# Patient Record
Sex: FEMALE | Race: WHITE | Employment: FULL TIME | URBAN - METROPOLITAN AREA
[De-identification: names, ages, dates, MRNs, and addresses within clinical notes are randomized per-mention and may not be internally consistent; named-entity substitution may affect disease eponyms.]

---

## 2017-01-22 ENCOUNTER — APPOINTMENT (OUTPATIENT)
Dept: CT IMAGING | Facility: HOSPITAL | Age: 32
End: 2017-01-22
Attending: EMERGENCY MEDICINE
Payer: COMMERCIAL

## 2017-01-22 ENCOUNTER — HOSPITAL ENCOUNTER (EMERGENCY)
Facility: HOSPITAL | Age: 32
Discharge: HOME | End: 2017-01-22
Attending: EMERGENCY MEDICINE
Payer: COMMERCIAL

## 2017-01-22 VITALS
HEIGHT: 68 IN | WEIGHT: 283 LBS | BODY MASS INDEX: 42.89 KG/M2 | SYSTOLIC BLOOD PRESSURE: 126 MMHG | HEART RATE: 80 BPM | OXYGEN SATURATION: 97 % | RESPIRATION RATE: 22 BRPM | TEMPERATURE: 98 F | DIASTOLIC BLOOD PRESSURE: 83 MMHG

## 2017-01-22 DIAGNOSIS — R10.9 ABDOMINAL PAIN OF UNKNOWN ETIOLOGY: ICD-10-CM

## 2017-01-22 DIAGNOSIS — N12 PYELONEPHRITIS: Primary | ICD-10-CM

## 2017-01-22 LAB
ALBUMIN SERPL-MCNC: 4.5 G/DL (ref 3.5–4.8)
ALP LIVER SERPL-CCNC: 41 U/L (ref 37–98)
ALT SERPL-CCNC: 34 U/L (ref 14–54)
AST SERPL-CCNC: 40 U/L (ref 15–41)
BASOPHILS # BLD AUTO: 0.08 X10(3) UL (ref 0–0.1)
BASOPHILS NFR BLD AUTO: 1 %
BILIRUB SERPL-MCNC: 0.5 MG/DL (ref 0.1–2)
BILIRUB UR QL STRIP.AUTO: NEGATIVE
BUN BLD-MCNC: 17 MG/DL (ref 8–20)
CALCIUM BLD-MCNC: 9 MG/DL (ref 8.3–10.3)
CHLORIDE: 103 MMOL/L (ref 101–111)
CO2: 27 MMOL/L (ref 22–32)
COLOR UR AUTO: YELLOW
CREAT BLD-MCNC: 1.1 MG/DL (ref 0.55–1.02)
EOSINOPHIL # BLD AUTO: 0.2 X10(3) UL (ref 0–0.3)
EOSINOPHIL NFR BLD AUTO: 2.4 %
ERYTHROCYTE [DISTWIDTH] IN BLOOD BY AUTOMATED COUNT: 17.5 % (ref 11.5–16)
GLUCOSE BLD-MCNC: 93 MG/DL (ref 70–99)
GLUCOSE UR STRIP.AUTO-MCNC: NEGATIVE MG/DL
HCT VFR BLD AUTO: 37.4 % (ref 34–50)
HGB BLD-MCNC: 12.2 G/DL (ref 12–16)
IMMATURE GRANULOCYTE COUNT: 0.05 X10(3) UL (ref 0–1)
IMMATURE GRANULOCYTE RATIO %: 0.6 %
KETONES UR STRIP.AUTO-MCNC: NEGATIVE MG/DL
LIPASE: 285 U/L (ref 73–393)
LYMPHOCYTES # BLD AUTO: 2.11 X10(3) UL (ref 0.9–4)
LYMPHOCYTES NFR BLD AUTO: 25.1 %
M PROTEIN MFR SERPL ELPH: 8.5 G/DL (ref 6.1–8.3)
MCH RBC QN AUTO: 26.7 PG (ref 27–33.2)
MCHC RBC AUTO-ENTMCNC: 32.6 G/DL (ref 31–37)
MCV RBC AUTO: 81.8 FL (ref 81–100)
MONOCYTES # BLD AUTO: 0.53 X10(3) UL (ref 0.1–0.6)
MONOCYTES NFR BLD AUTO: 6.3 %
NEUTROPHIL ABS PRELIM: 5.43 X10 (3) UL (ref 1.3–6.7)
NEUTROPHILS # BLD AUTO: 5.43 X10(3) UL (ref 1.3–6.7)
NEUTROPHILS NFR BLD AUTO: 64.6 %
NITRITE UR QL STRIP.AUTO: NEGATIVE
PH UR STRIP.AUTO: 6 [PH] (ref 4.5–8)
PLATELET # BLD AUTO: 349 10(3)UL (ref 150–450)
POCT LOT NUMBER: NORMAL
POCT URINE PREGNANCY: NEGATIVE
POTASSIUM SERPL-SCNC: 4.1 MMOL/L (ref 3.6–5.1)
PROT UR STRIP.AUTO-MCNC: NEGATIVE MG/DL
RBC # BLD AUTO: 4.57 X10(6)UL (ref 3.8–5.1)
RBC #/AREA URNS AUTO: >10 /HPF
RED CELL DISTRIBUTION WIDTH-SD: 51.1 FL (ref 35.1–46.3)
SODIUM SERPL-SCNC: 136 MMOL/L (ref 136–144)
SP GR UR STRIP.AUTO: 1.01 (ref 1–1.03)
TROPONIN: <0.046 NG/ML (ref ?–0.05)
UROBILINOGEN UR STRIP.AUTO-MCNC: <2 MG/DL
WBC # BLD AUTO: 8.4 X10(3) UL (ref 4–13)

## 2017-01-22 PROCEDURE — 84484 ASSAY OF TROPONIN QUANT: CPT | Performed by: EMERGENCY MEDICINE

## 2017-01-22 PROCEDURE — 83690 ASSAY OF LIPASE: CPT | Performed by: EMERGENCY MEDICINE

## 2017-01-22 PROCEDURE — 87086 URINE CULTURE/COLONY COUNT: CPT | Performed by: EMERGENCY MEDICINE

## 2017-01-22 PROCEDURE — 93005 ELECTROCARDIOGRAM TRACING: CPT

## 2017-01-22 PROCEDURE — 71275 CT ANGIOGRAPHY CHEST: CPT

## 2017-01-22 PROCEDURE — 80053 COMPREHEN METABOLIC PANEL: CPT | Performed by: EMERGENCY MEDICINE

## 2017-01-22 PROCEDURE — 99285 EMERGENCY DEPT VISIT HI MDM: CPT

## 2017-01-22 PROCEDURE — 96375 TX/PRO/DX INJ NEW DRUG ADDON: CPT

## 2017-01-22 PROCEDURE — 93010 ELECTROCARDIOGRAM REPORT: CPT

## 2017-01-22 PROCEDURE — 96365 THER/PROPH/DIAG IV INF INIT: CPT

## 2017-01-22 PROCEDURE — 81001 URINALYSIS AUTO W/SCOPE: CPT | Performed by: EMERGENCY MEDICINE

## 2017-01-22 PROCEDURE — 81025 URINE PREGNANCY TEST: CPT

## 2017-01-22 PROCEDURE — 74177 CT ABD & PELVIS W/CONTRAST: CPT

## 2017-01-22 PROCEDURE — 85025 COMPLETE CBC W/AUTO DIFF WBC: CPT | Performed by: EMERGENCY MEDICINE

## 2017-01-22 RX ORDER — ONDANSETRON 2 MG/ML
4 INJECTION INTRAMUSCULAR; INTRAVENOUS ONCE
Status: COMPLETED | OUTPATIENT
Start: 2017-01-22 | End: 2017-01-22

## 2017-01-22 RX ORDER — CEPHALEXIN 500 MG/1
500 CAPSULE ORAL 4 TIMES DAILY
Qty: 40 CAPSULE | Refills: 0 | Status: SHIPPED | OUTPATIENT
Start: 2017-01-22 | End: 2017-02-01

## 2017-01-22 RX ORDER — LISINOPRIL 10 MG/1
10 TABLET ORAL DAILY
COMMUNITY
End: 2020-07-07

## 2017-01-22 RX ORDER — IBUPROFEN 600 MG/1
600 TABLET ORAL ONCE
Status: COMPLETED | OUTPATIENT
Start: 2017-01-22 | End: 2017-01-22

## 2017-01-22 NOTE — ED PROVIDER NOTES
She feels comfortable going home. She would prefer to go home I discussed if she has any recurrent pain, fevers or chills to return here immediately.   Patient Seen in: [facility] Emergency Department    History   Patient presents with:  Abdomen/Fla hypothyroidism    • Mental disorder      hx of depression after ectopic pregnancy   • Depression    • Post partum depression      hx of after ectopic pregnancy, took meds for about 6 weeks   • Pregnancy-induced hypertension            Past Surgical History Pulse 01/22/17 1405 84   Resp 01/22/17 1405 18   Temp 01/22/17 1405 98 °F (36.7 °C)   Temp src 01/22/17 1405 Oral   SpO2 01/22/17 1405 98 %   O2 Device 01/22/17 1405 None (Room air)       Current:/83 mmHg  Pulse 80  Temp(Src) 98 °F (36.7 °C) (Oral) (*)     All other components within normal limits   LIPASE - Normal   TROPONIN I - Normal   CBC WITH DIFFERENTIAL WITH PLATELET    Narrative: The following orders were created for panel order CBC WITH DIFFERENTIAL WITH PLATELET.   Procedure right lateral abdomen, right flank area and also tender slightly in the right lower quadrant she may have a urinary tract infection with radiation to the right flank suggesting from the urinary tract infection.   She may have pyelonephritis, the patient fee

## 2017-01-23 LAB
ATRIAL RATE: 80 BPM
P AXIS: 28 DEGREES
P-R INTERVAL: 200 MS
Q-T INTERVAL: 398 MS
QRS DURATION: 110 MS
QTC CALCULATION (BEZET): 459 MS
R AXIS: -2 DEGREES
T AXIS: 59 DEGREES
VENTRICULAR RATE: 80 BPM

## 2017-03-11 ENCOUNTER — APPOINTMENT (OUTPATIENT)
Dept: GENERAL RADIOLOGY | Age: 32
End: 2017-03-11
Attending: EMERGENCY MEDICINE
Payer: COMMERCIAL

## 2017-03-11 ENCOUNTER — HOSPITAL ENCOUNTER (EMERGENCY)
Age: 32
Discharge: HOME | End: 2017-03-11
Attending: EMERGENCY MEDICINE
Payer: COMMERCIAL

## 2017-03-11 ENCOUNTER — APPOINTMENT (OUTPATIENT)
Dept: ULTRASOUND IMAGING | Age: 32
End: 2017-03-11
Attending: EMERGENCY MEDICINE
Payer: COMMERCIAL

## 2017-03-11 VITALS
BODY MASS INDEX: 40.92 KG/M2 | RESPIRATION RATE: 18 BRPM | WEIGHT: 270 LBS | TEMPERATURE: 98 F | HEART RATE: 66 BPM | HEIGHT: 68 IN | DIASTOLIC BLOOD PRESSURE: 72 MMHG | OXYGEN SATURATION: 97 % | SYSTOLIC BLOOD PRESSURE: 128 MMHG

## 2017-03-11 DIAGNOSIS — R79.89 ELEVATED SERUM CREATININE: ICD-10-CM

## 2017-03-11 DIAGNOSIS — R80.9 PROTEINURIA, UNSPECIFIED TYPE: ICD-10-CM

## 2017-03-11 DIAGNOSIS — D64.9 ANEMIA, UNSPECIFIED TYPE: Primary | ICD-10-CM

## 2017-03-11 DIAGNOSIS — R31.9 HEMATURIA: ICD-10-CM

## 2017-03-11 LAB
ALBUMIN SERPL-MCNC: 3.5 G/DL (ref 3.5–4.8)
ALP LIVER SERPL-CCNC: 34 U/L (ref 37–98)
ALT SERPL-CCNC: 19 U/L (ref 14–54)
AST SERPL-CCNC: 16 U/L (ref 15–41)
BASOPHILS # BLD AUTO: 0.02 X10(3) UL (ref 0–0.1)
BASOPHILS NFR BLD AUTO: 0.4 %
BILIRUB SERPL-MCNC: 0.5 MG/DL (ref 0.1–2)
BILIRUB UR QL STRIP.AUTO: NEGATIVE
BUN BLD-MCNC: 10 MG/DL (ref 8–20)
CALCIUM BLD-MCNC: 7.7 MG/DL (ref 8.3–10.3)
CHLORIDE: 108 MMOL/L (ref 101–111)
CLARITY UR REFRACT.AUTO: CLEAR
CO2: 28 MMOL/L (ref 22–32)
COLOR UR AUTO: YELLOW
CREAT BLD-MCNC: 1.05 MG/DL (ref 0.55–1.02)
DEPRECATED HBV CORE AB SER IA-ACNC: 29.5 NG/ML (ref 10–291)
EOSINOPHIL # BLD AUTO: 0.15 X10(3) UL (ref 0–0.3)
EOSINOPHIL NFR BLD AUTO: 2.8 %
ERYTHROCYTE [DISTWIDTH] IN BLOOD BY AUTOMATED COUNT: 15.5 % (ref 11.5–16)
GLUCOSE BLD-MCNC: 103 MG/DL (ref 70–99)
GLUCOSE UR STRIP.AUTO-MCNC: NEGATIVE MG/DL
HCT VFR BLD AUTO: 32.9 % (ref 34–50)
HGB BLD-MCNC: 10.5 G/DL (ref 12–16)
IMMATURE GRANULOCYTE COUNT: 0.04 X10(3) UL (ref 0–1)
IMMATURE GRANULOCYTE RATIO %: 0.7 %
IRON: 27 UG/DL (ref 28–170)
KETONES UR STRIP.AUTO-MCNC: NEGATIVE MG/DL
LIPASE: 131 U/L (ref 73–393)
LYMPHOCYTES # BLD AUTO: 1.3 X10(3) UL (ref 0.9–4)
LYMPHOCYTES NFR BLD AUTO: 24 %
M PROTEIN MFR SERPL ELPH: 7.4 G/DL (ref 6.1–8.3)
MCH RBC QN AUTO: 26.9 PG (ref 27–33.2)
MCHC RBC AUTO-ENTMCNC: 31.9 G/DL (ref 31–37)
MCV RBC AUTO: 84.1 FL (ref 81–100)
MONOCYTES # BLD AUTO: 0.57 X10(3) UL (ref 0.1–0.6)
MONOCYTES NFR BLD AUTO: 10.5 %
NEUTROPHIL ABS PRELIM: 3.34 X10 (3) UL (ref 1.3–6.7)
NEUTROPHILS # BLD AUTO: 3.34 X10(3) UL (ref 1.3–6.7)
NEUTROPHILS NFR BLD AUTO: 61.6 %
NITRITE UR QL STRIP.AUTO: NEGATIVE
PH UR STRIP.AUTO: 6 [PH] (ref 4.5–8)
PLATELET # BLD AUTO: 268 10(3)UL (ref 150–450)
POCT LOT NUMBER: NORMAL
POCT URINE PREGNANCY: NEGATIVE
POTASSIUM SERPL-SCNC: 3.5 MMOL/L (ref 3.6–5.1)
RBC # BLD AUTO: 3.91 X10(6)UL (ref 3.8–5.1)
RED CELL DISTRIBUTION WIDTH-SD: 47.2 FL (ref 35.1–46.3)
SODIUM SERPL-SCNC: 142 MMOL/L (ref 136–144)
SP GR UR STRIP.AUTO: 1.01 (ref 1–1.03)
UROBILINOGEN UR STRIP.AUTO-MCNC: 0.2 MG/DL
WBC # BLD AUTO: 5.4 X10(3) UL (ref 4–13)

## 2017-03-11 PROCEDURE — 96365 THER/PROPH/DIAG IV INF INIT: CPT

## 2017-03-11 PROCEDURE — 80053 COMPREHEN METABOLIC PANEL: CPT | Performed by: EMERGENCY MEDICINE

## 2017-03-11 PROCEDURE — 74020 XR ABDOMEN, OBSTRUCTIVE SERIES (CPT=74020): CPT

## 2017-03-11 PROCEDURE — 83690 ASSAY OF LIPASE: CPT | Performed by: EMERGENCY MEDICINE

## 2017-03-11 PROCEDURE — 87086 URINE CULTURE/COLONY COUNT: CPT | Performed by: EMERGENCY MEDICINE

## 2017-03-11 PROCEDURE — 81025 URINE PREGNANCY TEST: CPT

## 2017-03-11 PROCEDURE — 83540 ASSAY OF IRON: CPT | Performed by: EMERGENCY MEDICINE

## 2017-03-11 PROCEDURE — 99284 EMERGENCY DEPT VISIT MOD MDM: CPT

## 2017-03-11 PROCEDURE — 76700 US EXAM ABDOM COMPLETE: CPT

## 2017-03-11 PROCEDURE — 81001 URINALYSIS AUTO W/SCOPE: CPT | Performed by: EMERGENCY MEDICINE

## 2017-03-11 PROCEDURE — 96361 HYDRATE IV INFUSION ADD-ON: CPT

## 2017-03-11 PROCEDURE — 82728 ASSAY OF FERRITIN: CPT | Performed by: EMERGENCY MEDICINE

## 2017-03-11 PROCEDURE — 85025 COMPLETE CBC W/AUTO DIFF WBC: CPT | Performed by: EMERGENCY MEDICINE

## 2017-03-11 RX ORDER — ALBUTEROL SULFATE 2.5 MG/3ML
2.5 SOLUTION RESPIRATORY (INHALATION) EVERY 4 HOURS PRN
Qty: 30 AMPULE | Refills: 0 | Status: SHIPPED | OUTPATIENT
Start: 2017-03-11 | End: 2017-03-11

## 2017-03-11 NOTE — ED PROVIDER NOTES
Patient Seen in: THE [facility] Emergency Department In [place]    History   Patient presents with:  Abdomen/Flank Pain (GI/)    Stated Complaint: upper abdominal pain     HPI    Patient was seen in January with pyelonephritis.   Now she has had a few days o Alcohol Use: No                 Comment: once a month      Review of Systems    Positive for stated complaint: upper abdominal pain   Other systems are as noted in HPI. Constitutional and vital signs reviewed.       All other systems reviewed and negative following:     Glucose 103 (*)     Creatinine 1.05 (*)     Calcium, Total 7.7 (*)     Alkaline Phosphatase 34 (*)     Potassium 3.5 (*)     All other components within normal limits   CBC W/ DIFFERENTIAL - Abnormal; Notable for the following:     HGB 10.5 Impression:  Anemia, unspecified type  (primary encounter diagnosis)  Elevated serum creatinine  Hematuria  Proteinuria, unspecified type    Disposition:  Discharge    Follow-up:   Markos Villarreal [address] [place], [place] [id number]

## 2017-03-11 NOTE — ED INITIAL ASSESSMENT (HPI)
Was seen by another ED X2  for nausea/vomiting and flank pain. Currently on antibiotic-Macrobid. States pain is still there-smitha flank pain and both upper abd area. Still nauseous. taking zofran.

## 2017-03-13 ENCOUNTER — TELEPHONE (OUTPATIENT)
Dept: NEPHROLOGY | Facility: CLINIC | Age: 32
End: 2017-03-13

## 2017-03-22 ENCOUNTER — APPOINTMENT (OUTPATIENT)
Dept: LAB | Age: 32
End: 2017-03-22
Attending: INTERNAL MEDICINE
Payer: COMMERCIAL

## 2017-03-22 ENCOUNTER — OFFICE VISIT (OUTPATIENT)
Dept: NEPHROLOGY | Facility: CLINIC | Age: 32
End: 2017-03-22

## 2017-03-22 VITALS — SYSTOLIC BLOOD PRESSURE: 118 MMHG | WEIGHT: 278 LBS | BODY MASS INDEX: 42 KG/M2 | DIASTOLIC BLOOD PRESSURE: 80 MMHG

## 2017-03-22 DIAGNOSIS — N10 ACUTE PYELONEPHRITIS: Primary | ICD-10-CM

## 2017-03-22 DIAGNOSIS — N10 ACUTE PYELONEPHRITIS: ICD-10-CM

## 2017-03-22 LAB
BILIRUB UR QL STRIP.AUTO: NEGATIVE
CLARITY UR REFRACT.AUTO: CLEAR
COLOR UR AUTO: YELLOW
CREAT UR-SCNC: 157 MG/DL
GLUCOSE UR STRIP.AUTO-MCNC: NEGATIVE MG/DL
KETONES UR STRIP.AUTO-MCNC: NEGATIVE MG/DL
NITRITE UR QL STRIP.AUTO: NEGATIVE
PH UR STRIP.AUTO: 5 [PH] (ref 4.5–8)
PROT UR STRIP.AUTO-MCNC: NEGATIVE MG/DL
PROT UR-MCNC: 23.8 MG/DL
RBC #/AREA URNS AUTO: >10 /HPF
SP GR UR STRIP.AUTO: 1.02 (ref 1–1.03)
UROBILINOGEN UR STRIP.AUTO-MCNC: <2 MG/DL

## 2017-03-22 PROCEDURE — 84156 ASSAY OF PROTEIN URINE: CPT

## 2017-03-22 PROCEDURE — 82570 ASSAY OF URINE CREATININE: CPT

## 2017-03-22 PROCEDURE — 99243 OFF/OP CNSLTJ NEW/EST LOW 30: CPT | Performed by: INTERNAL MEDICINE

## 2017-03-22 PROCEDURE — 81001 URINALYSIS AUTO W/SCOPE: CPT

## 2017-03-22 NOTE — PROGRESS NOTES
Consult Note      REASON FOR CONSULT:  Recurrent UTI         HPI:   Marianela Krishnan is a 27year old female with Patient presents with:  Kidney Problem    PHYSICIAN DEE Espinoza was seen in the nephrology clinic today in consultation for man fever/chills  Denies wt loss/gain  Denies HA or visual changes  Denies CP or palpitations  Denies SOB/cough/hemoptysis  Denies abd or flank pain  Denies N/V/D  Denies change in urinary habits or gross hematuria  Denies LE edema  Denies skin rashes/myalgias MGGM            PHYSICAL EXAM:   /80 mmHg  Wt 278 lb   Wt Readings from Last 6 Encounters:  03/22/17 : 278 lb  03/11/17 : 270 lb  01/22/17 : 283 lb  07/17/16 : 280 lb  09/11/15 : 275 lb  02/16/15 : 270 lb 9.6 oz    General: Alert and oriented in no a Trace-lysed* 03/11/2017   PHURINE 6.0 03/11/2017   PROUR Trace* 03/11/2017   UROBILINOGEN 0.2 03/11/2017   NITRITE Negative 03/11/2017   LEUUR Trace* 03/11/2017   WBCUR 1-4 03/11/2017   RBCUR None Seen 03/11/2017   EPIUR Small 03/11/2017   BACUR None Seen

## 2018-10-02 ENCOUNTER — APPOINTMENT (OUTPATIENT)
Dept: CT IMAGING | Facility: HOSPITAL | Age: 33
End: 2018-10-02
Attending: EMERGENCY MEDICINE
Payer: COMMERCIAL

## 2018-10-02 ENCOUNTER — HOSPITAL ENCOUNTER (EMERGENCY)
Facility: HOSPITAL | Age: 33
Discharge: HOME | End: 2018-10-02
Attending: EMERGENCY MEDICINE
Payer: COMMERCIAL

## 2018-10-02 VITALS
HEIGHT: 69 IN | WEIGHT: 280 LBS | HEART RATE: 92 BPM | OXYGEN SATURATION: 98 % | BODY MASS INDEX: 41.47 KG/M2 | RESPIRATION RATE: 18 BRPM | DIASTOLIC BLOOD PRESSURE: 92 MMHG | SYSTOLIC BLOOD PRESSURE: 125 MMHG | TEMPERATURE: 98 F

## 2018-10-02 DIAGNOSIS — R11.11 NON-INTRACTABLE VOMITING WITHOUT NAUSEA, UNSPECIFIED VOMITING TYPE: ICD-10-CM

## 2018-10-02 DIAGNOSIS — R10.9 FLANK PAIN: Primary | ICD-10-CM

## 2018-10-02 PROCEDURE — 80053 COMPREHEN METABOLIC PANEL: CPT | Performed by: EMERGENCY MEDICINE

## 2018-10-02 PROCEDURE — 87086 URINE CULTURE/COLONY COUNT: CPT | Performed by: EMERGENCY MEDICINE

## 2018-10-02 PROCEDURE — 81001 URINALYSIS AUTO W/SCOPE: CPT | Performed by: EMERGENCY MEDICINE

## 2018-10-02 PROCEDURE — 99284 EMERGENCY DEPT VISIT MOD MDM: CPT

## 2018-10-02 PROCEDURE — 81025 URINE PREGNANCY TEST: CPT

## 2018-10-02 PROCEDURE — 74176 CT ABD & PELVIS W/O CONTRAST: CPT | Performed by: EMERGENCY MEDICINE

## 2018-10-02 PROCEDURE — 96361 HYDRATE IV INFUSION ADD-ON: CPT

## 2018-10-02 PROCEDURE — 83690 ASSAY OF LIPASE: CPT | Performed by: EMERGENCY MEDICINE

## 2018-10-02 PROCEDURE — 96375 TX/PRO/DX INJ NEW DRUG ADDON: CPT

## 2018-10-02 PROCEDURE — 96374 THER/PROPH/DIAG INJ IV PUSH: CPT

## 2018-10-02 PROCEDURE — 85025 COMPLETE CBC W/AUTO DIFF WBC: CPT | Performed by: EMERGENCY MEDICINE

## 2018-10-02 RX ORDER — ONDANSETRON 2 MG/ML
4 INJECTION INTRAMUSCULAR; INTRAVENOUS ONCE
Status: COMPLETED | OUTPATIENT
Start: 2018-10-02 | End: 2018-10-02

## 2018-10-02 RX ORDER — KETOROLAC TROMETHAMINE 30 MG/ML
30 INJECTION, SOLUTION INTRAMUSCULAR; INTRAVENOUS ONCE
Status: COMPLETED | OUTPATIENT
Start: 2018-10-02 | End: 2018-10-02

## 2018-10-02 RX ORDER — ONDANSETRON 4 MG/1
4 TABLET, ORALLY DISINTEGRATING ORAL EVERY 4 HOURS PRN
Qty: 10 TABLET | Refills: 0 | Status: SHIPPED | OUTPATIENT
Start: 2018-10-02 | End: 2018-10-09

## 2018-10-02 NOTE — ED PROVIDER NOTES
Patient Seen in: [facility] Emergency Department    History   Patient presents with:  Abdomen/Flank Pain (GI/)    Stated Complaint: back pain flank pain with nausea    HPI    Right upper and now left upper back pain and flank pain she states the l kg   SpO2 98%   BMI 41.35 kg/m²         Physical Exam   Constitutional: She is oriented to person, place, and time. She appears well-developed and well-nourished. No distress. HENT:   Head: Atraumatic.    Right Ear: External ear normal.   Left Ear: Extern A/G Ratio 0.8 (*)     All other components within normal limits   LIPASE - Normal   CBC WITH DIFFERENTIAL WITH PLATELET    Narrative: The following orders were created for panel order CBC WITH DIFFERENTIAL WITH PLATELET.   Procedure kidney is noted. ADRENALS:  No mass or enlargement. LIVER:  No enlargement, atrophy, abnormal density, or significant focal     lesion. BILIARY:  The gallbladder is decompressed.     PANCREAS:  No lesion, fluid collection, ductal dilatation, or gastroenteritis      Disposition and Plan     Clinical Impression:  Flank pain  (primary encounter diagnosis)  Non-intractable vomiting without nausea, unspecified vomiting type    Disposition:  Discharge  10/2/2018  6:09 pm    Follow-up:  Dawna Hilariokselene

## 2018-11-24 ENCOUNTER — HOSPITAL ENCOUNTER (EMERGENCY)
Age: 33
Discharge: HOME | End: 2018-11-24
Attending: EMERGENCY MEDICINE
Payer: COMMERCIAL

## 2018-11-24 VITALS
HEIGHT: 68 IN | WEIGHT: 275 LBS | TEMPERATURE: 98 F | BODY MASS INDEX: 41.68 KG/M2 | RESPIRATION RATE: 18 BRPM | HEART RATE: 81 BPM | DIASTOLIC BLOOD PRESSURE: 64 MMHG | OXYGEN SATURATION: 98 % | SYSTOLIC BLOOD PRESSURE: 119 MMHG

## 2018-11-24 DIAGNOSIS — K52.9 ACUTE GASTROENTERITIS: Primary | ICD-10-CM

## 2018-11-24 PROCEDURE — 81025 URINE PREGNANCY TEST: CPT

## 2018-11-24 PROCEDURE — 83690 ASSAY OF LIPASE: CPT | Performed by: EMERGENCY MEDICINE

## 2018-11-24 PROCEDURE — 96375 TX/PRO/DX INJ NEW DRUG ADDON: CPT

## 2018-11-24 PROCEDURE — 99284 EMERGENCY DEPT VISIT MOD MDM: CPT

## 2018-11-24 PROCEDURE — 81003 URINALYSIS AUTO W/O SCOPE: CPT | Performed by: EMERGENCY MEDICINE

## 2018-11-24 PROCEDURE — 96374 THER/PROPH/DIAG INJ IV PUSH: CPT

## 2018-11-24 PROCEDURE — 80053 COMPREHEN METABOLIC PANEL: CPT | Performed by: EMERGENCY MEDICINE

## 2018-11-24 PROCEDURE — 96361 HYDRATE IV INFUSION ADD-ON: CPT

## 2018-11-24 PROCEDURE — 85025 COMPLETE CBC W/AUTO DIFF WBC: CPT | Performed by: EMERGENCY MEDICINE

## 2018-11-24 RX ORDER — KETOROLAC TROMETHAMINE 30 MG/ML
15 INJECTION, SOLUTION INTRAMUSCULAR; INTRAVENOUS ONCE
Status: COMPLETED | OUTPATIENT
Start: 2018-11-24 | End: 2018-11-24

## 2018-11-24 RX ORDER — VENLAFAXINE HYDROCHLORIDE 75 MG/1
150 CAPSULE, EXTENDED RELEASE ORAL DAILY
COMMUNITY

## 2018-11-24 RX ORDER — ONDANSETRON 4 MG/1
4 TABLET, ORALLY DISINTEGRATING ORAL EVERY 4 HOURS PRN
Qty: 10 TABLET | Refills: 0 | Status: SHIPPED | OUTPATIENT
Start: 2018-11-24 | End: 2018-12-01

## 2018-11-24 RX ORDER — ONDANSETRON 2 MG/ML
4 INJECTION INTRAMUSCULAR; INTRAVENOUS ONCE
Status: COMPLETED | OUTPATIENT
Start: 2018-11-24 | End: 2018-11-24

## 2018-11-24 NOTE — ED INITIAL ASSESSMENT (HPI)
Pt reports left flank pain starting at 1200 today. Pt reports nausea/vomiting/diarrhea. Pt also c/o generalized abdominal pain.

## 2018-11-24 NOTE — ED PROVIDER NOTES
Patient Seen in: THE [facility] Emergency Department In [place]    History   Patient presents with:  Abdomen/Flank Pain (GI/)  Nausea/Vomiting/Diarrhea (gastrointestinal)  Back Pain (musculoskeletal)  Fever (infectious)    Stated Complaint: ABD/BACK PAIN NV SpO2 97 %   O2 Device None (Room air)       Current:/64   Pulse 81   Temp 98.2 °F (36.8 °C) (Oral)   Resp 18   Ht 172.7 cm (5' 8\")   Wt 124.7 kg   SpO2 98%   BMI 41.81 kg/m²         Physical Exam    Patient is alert and oriented x3 no acute distre ---------                               -----------         ------                     STOOL CULTURE(P)[[id number]]                                                            SHIGATOXIN[[id number]]

## 2020-01-01 ENCOUNTER — EXTERNAL RECORD (OUTPATIENT)
Dept: OTHER | Age: 35
End: 2020-01-01

## 2020-01-26 ENCOUNTER — HOSPITAL ENCOUNTER (EMERGENCY)
Age: 35
Discharge: HOME | End: 2020-01-26
Attending: EMERGENCY MEDICINE
Payer: COMMERCIAL

## 2020-01-26 VITALS
HEIGHT: 68.9 IN | OXYGEN SATURATION: 97 % | WEIGHT: 293 LBS | DIASTOLIC BLOOD PRESSURE: 78 MMHG | TEMPERATURE: 98 F | HEART RATE: 87 BPM | BODY MASS INDEX: 43.4 KG/M2 | RESPIRATION RATE: 16 BRPM | SYSTOLIC BLOOD PRESSURE: 150 MMHG

## 2020-01-26 DIAGNOSIS — R11.2 NAUSEA AND VOMITING IN ADULT: ICD-10-CM

## 2020-01-26 DIAGNOSIS — R19.7 DIARRHEA, UNSPECIFIED TYPE: ICD-10-CM

## 2020-01-26 DIAGNOSIS — R10.9 ABDOMINAL PAIN, ACUTE: Primary | ICD-10-CM

## 2020-01-26 LAB
ALBUMIN SERPL-MCNC: 3.6 G/DL (ref 3.4–5)
ALBUMIN/GLOB SERPL: 0.8 {RATIO} (ref 1–2)
ALP LIVER SERPL-CCNC: 41 U/L (ref 37–98)
ALT SERPL-CCNC: 68 U/L (ref 13–56)
ANION GAP SERPL CALC-SCNC: 8 MMOL/L (ref 0–18)
AST SERPL-CCNC: 57 U/L (ref 15–37)
BASOPHILS # BLD AUTO: 0.01 X10(3) UL (ref 0–0.2)
BASOPHILS NFR BLD AUTO: 0.1 %
BILIRUB SERPL-MCNC: 0.7 MG/DL (ref 0.1–2)
BILIRUB UR QL STRIP.AUTO: NEGATIVE
BUN BLD-MCNC: 12 MG/DL (ref 7–18)
BUN/CREAT SERPL: 12.4 (ref 10–20)
CALCIUM BLD-MCNC: 8.7 MG/DL (ref 8.5–10.1)
CHLORIDE SERPL-SCNC: 103 MMOL/L (ref 98–112)
CO2 SERPL-SCNC: 25 MMOL/L (ref 21–32)
COLOR UR AUTO: YELLOW
CREAT BLD-MCNC: 0.97 MG/DL (ref 0.55–1.02)
DEPRECATED RDW RBC AUTO: 43.9 FL (ref 35.1–46.3)
EOSINOPHIL # BLD AUTO: 0.19 X10(3) UL (ref 0–0.7)
EOSINOPHIL NFR BLD AUTO: 2.4 %
ERYTHROCYTE [DISTWIDTH] IN BLOOD BY AUTOMATED COUNT: 13.2 % (ref 11–15)
GLOBULIN PLAS-MCNC: 4.4 G/DL (ref 2.8–4.4)
GLUCOSE BLD-MCNC: 144 MG/DL (ref 70–99)
GLUCOSE UR STRIP.AUTO-MCNC: NEGATIVE MG/DL
HCT VFR BLD AUTO: 39 % (ref 35–48)
HGB BLD-MCNC: 12.7 G/DL (ref 12–16)
IMM GRANULOCYTES # BLD AUTO: 0.11 X10(3) UL (ref 0–1)
IMM GRANULOCYTES NFR BLD: 1.4 %
KETONES UR STRIP.AUTO-MCNC: NEGATIVE MG/DL
LIPASE SERPL-CCNC: 135 U/L (ref 73–393)
LYMPHOCYTES # BLD AUTO: 1.24 X10(3) UL (ref 1–4)
LYMPHOCYTES NFR BLD AUTO: 15.5 %
M PROTEIN MFR SERPL ELPH: 8 G/DL (ref 6.4–8.2)
MCH RBC QN AUTO: 29.3 PG (ref 26–34)
MCHC RBC AUTO-ENTMCNC: 32.6 G/DL (ref 31–37)
MCV RBC AUTO: 89.9 FL (ref 80–100)
MONOCYTES # BLD AUTO: 0.48 X10(3) UL (ref 0.1–1)
MONOCYTES NFR BLD AUTO: 6 %
NEUTROPHILS # BLD AUTO: 5.98 X10 (3) UL (ref 1.5–7.7)
NEUTROPHILS # BLD AUTO: 5.98 X10(3) UL (ref 1.5–7.7)
NEUTROPHILS NFR BLD AUTO: 74.6 %
NITRITE UR QL STRIP.AUTO: NEGATIVE
OSMOLALITY SERPL CALC.SUM OF ELEC: 284 MOSM/KG (ref 275–295)
PH UR STRIP.AUTO: 5 [PH] (ref 4.5–8)
PLATELET # BLD AUTO: 266 10(3)UL (ref 150–450)
POCT LOT NUMBER: NORMAL
POCT URINE PREGNANCY: NEGATIVE
POTASSIUM SERPL-SCNC: 4.3 MMOL/L (ref 3.5–5.1)
RBC # BLD AUTO: 4.34 X10(6)UL (ref 3.8–5.3)
RBC UR QL AUTO: NEGATIVE
SODIUM SERPL-SCNC: 136 MMOL/L (ref 136–145)
SP GR UR STRIP.AUTO: 1.02 (ref 1–1.03)
UROBILINOGEN UR STRIP.AUTO-MCNC: 0.2 MG/DL
WBC # BLD AUTO: 8 X10(3) UL (ref 4–11)

## 2020-01-26 PROCEDURE — 80053 COMPREHEN METABOLIC PANEL: CPT | Performed by: EMERGENCY MEDICINE

## 2020-01-26 PROCEDURE — 81001 URINALYSIS AUTO W/SCOPE: CPT | Performed by: EMERGENCY MEDICINE

## 2020-01-26 PROCEDURE — 99284 EMERGENCY DEPT VISIT MOD MDM: CPT

## 2020-01-26 PROCEDURE — 85025 COMPLETE CBC W/AUTO DIFF WBC: CPT | Performed by: EMERGENCY MEDICINE

## 2020-01-26 PROCEDURE — 96361 HYDRATE IV INFUSION ADD-ON: CPT

## 2020-01-26 PROCEDURE — 96372 THER/PROPH/DIAG INJ SC/IM: CPT

## 2020-01-26 PROCEDURE — 96374 THER/PROPH/DIAG INJ IV PUSH: CPT

## 2020-01-26 PROCEDURE — 81025 URINE PREGNANCY TEST: CPT

## 2020-01-26 PROCEDURE — 83690 ASSAY OF LIPASE: CPT | Performed by: EMERGENCY MEDICINE

## 2020-01-26 PROCEDURE — 96375 TX/PRO/DX INJ NEW DRUG ADDON: CPT

## 2020-01-26 RX ORDER — ONDANSETRON 2 MG/ML
4 INJECTION INTRAMUSCULAR; INTRAVENOUS ONCE
Status: COMPLETED | OUTPATIENT
Start: 2020-01-26 | End: 2020-01-26

## 2020-01-26 RX ORDER — ONDANSETRON 4 MG/1
4 TABLET, ORALLY DISINTEGRATING ORAL EVERY 4 HOURS PRN
Qty: 10 TABLET | Refills: 0 | Status: SHIPPED | OUTPATIENT
Start: 2020-01-26 | End: 2020-02-02

## 2020-01-26 RX ORDER — DICYCLOMINE HYDROCHLORIDE 10 MG/ML
20 INJECTION INTRAMUSCULAR ONCE
Status: COMPLETED | OUTPATIENT
Start: 2020-01-26 | End: 2020-01-26

## 2020-01-26 RX ORDER — FAMOTIDINE 20 MG/1
20 TABLET ORAL 2 TIMES DAILY
Qty: 28 TABLET | Refills: 0 | Status: SHIPPED | OUTPATIENT
Start: 2020-01-26 | End: 2020-02-09

## 2020-01-26 RX ORDER — DICYCLOMINE HCL 20 MG
20 TABLET ORAL 4 TIMES DAILY PRN
Qty: 30 TABLET | Refills: 0 | Status: SHIPPED | OUTPATIENT
Start: 2020-01-26 | End: 2020-02-25

## 2020-01-26 RX ORDER — KETOROLAC TROMETHAMINE 30 MG/ML
15 INJECTION, SOLUTION INTRAMUSCULAR; INTRAVENOUS ONCE
Status: COMPLETED | OUTPATIENT
Start: 2020-01-26 | End: 2020-01-26

## 2020-01-26 NOTE — ED INITIAL ASSESSMENT (HPI)
Pt presented to ER c/o upper abdominal pain that radiates toward her back for the past hour.  Pt states she vomited x4

## 2020-01-26 NOTE — ED PROVIDER NOTES
Patient Seen in: THE [facility] Emergency Department In [place]      History   Patient presents with:  Abdomen/Flank Pain    Stated Complaint: abdominal pain x 1 hour    HPI  39-year-old female presents ED with complaint of nausea vomiting diarrhea and abdomi Constitutional:       Appearance: She is well-developed. HENT:      Head: Normocephalic and atraumatic. Eyes:      Pupils: Pupils are equal, round, and reactive to light. Neck:      Musculoskeletal: Normal range of motion and neck supple.    Cardiov CBC W/ DIFFERENTIAL[[id number]]                              Final result                 Please view results for these tests on the individual orders.    CBC W/ DIFFERENTIAL                   MDM     This is a 28-year-old female who presents ED with

## 2020-07-05 ENCOUNTER — APPOINTMENT (OUTPATIENT)
Dept: ULTRASOUND IMAGING | Age: 35
End: 2020-07-05
Attending: EMERGENCY MEDICINE
Payer: COMMERCIAL

## 2020-07-05 ENCOUNTER — HOSPITAL ENCOUNTER (EMERGENCY)
Age: 35
Discharge: HOME | End: 2020-07-05
Attending: EMERGENCY MEDICINE
Payer: COMMERCIAL

## 2020-07-05 VITALS
RESPIRATION RATE: 18 BRPM | WEIGHT: 293 LBS | DIASTOLIC BLOOD PRESSURE: 88 MMHG | HEART RATE: 78 BPM | BODY MASS INDEX: 44.41 KG/M2 | OXYGEN SATURATION: 99 % | TEMPERATURE: 98 F | HEIGHT: 68 IN | SYSTOLIC BLOOD PRESSURE: 147 MMHG

## 2020-07-05 DIAGNOSIS — K80.20 CALCULUS OF GALLBLADDER WITHOUT CHOLECYSTITIS WITHOUT OBSTRUCTION: ICD-10-CM

## 2020-07-05 DIAGNOSIS — K80.50 BILIARY COLIC: Primary | ICD-10-CM

## 2020-07-05 LAB
ALBUMIN SERPL-MCNC: 3.6 G/DL (ref 3.4–5)
ALBUMIN/GLOB SERPL: 0.8 {RATIO} (ref 1–2)
ALP LIVER SERPL-CCNC: 42 U/L (ref 37–98)
ALT SERPL-CCNC: 80 U/L (ref 13–56)
ANION GAP SERPL CALC-SCNC: 5 MMOL/L (ref 0–18)
AST SERPL-CCNC: 57 U/L (ref 15–37)
BASOPHILS # BLD AUTO: 0.09 X10(3) UL (ref 0–0.2)
BASOPHILS NFR BLD AUTO: 0.9 %
BILIRUB SERPL-MCNC: 0.4 MG/DL (ref 0.1–2)
BILIRUB UR QL STRIP.AUTO: NEGATIVE
BUN BLD-MCNC: 10 MG/DL (ref 7–18)
BUN/CREAT SERPL: 11 (ref 10–20)
CALCIUM BLD-MCNC: 8.9 MG/DL (ref 8.5–10.1)
CHLORIDE SERPL-SCNC: 104 MMOL/L (ref 98–112)
CLARITY UR REFRACT.AUTO: CLEAR
CO2 SERPL-SCNC: 28 MMOL/L (ref 21–32)
COLOR UR AUTO: YELLOW
CREAT BLD-MCNC: 0.91 MG/DL (ref 0.55–1.02)
DEPRECATED RDW RBC AUTO: 44.9 FL (ref 35.1–46.3)
EOSINOPHIL # BLD AUTO: 0.26 X10(3) UL (ref 0–0.7)
EOSINOPHIL NFR BLD AUTO: 2.6 %
ERYTHROCYTE [DISTWIDTH] IN BLOOD BY AUTOMATED COUNT: 13.8 % (ref 11–15)
EXPIRATION DATE: YES
GLOBULIN PLAS-MCNC: 4.6 G/DL (ref 2.8–4.4)
GLUCOSE BLD-MCNC: 131 MG/DL (ref 70–99)
GLUCOSE UR STRIP.AUTO-MCNC: NEGATIVE MG/DL
HCT VFR BLD AUTO: 37.7 % (ref 35–48)
HGB BLD-MCNC: 12.4 G/DL (ref 12–16)
IMM GRANULOCYTES # BLD AUTO: 0.19 X10(3) UL (ref 0–1)
IMM GRANULOCYTES NFR BLD: 1.9 %
KETONES UR STRIP.AUTO-MCNC: NEGATIVE MG/DL
LEUKOCYTE ESTERASE UR QL STRIP.AUTO: NEGATIVE
LIPASE SERPL-CCNC: 186 U/L (ref 73–393)
LYMPHOCYTES # BLD AUTO: 2.41 X10(3) UL (ref 1–4)
LYMPHOCYTES NFR BLD AUTO: 24.1 %
M PROTEIN MFR SERPL ELPH: 8.2 G/DL (ref 6.4–8.2)
MCH RBC QN AUTO: 29.6 PG (ref 26–34)
MCHC RBC AUTO-ENTMCNC: 32.9 G/DL (ref 31–37)
MCV RBC AUTO: 90 FL (ref 80–100)
MONOCYTES # BLD AUTO: 0.49 X10(3) UL (ref 0.1–1)
MONOCYTES NFR BLD AUTO: 4.9 %
NEUTROPHILS # BLD AUTO: 6.56 X10 (3) UL (ref 1.5–7.7)
NEUTROPHILS # BLD AUTO: 6.56 X10(3) UL (ref 1.5–7.7)
NEUTROPHILS NFR BLD AUTO: 65.6 %
NITRITE UR QL STRIP.AUTO: NEGATIVE
OSMOLALITY SERPL CALC.SUM OF ELEC: 285 MOSM/KG (ref 275–295)
PH UR STRIP.AUTO: 7.5 [PH] (ref 4.5–8)
PLATELET # BLD AUTO: 283 10(3)UL (ref 150–450)
POCT URINE PREGNANCY: NEGATIVE
POTASSIUM SERPL-SCNC: 3.9 MMOL/L (ref 3.5–5.1)
PROCEDURE CONTROL: YES
PROT UR STRIP.AUTO-MCNC: NEGATIVE MG/DL
RBC # BLD AUTO: 4.19 X10(6)UL (ref 3.8–5.3)
SODIUM SERPL-SCNC: 137 MMOL/L (ref 136–145)
SP GR UR STRIP.AUTO: 1.02 (ref 1–1.03)
UROBILINOGEN UR STRIP.AUTO-MCNC: 0.2 MG/DL
WBC # BLD AUTO: 10 X10(3) UL (ref 4–11)

## 2020-07-05 PROCEDURE — 99284 EMERGENCY DEPT VISIT MOD MDM: CPT

## 2020-07-05 PROCEDURE — 81003 URINALYSIS AUTO W/O SCOPE: CPT | Performed by: EMERGENCY MEDICINE

## 2020-07-05 PROCEDURE — 83690 ASSAY OF LIPASE: CPT | Performed by: EMERGENCY MEDICINE

## 2020-07-05 PROCEDURE — 85025 COMPLETE CBC W/AUTO DIFF WBC: CPT | Performed by: EMERGENCY MEDICINE

## 2020-07-05 PROCEDURE — 96374 THER/PROPH/DIAG INJ IV PUSH: CPT

## 2020-07-05 PROCEDURE — 76700 US EXAM ABDOM COMPLETE: CPT | Performed by: EMERGENCY MEDICINE

## 2020-07-05 PROCEDURE — 81025 URINE PREGNANCY TEST: CPT

## 2020-07-05 PROCEDURE — 80053 COMPREHEN METABOLIC PANEL: CPT | Performed by: EMERGENCY MEDICINE

## 2020-07-05 PROCEDURE — 96375 TX/PRO/DX INJ NEW DRUG ADDON: CPT

## 2020-07-05 PROCEDURE — 96361 HYDRATE IV INFUSION ADD-ON: CPT

## 2020-07-05 RX ORDER — HYDROMORPHONE HYDROCHLORIDE 1 MG/ML
1 INJECTION, SOLUTION INTRAMUSCULAR; INTRAVENOUS; SUBCUTANEOUS EVERY 30 MIN PRN
Status: DISCONTINUED | OUTPATIENT
Start: 2020-07-05 | End: 2020-07-06

## 2020-07-05 RX ORDER — ONDANSETRON 2 MG/ML
4 INJECTION INTRAMUSCULAR; INTRAVENOUS ONCE
Status: COMPLETED | OUTPATIENT
Start: 2020-07-05 | End: 2020-07-05

## 2020-07-06 NOTE — ED PROVIDER NOTES
Patient Seen in: THE [facility] Emergency Department In [place]      History   Patient presents with:  Abdomen/Flank Pain    Stated Complaint: RUQ pain radiating to her back since this morning.  +Vomiting after eating Porti*    HPI    16-year-old female presen but soft. There is a positive Hoff sign. No masses or rebound. Skin is dry without rashes or lesions. Extremities are atraumatic. Neuro exam: Awake, conversive and moving all 4 extremities well.     ED Course     Labs Reviewed   COMP METABOLIC PANEL Cholelithiasis.               Disposition and Plan     Clinical Impression:  Biliary colic  (primary encounter diagnosis)  Calculus of gallbladder without cholecystitis without obstruction    Disposition:  Discharge  7/5/2020  9:53 pm    Follow-up:  Aidan Fitzgerald,

## 2020-07-06 NOTE — ED INITIAL ASSESSMENT (HPI)
Pt presents to ER complaining of epigastric pain radiating to her back and nausea since early today.

## 2020-07-07 RX ORDER — LEVOTHYROXINE SODIUM 0.15 MG/1
150 TABLET ORAL
COMMUNITY

## 2020-07-11 ENCOUNTER — LAB ENCOUNTER (OUTPATIENT)
Dept: LAB | Facility: HOSPITAL | Age: 35
End: 2020-07-11
Attending: SURGERY
Payer: COMMERCIAL

## 2020-07-11 DIAGNOSIS — K80.20 CALCULUS OF GALLBLADDER: ICD-10-CM

## 2020-07-11 LAB — SARS-COV-2 RNA RESP QL NAA+PROBE: NOT DETECTED

## 2020-07-14 ENCOUNTER — ANESTHESIA (OUTPATIENT)
Dept: SURGERY | Facility: HOSPITAL | Age: 35
End: 2020-07-14
Payer: COMMERCIAL

## 2020-07-14 ENCOUNTER — ANESTHESIA EVENT (OUTPATIENT)
Dept: SURGERY | Facility: HOSPITAL | Age: 35
End: 2020-07-14
Payer: COMMERCIAL

## 2020-07-14 ENCOUNTER — HOSPITAL ENCOUNTER (OUTPATIENT)
Facility: HOSPITAL | Age: 35
Setting detail: HOSPITAL OUTPATIENT SURGERY
Discharge: HOME | End: 2020-07-14
Attending: SURGERY | Admitting: SURGERY
Payer: COMMERCIAL

## 2020-07-14 VITALS
BODY MASS INDEX: 44.41 KG/M2 | TEMPERATURE: 98 F | DIASTOLIC BLOOD PRESSURE: 97 MMHG | HEART RATE: 82 BPM | OXYGEN SATURATION: 93 % | RESPIRATION RATE: 20 BRPM | WEIGHT: 293 LBS | HEIGHT: 68 IN | SYSTOLIC BLOOD PRESSURE: 154 MMHG

## 2020-07-14 DIAGNOSIS — K80.20 CALCULUS OF GALLBLADDER: Primary | ICD-10-CM

## 2020-07-14 DIAGNOSIS — K80.10 CALCULUS OF GALLBLADDER WITH CHRONIC CHOLECYSTITIS WITHOUT OBSTRUCTION: ICD-10-CM

## 2020-07-14 LAB
POCT LOT NUMBER: NORMAL
POCT URINE PREGNANCY: NEGATIVE

## 2020-07-14 PROCEDURE — 36410 VNPNXR 3YR/> PHY/QHP DX/THER: CPT | Performed by: SURGERY

## 2020-07-14 PROCEDURE — 81025 URINE PREGNANCY TEST: CPT | Performed by: SURGERY

## 2020-07-14 PROCEDURE — 76937 US GUIDE VASCULAR ACCESS: CPT | Performed by: SURGERY

## 2020-07-14 PROCEDURE — 0FT44ZZ RESECTION OF GALLBLADDER, PERCUTANEOUS ENDOSCOPIC APPROACH: ICD-10-PCS | Performed by: SURGERY

## 2020-07-14 PROCEDURE — 88304 TISSUE EXAM BY PATHOLOGIST: CPT | Performed by: SURGERY

## 2020-07-14 RX ORDER — ONDANSETRON 2 MG/ML
INJECTION INTRAMUSCULAR; INTRAVENOUS AS NEEDED
Status: DISCONTINUED | OUTPATIENT
Start: 2020-07-14 | End: 2020-07-14 | Stop reason: SURG

## 2020-07-14 RX ORDER — KETOROLAC TROMETHAMINE 30 MG/ML
30 INJECTION, SOLUTION INTRAMUSCULAR; INTRAVENOUS ONCE
Status: COMPLETED | OUTPATIENT
Start: 2020-07-14 | End: 2020-07-14

## 2020-07-14 RX ORDER — ONDANSETRON 2 MG/ML
4 INJECTION INTRAMUSCULAR; INTRAVENOUS AS NEEDED
Status: DISCONTINUED | OUTPATIENT
Start: 2020-07-14 | End: 2020-07-14

## 2020-07-14 RX ORDER — DEXAMETHASONE SODIUM PHOSPHATE 4 MG/ML
VIAL (ML) INJECTION AS NEEDED
Status: DISCONTINUED | OUTPATIENT
Start: 2020-07-14 | End: 2020-07-14 | Stop reason: SURG

## 2020-07-14 RX ORDER — SODIUM CHLORIDE, SODIUM LACTATE, POTASSIUM CHLORIDE, CALCIUM CHLORIDE 600; 310; 30; 20 MG/100ML; MG/100ML; MG/100ML; MG/100ML
INJECTION, SOLUTION INTRAVENOUS CONTINUOUS
Status: DISCONTINUED | OUTPATIENT
Start: 2020-07-14 | End: 2020-07-14

## 2020-07-14 RX ORDER — ROCURONIUM BROMIDE 10 MG/ML
INJECTION, SOLUTION INTRAVENOUS AS NEEDED
Status: DISCONTINUED | OUTPATIENT
Start: 2020-07-14 | End: 2020-07-14 | Stop reason: SURG

## 2020-07-14 RX ORDER — HYDROMORPHONE HYDROCHLORIDE 1 MG/ML
0.4 INJECTION, SOLUTION INTRAMUSCULAR; INTRAVENOUS; SUBCUTANEOUS EVERY 5 MIN PRN
Status: DISCONTINUED | OUTPATIENT
Start: 2020-07-14 | End: 2020-07-14

## 2020-07-14 RX ORDER — MIDAZOLAM HYDROCHLORIDE 1 MG/ML
1 INJECTION INTRAMUSCULAR; INTRAVENOUS EVERY 5 MIN PRN
Status: DISCONTINUED | OUTPATIENT
Start: 2020-07-14 | End: 2020-07-14

## 2020-07-14 RX ORDER — KETAMINE HYDROCHLORIDE 50 MG/ML
INJECTION, SOLUTION, CONCENTRATE INTRAMUSCULAR; INTRAVENOUS AS NEEDED
Status: DISCONTINUED | OUTPATIENT
Start: 2020-07-14 | End: 2020-07-14 | Stop reason: SURG

## 2020-07-14 RX ORDER — ACETAMINOPHEN 500 MG
1000 TABLET ORAL ONCE
Status: DISCONTINUED | OUTPATIENT
Start: 2020-07-14 | End: 2020-07-14 | Stop reason: HOSPADM

## 2020-07-14 RX ORDER — NALOXONE HYDROCHLORIDE 0.4 MG/ML
80 INJECTION, SOLUTION INTRAMUSCULAR; INTRAVENOUS; SUBCUTANEOUS AS NEEDED
Status: DISCONTINUED | OUTPATIENT
Start: 2020-07-14 | End: 2020-07-14

## 2020-07-14 RX ORDER — IBUPROFEN 800 MG/1
800 TABLET ORAL EVERY 8 HOURS PRN
Qty: 20 TABLET | Refills: 1 | Status: SHIPPED | OUTPATIENT
Start: 2020-07-14 | End: 2020-09-12

## 2020-07-14 RX ORDER — MEPERIDINE HYDROCHLORIDE 25 MG/ML
12.5 INJECTION INTRAMUSCULAR; INTRAVENOUS; SUBCUTANEOUS AS NEEDED
Status: DISCONTINUED | OUTPATIENT
Start: 2020-07-14 | End: 2020-07-14

## 2020-07-14 RX ORDER — BUPIVACAINE HYDROCHLORIDE 5 MG/ML
INJECTION, SOLUTION EPIDURAL; INTRACAUDAL AS NEEDED
Status: DISCONTINUED | OUTPATIENT
Start: 2020-07-14 | End: 2020-07-14 | Stop reason: HOSPADM

## 2020-07-14 RX ORDER — KETOROLAC TROMETHAMINE 30 MG/ML
INJECTION, SOLUTION INTRAMUSCULAR; INTRAVENOUS
Status: COMPLETED
Start: 2020-07-14 | End: 2020-07-14

## 2020-07-14 RX ORDER — HYDROMORPHONE HYDROCHLORIDE 1 MG/ML
INJECTION, SOLUTION INTRAMUSCULAR; INTRAVENOUS; SUBCUTANEOUS
Status: COMPLETED
Start: 2020-07-14 | End: 2020-07-14

## 2020-07-14 RX ORDER — METOCLOPRAMIDE HYDROCHLORIDE 5 MG/ML
INJECTION INTRAMUSCULAR; INTRAVENOUS AS NEEDED
Status: DISCONTINUED | OUTPATIENT
Start: 2020-07-14 | End: 2020-07-14 | Stop reason: SURG

## 2020-07-14 RX ORDER — HYDROCODONE BITARTRATE AND ACETAMINOPHEN 5; 325 MG/1; MG/1
2 TABLET ORAL AS NEEDED
Status: COMPLETED | OUTPATIENT
Start: 2020-07-14 | End: 2020-07-14

## 2020-07-14 RX ORDER — HYDROCODONE BITARTRATE AND ACETAMINOPHEN 5; 325 MG/1; MG/1
1 TABLET ORAL EVERY 4 HOURS PRN
Qty: 20 TABLET | Refills: 0 | Status: SHIPPED | OUTPATIENT
Start: 2020-07-14 | End: 2020-07-20

## 2020-07-14 RX ORDER — ACETAMINOPHEN 500 MG
1000 TABLET ORAL ONCE
COMMUNITY
End: 2020-09-26

## 2020-07-14 RX ORDER — SCOLOPAMINE TRANSDERMAL SYSTEM 1 MG/1
1 PATCH, EXTENDED RELEASE TRANSDERMAL ONCE
Status: DISCONTINUED | OUTPATIENT
Start: 2020-07-14 | End: 2020-07-14

## 2020-07-14 RX ORDER — HYDROCODONE BITARTRATE AND ACETAMINOPHEN 5; 325 MG/1; MG/1
1 TABLET ORAL AS NEEDED
Status: COMPLETED | OUTPATIENT
Start: 2020-07-14 | End: 2020-07-14

## 2020-07-14 RX ADMIN — ONDANSETRON 4 MG: 2 INJECTION INTRAMUSCULAR; INTRAVENOUS at 17:05:00

## 2020-07-14 RX ADMIN — METOCLOPRAMIDE HYDROCHLORIDE 10 MG: 5 INJECTION INTRAMUSCULAR; INTRAVENOUS at 17:05:00

## 2020-07-14 RX ADMIN — ROCURONIUM BROMIDE 40 MG: 10 INJECTION, SOLUTION INTRAVENOUS at 16:46:00

## 2020-07-14 RX ADMIN — ROCURONIUM BROMIDE 5 MG: 10 INJECTION, SOLUTION INTRAVENOUS at 16:40:00

## 2020-07-14 RX ADMIN — KETAMINE HYDROCHLORIDE 50 MG: 50 INJECTION, SOLUTION, CONCENTRATE INTRAMUSCULAR; INTRAVENOUS at 16:40:00

## 2020-07-14 RX ADMIN — DEXAMETHASONE SODIUM PHOSPHATE 8 MG: 4 MG/ML VIAL (ML) INJECTION at 17:05:00

## 2020-07-14 NOTE — BRIEF OP NOTE
Pre-Operative Diagnosis: Calculus of gallbladder with chronic cholecystitis without obstruction [K80.10]     Post-Operative Diagnosis: Calculus of gallbladder with chronic cholecystitis without obstruction [K80.10]      Procedure Performed:   LAPAROSCOPIC

## 2020-07-14 NOTE — H&P
The above referenced H&P in the office on 7/6/2020 by Dr. Lola Lara was reviewed by Apoorva Nolasco MD on 7/14/2020, the patient was examined and no significant changes have occurred in the patient's condition since the H&P was performed.   Risks and benefits were

## 2020-07-14 NOTE — ANESTHESIA PREPROCEDURE EVALUATION
PRE-OP EVALUATION    Patient Name: Carley Race    Pre-op Diagnosis: Calculus of gallbladder with chronic cholecystitis without obstruction [K80.10]    Procedure(s):  LAPAROSCOPIC CHOLECYSTECTOMY, POSSIBLE CHOLANGIOGRAM, POSSIBLE OPEN    Surgeon(s) and Pulmonary                    (+) sleep apnea and CPAP      Neuro/Psych    Negative neuro/psych ROS.                                 Past Surgical History:   Procedure Laterality Date   •       x2   • LAPAROSCOPIC SALPINGOSTOMY      right-ecto

## 2020-07-14 NOTE — ANESTHESIA POSTPROCEDURE EVALUATION
[address] [facility],[address] Patient Status:  Hospital Outpatient Surgery   Age/Gender 38year old female MRN [id number]   Location [address] [facility] Attending Marcie Krabbe, MD   Hosp Day # 0 PCP PHYSICIAN NONSTAFF       Ane

## 2020-07-14 NOTE — ANESTHESIA PROCEDURE NOTES
Airway  Urgency: elective    Difficult airway    General Information and Staff    Patient location during procedure: OR  Anesthesiologist: Mone Worley MD  Performed: anesthesiologist     Indications and Patient Condition  Indications for airway manageme

## 2020-07-15 NOTE — OPERATIVE REPORT
[facility]    PATIENT'S NAME: Marcia Bolton   ATTENDING PHYSICIAN: Molly Garces M.D. OPERATING PHYSICIAN: Molly Garces M.D.    PATIENT ACCOUNT#:   [de-identified]    LOCATION:  PREOPASCC  PRE ASCC 1 EDWP 10  MEDICAL RECORD #:   [id number]       DATE Brien Cortez abdomen was elevated, Veress needle was inserted. The abdomen was insufflated with CO2, a 10 mm Visiport trocar was inserted under direct guidance of the camera.   Then, after injecting the local anesthetic in the subxiphoid, trocar was placed under direct

## 2020-09-26 ENCOUNTER — HOSPITAL ENCOUNTER (EMERGENCY)
Facility: HOSPITAL | Age: 35
Discharge: HOME | End: 2020-09-27
Attending: EMERGENCY MEDICINE
Payer: COMMERCIAL

## 2020-09-26 ENCOUNTER — APPOINTMENT (OUTPATIENT)
Dept: CT IMAGING | Facility: HOSPITAL | Age: 35
End: 2020-09-26
Attending: EMERGENCY MEDICINE
Payer: COMMERCIAL

## 2020-09-26 VITALS
BODY MASS INDEX: 43.4 KG/M2 | DIASTOLIC BLOOD PRESSURE: 81 MMHG | TEMPERATURE: 98 F | WEIGHT: 293 LBS | HEART RATE: 82 BPM | HEIGHT: 69 IN | RESPIRATION RATE: 26 BRPM | OXYGEN SATURATION: 99 % | SYSTOLIC BLOOD PRESSURE: 131 MMHG

## 2020-09-26 DIAGNOSIS — R79.89 ELEVATED LFTS: ICD-10-CM

## 2020-09-26 DIAGNOSIS — R06.00 DOE (DYSPNEA ON EXERTION): Primary | ICD-10-CM

## 2020-09-26 DIAGNOSIS — N30.00 ACUTE CYSTITIS WITHOUT HEMATURIA: ICD-10-CM

## 2020-09-26 LAB
ALBUMIN SERPL-MCNC: 3.9 G/DL (ref 3.4–5)
ALBUMIN/GLOB SERPL: 1 {RATIO} (ref 1–2)
ALP LIVER SERPL-CCNC: 47 U/L
ALT SERPL-CCNC: 128 U/L
ANION GAP SERPL CALC-SCNC: 6 MMOL/L (ref 0–18)
AST SERPL-CCNC: 156 U/L (ref 15–37)
BASOPHILS # BLD AUTO: 0.1 X10(3) UL (ref 0–0.2)
BASOPHILS NFR BLD AUTO: 0.9 %
BILIRUB SERPL-MCNC: 0.4 MG/DL (ref 0.1–2)
BILIRUB UR QL STRIP.AUTO: NEGATIVE
BUN BLD-MCNC: 12 MG/DL (ref 7–18)
BUN/CREAT SERPL: 10.1 (ref 10–20)
CALCIUM BLD-MCNC: 9.3 MG/DL (ref 8.5–10.1)
CHLORIDE SERPL-SCNC: 102 MMOL/L (ref 98–112)
CO2 SERPL-SCNC: 29 MMOL/L (ref 21–32)
COLOR UR AUTO: YELLOW
CREAT BLD-MCNC: 1.19 MG/DL
DEPRECATED RDW RBC AUTO: 45.9 FL (ref 35.1–46.3)
EOSINOPHIL # BLD AUTO: 0.63 X10(3) UL (ref 0–0.7)
EOSINOPHIL NFR BLD AUTO: 5.8 %
ERYTHROCYTE [DISTWIDTH] IN BLOOD BY AUTOMATED COUNT: 13.6 % (ref 11–15)
GLOBULIN PLAS-MCNC: 3.9 G/DL (ref 2.8–4.4)
GLUCOSE BLD-MCNC: 118 MG/DL (ref 70–99)
GLUCOSE UR STRIP.AUTO-MCNC: NEGATIVE MG/DL
HCT VFR BLD AUTO: 39.2 %
HGB BLD-MCNC: 12.8 G/DL
IMM GRANULOCYTES # BLD AUTO: 0.17 X10(3) UL (ref 0–1)
IMM GRANULOCYTES NFR BLD: 1.6 %
KETONES UR STRIP.AUTO-MCNC: NEGATIVE MG/DL
LYMPHOCYTES # BLD AUTO: 2.6 X10(3) UL (ref 1–4)
LYMPHOCYTES NFR BLD AUTO: 23.9 %
M PROTEIN MFR SERPL ELPH: 7.8 G/DL (ref 6.4–8.2)
MCH RBC QN AUTO: 29.8 PG (ref 26–34)
MCHC RBC AUTO-ENTMCNC: 32.7 G/DL (ref 31–37)
MCV RBC AUTO: 91.4 FL
MONOCYTES # BLD AUTO: 0.48 X10(3) UL (ref 0.1–1)
MONOCYTES NFR BLD AUTO: 4.4 %
NEUTROPHILS # BLD AUTO: 6.88 X10 (3) UL (ref 1.5–7.7)
NEUTROPHILS # BLD AUTO: 6.88 X10(3) UL (ref 1.5–7.7)
NEUTROPHILS NFR BLD AUTO: 63.4 %
NITRITE UR QL STRIP.AUTO: NEGATIVE
NT-PROBNP SERPL-MCNC: 17 PG/ML (ref ?–125)
OSMOLALITY SERPL CALC.SUM OF ELEC: 285 MOSM/KG (ref 275–295)
PH UR STRIP.AUTO: 6 [PH] (ref 4.5–8)
PLATELET # BLD AUTO: 308 10(3)UL (ref 150–450)
POCT URINE PREGNANCY: NEGATIVE
POTASSIUM SERPL-SCNC: 3.8 MMOL/L (ref 3.5–5.1)
PROT UR STRIP.AUTO-MCNC: NEGATIVE MG/DL
RBC # BLD AUTO: 4.29 X10(6)UL
RBC #/AREA URNS AUTO: >10 /HPF
SODIUM SERPL-SCNC: 137 MMOL/L (ref 136–145)
SP GR UR STRIP.AUTO: 1.01 (ref 1–1.03)
TROPONIN I SERPL-MCNC: <0.045 NG/ML (ref ?–0.04)
UROBILINOGEN UR STRIP.AUTO-MCNC: <2 MG/DL
WBC # BLD AUTO: 10.9 X10(3) UL (ref 4–11)

## 2020-09-26 PROCEDURE — 80053 COMPREHEN METABOLIC PANEL: CPT | Performed by: EMERGENCY MEDICINE

## 2020-09-26 PROCEDURE — 81025 URINE PREGNANCY TEST: CPT

## 2020-09-26 PROCEDURE — 96360 HYDRATION IV INFUSION INIT: CPT

## 2020-09-26 PROCEDURE — 71275 CT ANGIOGRAPHY CHEST: CPT | Performed by: EMERGENCY MEDICINE

## 2020-09-26 PROCEDURE — 87077 CULTURE AEROBIC IDENTIFY: CPT | Performed by: EMERGENCY MEDICINE

## 2020-09-26 PROCEDURE — 84484 ASSAY OF TROPONIN QUANT: CPT | Performed by: EMERGENCY MEDICINE

## 2020-09-26 PROCEDURE — 93005 ELECTROCARDIOGRAM TRACING: CPT

## 2020-09-26 PROCEDURE — 96361 HYDRATE IV INFUSION ADD-ON: CPT

## 2020-09-26 PROCEDURE — 93010 ELECTROCARDIOGRAM REPORT: CPT

## 2020-09-26 PROCEDURE — 87086 URINE CULTURE/COLONY COUNT: CPT | Performed by: EMERGENCY MEDICINE

## 2020-09-26 PROCEDURE — 81001 URINALYSIS AUTO W/SCOPE: CPT | Performed by: EMERGENCY MEDICINE

## 2020-09-26 PROCEDURE — 99285 EMERGENCY DEPT VISIT HI MDM: CPT

## 2020-09-26 PROCEDURE — 85025 COMPLETE CBC W/AUTO DIFF WBC: CPT | Performed by: EMERGENCY MEDICINE

## 2020-09-26 PROCEDURE — 83880 ASSAY OF NATRIURETIC PEPTIDE: CPT | Performed by: EMERGENCY MEDICINE

## 2020-09-26 RX ORDER — LISINOPRIL 20 MG/1
20 TABLET ORAL DAILY
COMMUNITY

## 2020-09-27 LAB
ATRIAL RATE: 84 BPM
P AXIS: 34 DEGREES
P-R INTERVAL: 210 MS
Q-T INTERVAL: 406 MS
QRS DURATION: 110 MS
QTC CALCULATION (BEZET): 479 MS
R AXIS: 22 DEGREES
T AXIS: 15 DEGREES
VENTRICULAR RATE: 84 BPM

## 2020-09-27 RX ORDER — SULFAMETHOXAZOLE AND TRIMETHOPRIM 800; 160 MG/1; MG/1
1 TABLET ORAL ONCE
Status: COMPLETED | OUTPATIENT
Start: 2020-09-27 | End: 2020-09-27

## 2020-09-27 RX ORDER — SULFAMETHOXAZOLE AND TRIMETHOPRIM 800; 160 MG/1; MG/1
1 TABLET ORAL 2 TIMES DAILY
Qty: 6 TABLET | Refills: 0 | Status: SHIPPED | OUTPATIENT
Start: 2020-09-27 | End: 2020-09-30

## 2020-09-27 NOTE — ED PROVIDER NOTES
Patient Seen in: [facility] Emergency Department      History   Patient presents with:  Difficulty Breathing    Stated Complaint: ROBIN    HPI    25-year-old female presents to the emergency department complaining of a 4-week history of increased shor • LAPAROSCOPIC SALPINGOSTOMY  2013    right-ectopic   • OTHER SURGICAL HISTORY  2005    thyroid removed   • REMOVAL GALLBLADDER     • THYROIDECTOMY                      Social History    Tobacco Use      Smoking status: Never Smoker      Smokeless tobacc  (*)      (*)     All other components within normal limits   URINALYSIS WITH CULTURE REFLEX - Abnormal; Notable for the following components:    Clarity Urine Hazy (*)     Blood Urine Moderate (*)     Leukocyte Esterase Urine Large (*)     W Specialist consultation. MDM      #1. Dyspnea on exertion of uncertain etiology. Requires cardiac work-up. 2.  Elevated LFTs. Rule out fatty liver. 3.  Morbid obesity.               Disposition and Plan     Clinical Impression:  CR (dyspn

## 2020-09-27 NOTE — ED INITIAL ASSESSMENT (HPI)
Here for exertional dyspnea, with notable generalized body tingling. Felt swelling of extremities. Recently dx with HTN, increased meds recently.

## 2020-09-28 ENCOUNTER — HOSPITAL ENCOUNTER (OUTPATIENT)
Dept: CV DIAGNOSTICS | Facility: HOSPITAL | Age: 35
Discharge: HOME | End: 2020-09-28
Attending: EMERGENCY MEDICINE
Payer: COMMERCIAL

## 2020-09-28 ENCOUNTER — TELEPHONE (OUTPATIENT)
Dept: CARDIOLOGY | Age: 35
End: 2020-09-28

## 2020-09-28 ENCOUNTER — V-VISIT (OUTPATIENT)
Dept: CARDIOLOGY | Age: 35
End: 2020-09-28

## 2020-09-28 VITALS
DIASTOLIC BLOOD PRESSURE: 90 MMHG | WEIGHT: 293 LBS | BODY MASS INDEX: 43.4 KG/M2 | HEIGHT: 69 IN | SYSTOLIC BLOOD PRESSURE: 140 MMHG

## 2020-09-28 DIAGNOSIS — E78.00 HYPERCHOLESTEREMIA: ICD-10-CM

## 2020-09-28 DIAGNOSIS — R20.2 PARESTHESIA: ICD-10-CM

## 2020-09-28 DIAGNOSIS — R06.00 DOE (DYSPNEA ON EXERTION): ICD-10-CM

## 2020-09-28 DIAGNOSIS — R94.31 ABNORMAL ELECTROCARDIOGRAM (ECG) (EKG): ICD-10-CM

## 2020-09-28 DIAGNOSIS — R06.02 SHORTNESS OF BREATH: ICD-10-CM

## 2020-09-28 DIAGNOSIS — I10 ESSENTIAL HYPERTENSION: Primary | ICD-10-CM

## 2020-09-28 PROCEDURE — 93306 TTE W/DOPPLER COMPLETE: CPT | Performed by: EMERGENCY MEDICINE

## 2020-09-28 PROCEDURE — 99442 TELEPHONE E&M BY PHYSICIAN EST PT NOT ORIG PREV 7 DAYS 11-20 MIN: CPT | Performed by: INTERNAL MEDICINE

## 2020-09-28 RX ORDER — LISINOPRIL 20 MG/1
20 TABLET ORAL DAILY
COMMUNITY
Start: 2020-09-24 | End: 2020-09-28 | Stop reason: DRUGHIGH

## 2020-09-28 RX ORDER — SULFAMETHOXAZOLE AND TRIMETHOPRIM 800; 160 MG/1; MG/1
1 TABLET ORAL 2 TIMES DAILY
COMMUNITY
Start: 2020-09-27

## 2020-09-28 RX ORDER — VENLAFAXINE 75 MG/1
75 TABLET ORAL DAILY
COMMUNITY

## 2020-09-28 RX ORDER — BUMETANIDE 1 MG/1
1 TABLET ORAL DAILY
COMMUNITY

## 2020-09-28 RX ORDER — IBUPROFEN 800 MG/1
TABLET ORAL
COMMUNITY
Start: 2020-07-23 | End: 2020-09-28

## 2020-09-28 RX ORDER — HYDROCODONE BITARTRATE AND ACETAMINOPHEN 5; 325 MG/1; MG/1
TABLET ORAL
COMMUNITY
Start: 2020-07-14 | End: 2020-09-28

## 2020-09-28 RX ORDER — HYDROCHLOROTHIAZIDE 25 MG/1
25 TABLET ORAL DAILY
COMMUNITY
Start: 2020-09-08 | End: 2020-09-28

## 2020-09-28 RX ORDER — LISINOPRIL 30 MG/1
30 TABLET ORAL DAILY
Qty: 90 TABLET | Refills: 0 | Status: SHIPPED | OUTPATIENT
Start: 2020-09-28 | End: 2020-12-30

## 2020-09-28 RX ORDER — BUMETANIDE 1 MG/1
TABLET ORAL
COMMUNITY
Start: 2020-09-24 | End: 2020-09-28

## 2020-09-28 RX ORDER — ROPINIROLE 0.5 MG/1
TABLET, FILM COATED ORAL
COMMUNITY
Start: 2020-09-14

## 2020-09-28 SDOH — HEALTH STABILITY: MENTAL HEALTH: HOW OFTEN DO YOU HAVE A DRINK CONTAINING ALCOHOL?: 2-4 TIMES A MONTH

## 2020-09-28 SDOH — HEALTH STABILITY: MENTAL HEALTH: HOW MANY STANDARD DRINKS CONTAINING ALCOHOL DO YOU HAVE ON A TYPICAL DAY?: 1 OR 2

## 2020-09-28 SDOH — HEALTH STABILITY: PHYSICAL HEALTH: ON AVERAGE, HOW MANY DAYS PER WEEK DO YOU ENGAGE IN MODERATE TO STRENUOUS EXERCISE (LIKE A BRISK WALK)?: 0 DAYS

## 2020-09-28 SDOH — HEALTH STABILITY: PHYSICAL HEALTH: ON AVERAGE, HOW MANY MINUTES DO YOU ENGAGE IN EXERCISE AT THIS LEVEL?: 0 MIN

## 2020-09-28 ASSESSMENT — ENCOUNTER SYMPTOMS
WEIGHT LOSS: 0
SUSPICIOUS LESIONS: 0
FEVER: 0
WEIGHT GAIN: 0
COUGH: 0
CHILLS: 0
HEMOPTYSIS: 0
HEMATOCHEZIA: 0
BRUISES/BLEEDS EASILY: 0
ALLERGIC/IMMUNOLOGIC COMMENTS: NO NEW FOOD ALLERGIES

## 2020-09-30 ENCOUNTER — E-ADVICE (OUTPATIENT)
Dept: CARDIOLOGY | Age: 35
End: 2020-09-30

## 2020-09-30 ENCOUNTER — TELEPHONE (OUTPATIENT)
Dept: CARDIOLOGY | Age: 35
End: 2020-09-30

## 2020-10-01 ENCOUNTER — ORDER TRANSCRIPTION (OUTPATIENT)
Dept: ADMINISTRATIVE | Facility: HOSPITAL | Age: 35
End: 2020-10-01

## 2020-10-01 DIAGNOSIS — Z01.818 PRE-PROCEDURAL EXAMINATION: Primary | ICD-10-CM

## 2020-10-01 DIAGNOSIS — Z11.59 ENCOUNTER FOR SCREENING FOR OTHER VIRAL DISEASES: ICD-10-CM

## 2020-10-04 ENCOUNTER — LAB ENCOUNTER (OUTPATIENT)
Dept: LAB | Facility: HOSPITAL | Age: 35
End: 2020-10-04
Attending: INTERNAL MEDICINE
Payer: COMMERCIAL

## 2020-10-04 DIAGNOSIS — Z01.812 PRE-PROCEDURE LAB EXAM: Primary | ICD-10-CM

## 2020-10-05 LAB
SARS-COV-2 RNA SPEC QL NAA+PROBE: NOT DETECTED
SPECIMEN SOURCE: NORMAL

## 2020-10-07 ENCOUNTER — HOSPITAL ENCOUNTER (OUTPATIENT)
Dept: CV DIAGNOSTICS | Facility: HOSPITAL | Age: 35
Discharge: HOME | End: 2020-10-07
Attending: INTERNAL MEDICINE
Payer: COMMERCIAL

## 2020-10-07 DIAGNOSIS — R94.31 ABNORMAL ELECTROCARDIOGRAM (ECG) (EKG): ICD-10-CM

## 2020-10-07 DIAGNOSIS — E78.00 HYPERCHOLESTEREMIA: ICD-10-CM

## 2020-10-07 DIAGNOSIS — R06.02 SHORTNESS OF BREATH: ICD-10-CM

## 2020-10-07 DIAGNOSIS — R20.2 PARESTHESIA: ICD-10-CM

## 2020-10-07 DIAGNOSIS — I10 HYPERTENSION, ESSENTIAL: ICD-10-CM

## 2020-10-07 PROCEDURE — 93017 CV STRESS TEST TRACING ONLY: CPT | Performed by: INTERNAL MEDICINE

## 2020-10-07 PROCEDURE — 93018 CV STRESS TEST I&R ONLY: CPT | Performed by: INTERNAL MEDICINE

## 2020-10-07 PROCEDURE — 78452 HT MUSCLE IMAGE SPECT MULT: CPT | Performed by: INTERNAL MEDICINE

## 2020-10-07 PROCEDURE — 78451 HT MUSCLE IMAGE SPECT SING: CPT | Performed by: INTERNAL MEDICINE

## 2020-10-09 ENCOUNTER — E-ADVICE (OUTPATIENT)
Dept: CARDIOLOGY | Age: 35
End: 2020-10-09

## 2020-10-12 ENCOUNTER — TELEPHONE (OUTPATIENT)
Dept: CARDIOLOGY | Age: 35
End: 2020-10-12

## 2020-10-12 DIAGNOSIS — I10 ESSENTIAL HYPERTENSION: Primary | ICD-10-CM

## 2020-10-12 DIAGNOSIS — R06.02 SHORTNESS OF BREATH: ICD-10-CM

## 2020-10-14 ENCOUNTER — TELEPHONE (OUTPATIENT)
Dept: CARDIOLOGY | Age: 35
End: 2020-10-14

## 2020-10-17 ENCOUNTER — HOSPITAL ENCOUNTER (OUTPATIENT)
Dept: CV DIAGNOSTICS | Facility: HOSPITAL | Age: 35
Discharge: HOME | End: 2020-10-17
Attending: INTERNAL MEDICINE
Payer: COMMERCIAL

## 2020-10-17 DIAGNOSIS — R06.02 SHORTNESS OF BREATH: ICD-10-CM

## 2020-10-17 DIAGNOSIS — I10 ESSENTIAL HYPERTENSION: ICD-10-CM

## 2020-10-17 PROCEDURE — 78452 HT MUSCLE IMAGE SPECT MULT: CPT | Performed by: INTERNAL MEDICINE

## 2020-10-17 PROCEDURE — 93017 CV STRESS TEST TRACING ONLY: CPT | Performed by: INTERNAL MEDICINE

## 2020-10-17 PROCEDURE — 93018 CV STRESS TEST I&R ONLY: CPT | Performed by: INTERNAL MEDICINE

## 2020-10-21 ENCOUNTER — TELEPHONE (OUTPATIENT)
Dept: CARDIOLOGY | Age: 35
End: 2020-10-21

## 2020-10-21 DIAGNOSIS — R06.02 SHORTNESS OF BREATH: ICD-10-CM

## 2020-10-21 DIAGNOSIS — R94.39 ABNORMAL NUCLEAR STRESS TEST: Primary | ICD-10-CM

## 2020-10-26 ENCOUNTER — E-ADVICE (OUTPATIENT)
Dept: CARDIOLOGY | Age: 35
End: 2020-10-26

## 2020-10-26 NOTE — HISTORICAL OFFICE NOTE
Progress Notes  - documented in this encounter  Elijah Perez MD - 09/28/2020 3:15 PM CDT  Due to COVID-19 ACTION PLAN, the patient's office visit was converted to a video visit. This visit was performed via live interactive two-way video.    Clinician L on a diuretic. Her BNP was normal in the hospital. We will be updating her blood work including a fasting lipid profile. We will check on the echocardiogram just done today and also be obtaining an exercise nuclear stress test for further evaluation.  Her b

## 2020-10-27 ENCOUNTER — APPOINTMENT (OUTPATIENT)
Dept: LAB | Facility: HOSPITAL | Age: 35
End: 2020-10-27
Attending: INTERNAL MEDICINE
Payer: COMMERCIAL

## 2020-10-27 DIAGNOSIS — R94.39 ABNORMAL STRESS TEST: ICD-10-CM

## 2020-10-27 LAB
SARS-COV-2 RNA SPEC QL NAA+PROBE: NOT DETECTED
SPECIMEN SOURCE: NORMAL

## 2020-10-29 ENCOUNTER — HOSPITAL ENCOUNTER (OUTPATIENT)
Dept: INTERVENTIONAL RADIOLOGY/VASCULAR | Facility: HOSPITAL | Age: 35
Discharge: HOME | End: 2020-10-29
Attending: INTERNAL MEDICINE | Admitting: INTERNAL MEDICINE
Payer: COMMERCIAL

## 2020-10-29 VITALS
RESPIRATION RATE: 12 BRPM | WEIGHT: 293 LBS | HEIGHT: 69 IN | SYSTOLIC BLOOD PRESSURE: 107 MMHG | DIASTOLIC BLOOD PRESSURE: 74 MMHG | TEMPERATURE: 97 F | HEART RATE: 92 BPM | OXYGEN SATURATION: 96 % | BODY MASS INDEX: 43.4 KG/M2

## 2020-10-29 DIAGNOSIS — R94.39 ABNORMAL STRESS TEST: Primary | ICD-10-CM

## 2020-10-29 PROCEDURE — 76937 US GUIDE VASCULAR ACCESS: CPT

## 2020-10-29 PROCEDURE — 4A023N7 MEASUREMENT OF CARDIAC SAMPLING AND PRESSURE, LEFT HEART, PERCUTANEOUS APPROACH: ICD-10-PCS | Performed by: INTERNAL MEDICINE

## 2020-10-29 PROCEDURE — B2111ZZ FLUOROSCOPY OF MULTIPLE CORONARY ARTERIES USING LOW OSMOLAR CONTRAST: ICD-10-PCS | Performed by: INTERNAL MEDICINE

## 2020-10-29 PROCEDURE — 93458 L HRT ARTERY/VENTRICLE ANGIO: CPT | Performed by: INTERNAL MEDICINE

## 2020-10-29 PROCEDURE — 93458 L HRT ARTERY/VENTRICLE ANGIO: CPT

## 2020-10-29 PROCEDURE — 99152 MOD SED SAME PHYS/QHP 5/>YRS: CPT

## 2020-10-29 PROCEDURE — B2151ZZ FLUOROSCOPY OF LEFT HEART USING LOW OSMOLAR CONTRAST: ICD-10-PCS | Performed by: INTERNAL MEDICINE

## 2020-10-29 PROCEDURE — 99153 MOD SED SAME PHYS/QHP EA: CPT

## 2020-10-29 RX ORDER — ACETAMINOPHEN 325 MG/1
TABLET ORAL
Status: COMPLETED
Start: 2020-10-29 | End: 2020-10-29

## 2020-10-29 RX ORDER — MIDAZOLAM HYDROCHLORIDE 1 MG/ML
INJECTION INTRAMUSCULAR; INTRAVENOUS
Status: COMPLETED
Start: 2020-10-29 | End: 2020-10-29

## 2020-10-29 RX ORDER — LIDOCAINE HYDROCHLORIDE 10 MG/ML
INJECTION, SOLUTION EPIDURAL; INFILTRATION; INTRACAUDAL; PERINEURAL
Status: COMPLETED
Start: 2020-10-29 | End: 2020-10-29

## 2020-10-29 RX ORDER — SODIUM CHLORIDE 9 MG/ML
INJECTION, SOLUTION INTRAVENOUS
Status: CANCELLED | OUTPATIENT
Start: 2020-10-30 | End: 2020-10-30

## 2020-10-29 RX ORDER — HEPARIN SODIUM 5000 [USP'U]/ML
INJECTION, SOLUTION INTRAVENOUS; SUBCUTANEOUS
Status: COMPLETED
Start: 2020-10-29 | End: 2020-10-29

## 2020-10-29 RX ORDER — ASPIRIN 81 MG/1
TABLET, CHEWABLE ORAL
Status: COMPLETED
Start: 2020-10-29 | End: 2020-10-29

## 2020-10-29 RX ORDER — NITROGLYCERIN 20 MG/100ML
INJECTION INTRAVENOUS
Status: COMPLETED
Start: 2020-10-29 | End: 2020-10-29

## 2020-10-29 RX ORDER — VERAPAMIL HYDROCHLORIDE 2.5 MG/ML
INJECTION, SOLUTION INTRAVENOUS
Status: COMPLETED
Start: 2020-10-29 | End: 2020-10-29

## 2020-10-29 RX ADMIN — ACETAMINOPHEN 650 MG: 325 TABLET ORAL at 13:57:00

## 2020-10-29 NOTE — PROGRESS NOTES
Pt arrived from lab via bed. Rt wrist  puncture site C/d/I soft. Activity restriction reviewed with support person. Call light in reach, HOB flat. Will continue to monitor. 1245 TR band release initiated. Site wnl.   1330: tr band removed.  Site dressed

## 2020-10-29 NOTE — PROCEDURES
[address]    PATIENT'S NAME: Neomia Flow   ATTENDING PHYSICIAN: Jessica Maddox. Heath Sanchez M.D. OPERATING PHYSICIAN: Cali Zhang M.D.    PATIENT ACCOUNT#:   [de-identified]    LOCATION:  [address]  MEDICAL RECORD #:   [id number]       DATE significant disease. The left main bifurcates into the left anterior descending and circumflex coronary arteries. Left anterior descending artery appears to have no significant disease.   There are 2 diagonal branches noted which are free of significant d

## 2020-10-29 NOTE — H&P
Ms. Marisol Childs is a pleasant 34-year-old lady with recent complaints of shortness of breath and was found to be hypertensive. She was placed on Bumex but has not felt a whole lot better.  She had a CTA of her chest that did not reveal any pulmonary embolism an she follow-up with a pulmonary physician as well as GI and follow-up along with her primary care physician. Will base further treatment decisions after the above cardiac diagnostic studies are performed and will follow this very nice lady in office.     Med

## 2020-12-11 ENCOUNTER — CLINICAL ABSTRACT (OUTPATIENT)
Dept: HEALTH INFORMATION MANAGEMENT | Age: 35
End: 2020-12-11

## 2020-12-30 RX ORDER — LISINOPRIL 30 MG/1
TABLET ORAL
Qty: 90 TABLET | Refills: 3 | Status: SHIPPED | OUTPATIENT
Start: 2020-12-30

## 2021-04-23 ENCOUNTER — HOSPITAL ENCOUNTER (OUTPATIENT)
Dept: ULTRASOUND IMAGING | Age: 36
Discharge: HOME | End: 2021-04-23
Attending: INTERNAL MEDICINE
Payer: COMMERCIAL

## 2021-04-23 DIAGNOSIS — K75.81 NASH (NONALCOHOLIC STEATOHEPATITIS): ICD-10-CM

## 2021-04-23 PROCEDURE — 76700 US EXAM ABDOM COMPLETE: CPT | Performed by: INTERNAL MEDICINE

## 2021-04-23 PROCEDURE — 76981 USE PARENCHYMA: CPT | Performed by: INTERNAL MEDICINE

## 2023-02-26 ENCOUNTER — HOSPITAL ENCOUNTER (EMERGENCY)
Age: 38
Discharge: HOME | End: 2023-02-26
Attending: EMERGENCY MEDICINE
Payer: COMMERCIAL

## 2023-02-26 ENCOUNTER — APPOINTMENT (OUTPATIENT)
Dept: CT IMAGING | Age: 38
End: 2023-02-26
Attending: EMERGENCY MEDICINE
Payer: COMMERCIAL

## 2023-02-26 VITALS
RESPIRATION RATE: 16 BRPM | TEMPERATURE: 98 F | SYSTOLIC BLOOD PRESSURE: 135 MMHG | HEIGHT: 69 IN | DIASTOLIC BLOOD PRESSURE: 72 MMHG | BODY MASS INDEX: 36.43 KG/M2 | HEART RATE: 75 BPM | OXYGEN SATURATION: 99 % | WEIGHT: 246 LBS

## 2023-02-26 DIAGNOSIS — N39.0 URINARY TRACT INFECTION WITH HEMATURIA, SITE UNSPECIFIED: Primary | ICD-10-CM

## 2023-02-26 DIAGNOSIS — R31.9 URINARY TRACT INFECTION WITH HEMATURIA, SITE UNSPECIFIED: Primary | ICD-10-CM

## 2023-02-26 LAB
ALBUMIN SERPL-MCNC: 3.7 G/DL (ref 3.4–5)
ALBUMIN/GLOB SERPL: 0.8 {RATIO} (ref 1–2)
ALP LIVER SERPL-CCNC: 51 U/L
ALT SERPL-CCNC: 28 U/L
ANION GAP SERPL CALC-SCNC: 5 MMOL/L (ref 0–18)
AST SERPL-CCNC: 23 U/L (ref 15–37)
BASOPHILS # BLD AUTO: 0.03 X10(3) UL (ref 0–0.2)
BASOPHILS NFR BLD AUTO: 0.4 %
BILIRUB SERPL-MCNC: 0.7 MG/DL (ref 0.1–2)
BILIRUB UR QL STRIP.AUTO: NEGATIVE
BUN BLD-MCNC: 9 MG/DL (ref 7–18)
CALCIUM BLD-MCNC: 9 MG/DL (ref 8.5–10.1)
CHLORIDE SERPL-SCNC: 105 MMOL/L (ref 98–112)
CO2 SERPL-SCNC: 27 MMOL/L (ref 21–32)
CREAT BLD-MCNC: 0.8 MG/DL
EOSINOPHIL # BLD AUTO: 0.15 X10(3) UL (ref 0–0.7)
EOSINOPHIL NFR BLD AUTO: 2.1 %
ERYTHROCYTE [DISTWIDTH] IN BLOOD BY AUTOMATED COUNT: 14.5 %
GFR SERPLBLD BASED ON 1.73 SQ M-ARVRAT: 97 ML/MIN/1.73M2 (ref 60–?)
GLOBULIN PLAS-MCNC: 4.4 G/DL (ref 2.8–4.4)
GLUCOSE BLD-MCNC: 97 MG/DL (ref 70–99)
GLUCOSE UR STRIP.AUTO-MCNC: NEGATIVE MG/DL
HCT VFR BLD AUTO: 38.5 %
HGB BLD-MCNC: 12.2 G/DL
IMM GRANULOCYTES # BLD AUTO: 0.03 X10(3) UL (ref 0–1)
IMM GRANULOCYTES NFR BLD: 0.4 %
KETONES UR STRIP.AUTO-MCNC: NEGATIVE MG/DL
LIPASE SERPL-CCNC: 161 U/L (ref 73–393)
LIPASE SERPL-CCNC: 45 U/L (ref 13–75)
LYMPHOCYTES # BLD AUTO: 1.71 X10(3) UL (ref 1–4)
LYMPHOCYTES NFR BLD AUTO: 24 %
MCH RBC QN AUTO: 27.8 PG (ref 26–34)
MCHC RBC AUTO-ENTMCNC: 31.7 G/DL (ref 31–37)
MCV RBC AUTO: 87.7 FL
MONOCYTES # BLD AUTO: 0.43 X10(3) UL (ref 0.1–1)
MONOCYTES NFR BLD AUTO: 6 %
NEUTROPHILS # BLD AUTO: 4.77 X10 (3) UL (ref 1.5–7.7)
NEUTROPHILS # BLD AUTO: 4.77 X10(3) UL (ref 1.5–7.7)
NEUTROPHILS NFR BLD AUTO: 67.1 %
NITRITE UR QL STRIP.AUTO: NEGATIVE
OSMOLALITY SERPL CALC.SUM OF ELEC: 283 MOSM/KG (ref 275–295)
PH UR STRIP.AUTO: 6 [PH] (ref 5–8)
PLATELET # BLD AUTO: 272 10(3)UL (ref 150–450)
POTASSIUM SERPL-SCNC: 4 MMOL/L (ref 3.5–5.1)
PROT SERPL-MCNC: 8.1 G/DL (ref 6.4–8.2)
RBC # BLD AUTO: 4.39 X10(6)UL
RBC #/AREA URNS AUTO: >10 /HPF
SODIUM SERPL-SCNC: 137 MMOL/L (ref 136–145)
SP GR UR STRIP.AUTO: 1.01 (ref 1–1.03)
UROBILINOGEN UR STRIP.AUTO-MCNC: 0.2 MG/DL
WBC # BLD AUTO: 7.1 X10(3) UL (ref 4–11)

## 2023-02-26 PROCEDURE — 85025 COMPLETE CBC W/AUTO DIFF WBC: CPT | Performed by: EMERGENCY MEDICINE

## 2023-02-26 PROCEDURE — 74176 CT ABD & PELVIS W/O CONTRAST: CPT | Performed by: EMERGENCY MEDICINE

## 2023-02-26 PROCEDURE — 87086 URINE CULTURE/COLONY COUNT: CPT | Performed by: EMERGENCY MEDICINE

## 2023-02-26 PROCEDURE — 80053 COMPREHEN METABOLIC PANEL: CPT | Performed by: EMERGENCY MEDICINE

## 2023-02-26 PROCEDURE — 99284 EMERGENCY DEPT VISIT MOD MDM: CPT

## 2023-02-26 PROCEDURE — 83690 ASSAY OF LIPASE: CPT | Performed by: EMERGENCY MEDICINE

## 2023-02-26 PROCEDURE — 99285 EMERGENCY DEPT VISIT HI MDM: CPT

## 2023-02-26 PROCEDURE — 96374 THER/PROPH/DIAG INJ IV PUSH: CPT

## 2023-02-26 PROCEDURE — 96361 HYDRATE IV INFUSION ADD-ON: CPT

## 2023-02-26 PROCEDURE — 81001 URINALYSIS AUTO W/SCOPE: CPT | Performed by: EMERGENCY MEDICINE

## 2023-02-26 PROCEDURE — 81015 MICROSCOPIC EXAM OF URINE: CPT | Performed by: EMERGENCY MEDICINE

## 2023-02-26 RX ORDER — HYDROCODONE BITARTRATE AND ACETAMINOPHEN 5; 325 MG/1; MG/1
1-2 TABLET ORAL EVERY 6 HOURS PRN
Qty: 12 TABLET | Refills: 0 | Status: SHIPPED | OUTPATIENT
Start: 2023-02-26 | End: 2023-02-26

## 2023-02-26 RX ORDER — HYDROCODONE BITARTRATE AND ACETAMINOPHEN 5; 325 MG/1; MG/1
1-2 TABLET ORAL EVERY 6 HOURS PRN
Qty: 10 TABLET | Refills: 0 | Status: SHIPPED | OUTPATIENT
Start: 2023-02-26 | End: 2023-02-26

## 2023-02-26 RX ORDER — CEFDINIR 300 MG/1
300 CAPSULE ORAL 2 TIMES DAILY
Qty: 14 CAPSULE | Refills: 0 | Status: SHIPPED | OUTPATIENT
Start: 2023-02-26 | End: 2023-02-26

## 2023-02-26 RX ORDER — KETOROLAC TROMETHAMINE 15 MG/ML
15 INJECTION, SOLUTION INTRAMUSCULAR; INTRAVENOUS ONCE
Status: COMPLETED | OUTPATIENT
Start: 2023-02-26 | End: 2023-02-26

## 2023-02-26 RX ORDER — HYDROCODONE BITARTRATE AND ACETAMINOPHEN 5; 325 MG/1; MG/1
1-2 TABLET ORAL EVERY 6 HOURS PRN
Qty: 10 TABLET | Refills: 0 | Status: SHIPPED | OUTPATIENT
Start: 2023-02-26 | End: 2023-03-05

## 2023-02-26 RX ORDER — CEFDINIR 300 MG/1
300 CAPSULE ORAL 2 TIMES DAILY
Qty: 14 CAPSULE | Refills: 0 | Status: SHIPPED | OUTPATIENT
Start: 2023-02-26 | End: 2023-03-05

## 2023-07-05 ENCOUNTER — APPOINTMENT (OUTPATIENT)
Dept: ULTRASOUND IMAGING | Age: 38
End: 2023-07-05
Attending: EMERGENCY MEDICINE
Payer: COMMERCIAL

## 2023-07-05 ENCOUNTER — HOSPITAL ENCOUNTER (EMERGENCY)
Age: 38
Discharge: HOME | End: 2023-07-05
Attending: EMERGENCY MEDICINE
Payer: COMMERCIAL

## 2023-07-05 VITALS
TEMPERATURE: 99 F | SYSTOLIC BLOOD PRESSURE: 129 MMHG | DIASTOLIC BLOOD PRESSURE: 88 MMHG | HEART RATE: 64 BPM | WEIGHT: 220 LBS | BODY MASS INDEX: 32 KG/M2 | OXYGEN SATURATION: 98 % | RESPIRATION RATE: 16 BRPM

## 2023-07-05 DIAGNOSIS — O20.0 THREATENED MISCARRIAGE IN EARLY PREGNANCY: Primary | ICD-10-CM

## 2023-07-05 LAB
B-HCG SERPL-ACNC: 138 MIU/ML
BASOPHILS # BLD AUTO: 0.07 X10(3) UL (ref 0–0.2)
BASOPHILS NFR BLD AUTO: 0.9 %
EOSINOPHIL # BLD AUTO: 0.2 X10(3) UL (ref 0–0.7)
EOSINOPHIL NFR BLD AUTO: 2.4 %
ERYTHROCYTE [DISTWIDTH] IN BLOOD BY AUTOMATED COUNT: 13.6 %
HCT VFR BLD AUTO: 37.4 %
HGB BLD-MCNC: 12.6 G/DL
IMM GRANULOCYTES # BLD AUTO: 0.04 X10(3) UL (ref 0–1)
IMM GRANULOCYTES NFR BLD: 0.5 %
LYMPHOCYTES # BLD AUTO: 2.87 X10(3) UL (ref 1–4)
LYMPHOCYTES NFR BLD AUTO: 35.1 %
MCH RBC QN AUTO: 29.6 PG (ref 26–34)
MCHC RBC AUTO-ENTMCNC: 33.7 G/DL (ref 31–37)
MCV RBC AUTO: 88 FL
MONOCYTES # BLD AUTO: 0.43 X10(3) UL (ref 0.1–1)
MONOCYTES NFR BLD AUTO: 5.3 %
NEUTROPHILS # BLD AUTO: 4.57 X10 (3) UL (ref 1.5–7.7)
NEUTROPHILS # BLD AUTO: 4.57 X10(3) UL (ref 1.5–7.7)
NEUTROPHILS NFR BLD AUTO: 55.8 %
PLATELET # BLD AUTO: 285 10(3)UL (ref 150–450)
RBC # BLD AUTO: 4.25 X10(6)UL
RH BLOOD TYPE: POSITIVE
WBC # BLD AUTO: 8.2 X10(3) UL (ref 4–11)

## 2023-07-05 PROCEDURE — 96361 HYDRATE IV INFUSION ADD-ON: CPT

## 2023-07-05 PROCEDURE — 76817 TRANSVAGINAL US OBSTETRIC: CPT | Performed by: EMERGENCY MEDICINE

## 2023-07-05 PROCEDURE — 96360 HYDRATION IV INFUSION INIT: CPT

## 2023-07-05 PROCEDURE — 86901 BLOOD TYPING SEROLOGIC RH(D): CPT | Performed by: EMERGENCY MEDICINE

## 2023-07-05 PROCEDURE — 99284 EMERGENCY DEPT VISIT MOD MDM: CPT

## 2023-07-05 PROCEDURE — 76801 OB US < 14 WKS SINGLE FETUS: CPT | Performed by: EMERGENCY MEDICINE

## 2023-07-05 PROCEDURE — 85025 COMPLETE CBC W/AUTO DIFF WBC: CPT | Performed by: EMERGENCY MEDICINE

## 2023-07-05 PROCEDURE — 86900 BLOOD TYPING SEROLOGIC ABO: CPT | Performed by: EMERGENCY MEDICINE

## 2023-07-05 PROCEDURE — 84702 CHORIONIC GONADOTROPIN TEST: CPT | Performed by: EMERGENCY MEDICINE

## 2023-07-05 RX ORDER — ALPRAZOLAM 0.5 MG
0.5 TABLET ORAL 4 TIMES DAILY PRN
COMMUNITY
Start: 2023-06-27

## 2023-07-05 RX ORDER — SUMATRIPTAN 100 MG/1
1 TABLET, FILM COATED ORAL
COMMUNITY
Start: 2022-02-14

## 2023-07-05 RX ORDER — SODIUM CHLORIDE 9 MG/ML
125 INJECTION, SOLUTION INTRAVENOUS CONTINUOUS
Status: DISCONTINUED | OUTPATIENT
Start: 2023-07-05 | End: 2023-07-06

## 2023-09-29 ENCOUNTER — HOSPITAL ENCOUNTER (OUTPATIENT)
Facility: HOSPITAL | Age: 38
Setting detail: OBSERVATION
Discharge: HOME | End: 2023-09-30
Attending: EMERGENCY MEDICINE | Admitting: INTERNAL MEDICINE
Payer: COMMERCIAL

## 2023-09-29 ENCOUNTER — APPOINTMENT (OUTPATIENT)
Dept: GENERAL RADIOLOGY | Facility: HOSPITAL | Age: 38
End: 2023-09-29
Payer: COMMERCIAL

## 2023-09-29 DIAGNOSIS — E03.9 HYPOTHYROIDISM, UNSPECIFIED TYPE: ICD-10-CM

## 2023-09-29 DIAGNOSIS — R00.2 PALPITATIONS: ICD-10-CM

## 2023-09-29 DIAGNOSIS — R07.89 CHEST PRESSURE: ICD-10-CM

## 2023-09-29 DIAGNOSIS — R55 SYNCOPE, NEAR: ICD-10-CM

## 2023-09-29 DIAGNOSIS — I49.8 BIGEMINY: Primary | ICD-10-CM

## 2023-09-29 LAB
ALBUMIN SERPL-MCNC: 3.7 G/DL (ref 3.4–5)
ALBUMIN/GLOB SERPL: 0.9 {RATIO} (ref 1–2)
ALP LIVER SERPL-CCNC: 37 U/L
ALT SERPL-CCNC: 39 U/L
ANION GAP SERPL CALC-SCNC: 4 MMOL/L (ref 0–18)
AST SERPL-CCNC: 33 U/L (ref 15–37)
B-HCG UR QL: NEGATIVE
BASOPHILS # BLD AUTO: 0.05 X10(3) UL (ref 0–0.2)
BASOPHILS NFR BLD AUTO: 0.7 %
BILIRUB SERPL-MCNC: 0.6 MG/DL (ref 0.1–2)
BUN BLD-MCNC: 11 MG/DL (ref 7–18)
CALCIUM BLD-MCNC: 9.1 MG/DL (ref 8.5–10.1)
CHLORIDE SERPL-SCNC: 104 MMOL/L (ref 98–112)
CO2 SERPL-SCNC: 27 MMOL/L (ref 21–32)
CREAT BLD-MCNC: 1.25 MG/DL
EGFRCR SERPLBLD CKD-EPI 2021: 56 ML/MIN/1.73M2 (ref 60–?)
EOSINOPHIL # BLD AUTO: 0.28 X10(3) UL (ref 0–0.7)
EOSINOPHIL NFR BLD AUTO: 3.8 %
ERYTHROCYTE [DISTWIDTH] IN BLOOD BY AUTOMATED COUNT: 13.6 %
GLOBULIN PLAS-MCNC: 4.3 G/DL (ref 2.8–4.4)
GLUCOSE BLD-MCNC: 102 MG/DL (ref 70–99)
HCT VFR BLD AUTO: 38.4 %
HGB BLD-MCNC: 12.8 G/DL
IMM GRANULOCYTES # BLD AUTO: 0.02 X10(3) UL (ref 0–1)
IMM GRANULOCYTES NFR BLD: 0.3 %
LYMPHOCYTES # BLD AUTO: 2.59 X10(3) UL (ref 1–4)
LYMPHOCYTES NFR BLD AUTO: 35.2 %
MAGNESIUM SERPL-MCNC: 2.4 MG/DL (ref 1.6–2.6)
MCH RBC QN AUTO: 29.8 PG (ref 26–34)
MCHC RBC AUTO-ENTMCNC: 33.3 G/DL (ref 31–37)
MCV RBC AUTO: 89.5 FL
MONOCYTES # BLD AUTO: 0.38 X10(3) UL (ref 0.1–1)
MONOCYTES NFR BLD AUTO: 5.2 %
NEUTROPHILS # BLD AUTO: 4.04 X10 (3) UL (ref 1.5–7.7)
NEUTROPHILS # BLD AUTO: 4.04 X10(3) UL (ref 1.5–7.7)
NEUTROPHILS NFR BLD AUTO: 54.8 %
OSMOLALITY SERPL CALC.SUM OF ELEC: 280 MOSM/KG (ref 275–295)
PLATELET # BLD AUTO: 294 10(3)UL (ref 150–450)
POTASSIUM SERPL-SCNC: 4.1 MMOL/L (ref 3.5–5.1)
PROT SERPL-MCNC: 8 G/DL (ref 6.4–8.2)
RBC # BLD AUTO: 4.29 X10(6)UL
SODIUM SERPL-SCNC: 135 MMOL/L (ref 136–145)
T4 FREE SERPL-MCNC: 0.6 NG/DL (ref 0.8–1.7)
TROPONIN I HIGH SENSITIVITY: 6 NG/L
TSI SER-ACNC: >100 MIU/ML (ref 0.36–3.74)
WBC # BLD AUTO: 7.4 X10(3) UL (ref 4–11)

## 2023-09-29 PROCEDURE — 99223 1ST HOSP IP/OBS HIGH 75: CPT | Performed by: INTERNAL MEDICINE

## 2023-09-29 PROCEDURE — 71045 X-RAY EXAM CHEST 1 VIEW: CPT

## 2023-09-29 RX ORDER — ASPIRIN 81 MG/1
324 TABLET, CHEWABLE ORAL ONCE
Status: COMPLETED | OUTPATIENT
Start: 2023-09-29 | End: 2023-09-29

## 2023-09-29 NOTE — ED INITIAL ASSESSMENT (HPI)
Pt states she was in ER 2 days ago for racing heart. Pt states she was discharged with a heart monitor for 24hr. Pt states nausea, chest pain and rapid heart rate.

## 2023-09-30 ENCOUNTER — APPOINTMENT (OUTPATIENT)
Dept: CV DIAGNOSTICS | Facility: HOSPITAL | Age: 38
End: 2023-09-30
Attending: INTERNAL MEDICINE
Payer: COMMERCIAL

## 2023-09-30 VITALS
RESPIRATION RATE: 16 BRPM | SYSTOLIC BLOOD PRESSURE: 112 MMHG | HEART RATE: 67 BPM | HEIGHT: 68 IN | DIASTOLIC BLOOD PRESSURE: 71 MMHG | OXYGEN SATURATION: 95 % | BODY MASS INDEX: 32.13 KG/M2 | TEMPERATURE: 98 F | WEIGHT: 212 LBS

## 2023-09-30 PROBLEM — I49.3 PVC (PREMATURE VENTRICULAR CONTRACTION): Status: ACTIVE | Noted: 2023-09-30

## 2023-09-30 PROBLEM — R00.2 PALPITATION: Status: ACTIVE | Noted: 2023-09-30

## 2023-09-30 PROBLEM — E03.9 HYPOTHYROIDISM, UNSPECIFIED TYPE: Status: ACTIVE | Noted: 2023-09-30

## 2023-09-30 PROBLEM — F41.9 ANXIETY AND DEPRESSION: Status: ACTIVE | Noted: 2023-09-30

## 2023-09-30 PROBLEM — R00.2 PALPITATIONS: Status: ACTIVE | Noted: 2023-09-30

## 2023-09-30 PROBLEM — R55 SYNCOPE, NEAR: Status: ACTIVE | Noted: 2023-09-30

## 2023-09-30 PROBLEM — I10 PRIMARY HYPERTENSION: Status: ACTIVE | Noted: 2023-09-30

## 2023-09-30 PROBLEM — R07.89 CHEST PRESSURE: Status: ACTIVE | Noted: 2023-09-30

## 2023-09-30 PROBLEM — R07.89 OTHER CHEST PAIN: Status: ACTIVE | Noted: 2023-09-30

## 2023-09-30 PROBLEM — F32.A ANXIETY AND DEPRESSION: Status: ACTIVE | Noted: 2023-09-30

## 2023-09-30 LAB
ALBUMIN SERPL-MCNC: 3 G/DL (ref 3.4–5)
ALBUMIN/GLOB SERPL: 0.8 {RATIO} (ref 1–2)
ALP LIVER SERPL-CCNC: 30 U/L
ALT SERPL-CCNC: 32 U/L
ANION GAP SERPL CALC-SCNC: 3 MMOL/L (ref 0–18)
AST SERPL-CCNC: 17 U/L (ref 15–37)
BASOPHILS # BLD AUTO: 0.03 X10(3) UL (ref 0–0.2)
BASOPHILS NFR BLD AUTO: 0.5 %
BILIRUB SERPL-MCNC: 0.5 MG/DL (ref 0.1–2)
BUN BLD-MCNC: 11 MG/DL (ref 7–18)
CALCIUM BLD-MCNC: 8.2 MG/DL (ref 8.5–10.1)
CHLORIDE SERPL-SCNC: 107 MMOL/L (ref 98–112)
CHOLEST SERPL-MCNC: 233 MG/DL (ref ?–200)
CO2 SERPL-SCNC: 29 MMOL/L (ref 21–32)
CREAT BLD-MCNC: 1.07 MG/DL
EGFRCR SERPLBLD CKD-EPI 2021: 68 ML/MIN/1.73M2 (ref 60–?)
EOSINOPHIL # BLD AUTO: 0.3 X10(3) UL (ref 0–0.7)
EOSINOPHIL NFR BLD AUTO: 4.7 %
ERYTHROCYTE [DISTWIDTH] IN BLOOD BY AUTOMATED COUNT: 13.7 %
GLOBULIN PLAS-MCNC: 3.7 G/DL (ref 2.8–4.4)
GLUCOSE BLD-MCNC: 81 MG/DL (ref 70–99)
HCT VFR BLD AUTO: 33 %
HDLC SERPL-MCNC: 57 MG/DL (ref 40–59)
HGB BLD-MCNC: 11.1 G/DL
IMM GRANULOCYTES # BLD AUTO: 0.03 X10(3) UL (ref 0–1)
IMM GRANULOCYTES NFR BLD: 0.5 %
LDLC SERPL CALC-MCNC: 150 MG/DL (ref ?–100)
LYMPHOCYTES # BLD AUTO: 3.12 X10(3) UL (ref 1–4)
LYMPHOCYTES NFR BLD AUTO: 48.4 %
MCH RBC QN AUTO: 29.8 PG (ref 26–34)
MCHC RBC AUTO-ENTMCNC: 33.6 G/DL (ref 31–37)
MCV RBC AUTO: 88.7 FL
MONOCYTES # BLD AUTO: 0.45 X10(3) UL (ref 0.1–1)
MONOCYTES NFR BLD AUTO: 7 %
NEUTROPHILS # BLD AUTO: 2.52 X10 (3) UL (ref 1.5–7.7)
NEUTROPHILS # BLD AUTO: 2.52 X10(3) UL (ref 1.5–7.7)
NEUTROPHILS NFR BLD AUTO: 38.9 %
NONHDLC SERPL-MCNC: 176 MG/DL (ref ?–130)
NT-PROBNP SERPL-MCNC: 98 PG/ML (ref ?–125)
OSMOLALITY SERPL CALC.SUM OF ELEC: 286 MOSM/KG (ref 275–295)
PLATELET # BLD AUTO: 219 10(3)UL (ref 150–450)
POTASSIUM SERPL-SCNC: 3.7 MMOL/L (ref 3.5–5.1)
PROT SERPL-MCNC: 6.7 G/DL (ref 6.4–8.2)
RBC # BLD AUTO: 3.72 X10(6)UL
SODIUM SERPL-SCNC: 139 MMOL/L (ref 136–145)
TRIGL SERPL-MCNC: 145 MG/DL (ref 30–149)
TROPONIN I HIGH SENSITIVITY: 5 NG/L
TROPONIN I HIGH SENSITIVITY: 5 NG/L
TROPONIN I HIGH SENSITIVITY: 6 NG/L
VLDLC SERPL CALC-MCNC: 28 MG/DL (ref 0–30)
WBC # BLD AUTO: 6.5 X10(3) UL (ref 4–11)

## 2023-09-30 PROCEDURE — 99239 HOSP IP/OBS DSCHRG MGMT >30: CPT | Performed by: HOSPITALIST

## 2023-09-30 PROCEDURE — 93306 TTE W/DOPPLER COMPLETE: CPT | Performed by: INTERNAL MEDICINE

## 2023-09-30 RX ORDER — ROPINIROLE 0.5 MG/1
0.5 TABLET, FILM COATED ORAL 2 TIMES DAILY
Status: DISCONTINUED | OUTPATIENT
Start: 2023-09-30 | End: 2023-09-30

## 2023-09-30 RX ORDER — LEVOTHYROXINE SODIUM 0.15 MG/1
150 TABLET ORAL
Status: DISCONTINUED | OUTPATIENT
Start: 2023-09-30 | End: 2023-09-30

## 2023-09-30 RX ORDER — ASPIRIN 325 MG
325 TABLET ORAL DAILY
Status: DISCONTINUED | OUTPATIENT
Start: 2023-09-30 | End: 2023-09-30

## 2023-09-30 RX ORDER — LEVOTHYROXINE SODIUM 0.15 MG/1
300 TABLET ORAL
Status: DISCONTINUED | OUTPATIENT
Start: 2023-09-30 | End: 2023-09-30

## 2023-09-30 RX ORDER — MELATONIN
3 NIGHTLY PRN
Status: DISCONTINUED | OUTPATIENT
Start: 2023-09-30 | End: 2023-09-30

## 2023-09-30 RX ORDER — ACETAMINOPHEN 500 MG
500 TABLET ORAL EVERY 4 HOURS PRN
Status: DISCONTINUED | OUTPATIENT
Start: 2023-09-30 | End: 2023-09-30

## 2023-09-30 RX ORDER — LEVOTHYROXINE SODIUM 0.12 MG/1
125 TABLET ORAL
Status: DISCONTINUED | OUTPATIENT
Start: 2023-09-30 | End: 2023-09-30

## 2023-09-30 RX ORDER — POTASSIUM CHLORIDE 20 MEQ/1
40 TABLET, EXTENDED RELEASE ORAL ONCE
Status: COMPLETED | OUTPATIENT
Start: 2023-09-30 | End: 2023-09-30

## 2023-09-30 RX ORDER — ALPRAZOLAM 1 MG/1
0.5 TABLET ORAL 4 TIMES DAILY PRN
Status: DISCONTINUED | OUTPATIENT
Start: 2023-09-30 | End: 2023-09-30

## 2023-09-30 RX ORDER — ONDANSETRON 2 MG/ML
4 INJECTION INTRAMUSCULAR; INTRAVENOUS EVERY 6 HOURS PRN
Status: DISCONTINUED | OUTPATIENT
Start: 2023-09-30 | End: 2023-09-30

## 2023-09-30 RX ORDER — PANTOPRAZOLE SODIUM 20 MG/1
20 TABLET, DELAYED RELEASE ORAL
Status: DISCONTINUED | OUTPATIENT
Start: 2023-09-30 | End: 2023-09-30

## 2023-09-30 RX ORDER — ENOXAPARIN SODIUM 100 MG/ML
40 INJECTION SUBCUTANEOUS DAILY
Status: DISCONTINUED | OUTPATIENT
Start: 2023-09-30 | End: 2023-09-30

## 2023-09-30 RX ORDER — NITROGLYCERIN 0.4 MG/1
0.4 TABLET SUBLINGUAL EVERY 5 MIN PRN
Status: DISCONTINUED | OUTPATIENT
Start: 2023-09-30 | End: 2023-09-30

## 2023-09-30 RX ORDER — LEVOTHYROXINE SODIUM 300 UG/1
300 TABLET ORAL DAILY
COMMUNITY

## 2023-09-30 NOTE — PLAN OF CARE
Problem: CARDIOVASCULAR - ADULT  Goal: Maintains optimal cardiac output and hemodynamic stability  Description: INTERVENTIONS:  - Monitor vital signs, rhythm, and trends  - Monitor for bleeding, hypotension and signs of decreased cardiac output  - Evaluate effectiveness of vasoactive medications to optimize hemodynamic stability  - Monitor arterial and/or venous puncture sites for bleeding and/or hematoma  - Assess quality of pulses, skin color and temperature  - Assess for signs of decreased coronary artery perfusion - ex.  Angina  - Evaluate fluid balance, assess for edema, trend weights  9/30/2023 1327 by Carlos Allison RN  Outcome: Adequate for Discharge  9/30/2023 0851 by Carlos Allison RN  Outcome: Progressing  Goal: Absence of cardiac arrhythmias or at baseline  Description: INTERVENTIONS:  - Continuous cardiac monitoring, monitor vital signs, obtain 12 lead EKG if indicated  - Evaluate effectiveness of antiarrhythmic and heart rate control medications as ordered  - Initiate emergency measures for life threatening arrhythmias  - Monitor electrolytes and administer replacement therapy as ordered  9/30/2023 1327 by Carlos Allison RN  Outcome: Adequate for Discharge  9/30/2023 0851 by Carlos Allison RN  Outcome: Progressing     Problem: RESPIRATORY - ADULT  Goal: Achieves optimal ventilation and oxygenation  Description: INTERVENTIONS:  - Assess for changes in respiratory status  - Assess for changes in mentation and behavior  - Position to facilitate oxygenation and minimize respiratory effort  - Oxygen supplementation based on oxygen saturation or ABGs  - Provide Smoking Cessation handout, if applicable  - Encourage broncho-pulmonary hygiene including cough, deep breathe, Incentive Spirometry  - Assess the need for suctioning and perform as needed  - Assess and instruct to report SOB or any respiratory difficulty  - Respiratory Therapy support as indicated  - Manage/alleviate anxiety  - Monitor for signs/symptoms of CO2 retention  9/30/2023 1327 by Catrachita Henry RN  Outcome: Adequate for Discharge  9/30/2023 0851 by Catrachita Henry RN  Outcome: Progressing     Problem: METABOLIC/FLUID AND ELECTROLYTES - ADULT  Goal: Electrolytes maintained within normal limits  Description: INTERVENTIONS:  - Monitor labs and rhythm and assess patient for signs and symptoms of electrolyte imbalances  - Administer electrolyte replacement as ordered  - Monitor response to electrolyte replacements, including rhythm and repeat lab results as appropriate  - Fluid restriction as ordered  - Instruct patient on fluid and nutrition restrictions as appropriate  9/30/2023 1327 by Catrachita Henry RN  Outcome: Adequate for Discharge  9/30/2023 0851 by Catrachita Henry RN  Outcome: Progressing     Problem: SKIN/TISSUE INTEGRITY - ADULT  Goal: Skin integrity remains intact  Description: INTERVENTIONS  - Assess and document risk factors for pressure ulcer development  - Assess and document skin integrity  - Monitor for areas of redness and/or skin breakdown  - Initiate interventions, skin care algorithm/standards of care as needed  9/30/2023 1327 by Catrachita Henry RN  Outcome: Adequate for Discharge  9/30/2023 0851 by Catrachita Henry RN  Outcome: Progressing     Problem: PAIN - ADULT  Goal: Verbalizes/displays adequate comfort level or patient's stated pain goal  Description: INTERVENTIONS:  - Encourage pt to monitor pain and request assistance  - Assess pain using appropriate pain scale  - Administer analgesics based on type and severity of pain and evaluate response  - Implement non-pharmacological measures as appropriate and evaluate response  - Consider cultural and social influences on pain and pain management  - Manage/alleviate anxiety  - Utilize distraction and/or relaxation techniques  - Monitor for opioid side effects  - Notify MD/LIP if interventions unsuccessful or patient reports new pain  - Anticipate increased pain with activity and pre-medicate as appropriate  9/30/2023 1327 by Lele Kolb RN  Outcome: Adequate for Discharge  9/30/2023 0851 by Lele Kolb RN  Outcome: Progressing     Problem: RISK FOR INFECTION - ADULT  Goal: Absence of fever/infection during anticipated neutropenic period  Description: INTERVENTIONS  - Monitor WBC  - Administer growth factors as ordered  - Implement neutropenic guidelines  9/30/2023 1327 by Lele Kolb RN  Outcome: Adequate for Discharge  9/30/2023 0851 by Lele Kolb RN  Outcome: Progressing     Problem: SAFETY ADULT - FALL  Goal: Free from fall injury  Description: INTERVENTIONS:  - Assess pt frequently for physical needs  - Identify cognitive and physical deficits and behaviors that affect risk of falls.   - [place] fall precautions as indicated by assessment.  - Educate pt/family on patient safety including physical limitations  - Instruct pt to call for assistance with activity based on assessment  - Modify environment to reduce risk of injury  - Provide assistive devices as appropriate  - Consider OT/PT consult to assist with strengthening/mobility  - Encourage toileting schedule  9/30/2023 1327 by Lele Kolb RN  Outcome: Adequate for Discharge  9/30/2023 0851 by Lele Kolb RN  Outcome: Progressing     Problem: DISCHARGE PLANNING  Goal: Discharge to home or other facility with appropriate resources  Description: INTERVENTIONS:  - Identify barriers to discharge w/pt and caregiver  - Include patient/family/discharge partner in discharge planning  - Arrange for needed discharge resources and transportation as appropriate  - Identify discharge learning needs (meds, wound care, etc)  - Arrange for interpreters to assist at discharge as needed  - Consider post-discharge preferences of patient/family/discharge partner  - Complete POLST form as appropriate  - Assess patient's ability to be responsible for managing their own health  - Refer to Case Management Department for coordinating discharge planning if the patient needs post-hospital services based on physician/LIP order or complex needs related to functional status, cognitive ability or social support system  9/30/2023 1327 by Giselle Gil RN  Outcome: Adequate for Discharge  9/30/2023 0851 by Giselle Gil RN  Outcome: Progressing

## 2023-09-30 NOTE — PLAN OF CARE
Pt A&Ox 4. On RA. NSR w PVC's bigeminal on tele; no c/o cardiac symptoms. Pt c.o chest pain 2/10; seems to be resting comfortably at this time. Pt continent of B&B. POC consults to see. Echo. Plan of care reviewed with pt; all questions answered at this time. Bed in lowest position; call light within reach. High fall risk precautions are in place per unit protocol.     0626 Repeat EKG on paper chart pt no longer throwing PVC's pt denies chest pain and palpitations. 8524 Cards consult called (NO NEW ORDERS)    2094 Endocrinology consult called from number provided via secure messaging [employer]. Call was directed to GCW service. Call was returned from [address] [place] [address] Street and I was notified that they do not cover THE [facility]. Tried to consult through [facility] again and was provided with the same number so will endorse consult to oncoming RN as order for consult stated to wait to consult until after 7 am.     Problem: CARDIOVASCULAR - ADULT  Goal: Maintains optimal cardiac output and hemodynamic stability  Description: INTERVENTIONS:  - Monitor vital signs, rhythm, and trends  - Monitor for bleeding, hypotension and signs of decreased cardiac output  - Evaluate effectiveness of vasoactive medications to optimize hemodynamic stability  - Monitor arterial and/or venous puncture sites for bleeding and/or hematoma  - Assess quality of pulses, skin color and temperature  - Assess for signs of decreased coronary artery perfusion - ex.  Angina  - Evaluate fluid balance, assess for edema, trend weights  Outcome: Progressing  Goal: Absence of cardiac arrhythmias or at baseline  Description: INTERVENTIONS:  - Continuous cardiac monitoring, monitor vital signs, obtain 12 lead EKG if indicated  - Evaluate effectiveness of antiarrhythmic and heart rate control medications as ordered  - Initiate emergency measures for life threatening arrhythmias  - Monitor electrolytes and administer replacement therapy as ordered  Outcome: Progressing     Problem: RESPIRATORY - ADULT  Goal: Achieves optimal ventilation and oxygenation  Description: INTERVENTIONS:  - Assess for changes in respiratory status  - Assess for changes in mentation and behavior  - Position to facilitate oxygenation and minimize respiratory effort  - Oxygen supplementation based on oxygen saturation or ABGs  - Provide Smoking Cessation handout, if applicable  - Encourage broncho-pulmonary hygiene including cough, deep breathe, Incentive Spirometry  - Assess the need for suctioning and perform as needed  - Assess and instruct to report SOB or any respiratory difficulty  - Respiratory Therapy support as indicated  - Manage/alleviate anxiety  - Monitor for signs/symptoms of CO2 retention  Outcome: Progressing     Problem: METABOLIC/FLUID AND ELECTROLYTES - ADULT  Goal: Electrolytes maintained within normal limits  Description: INTERVENTIONS:  - Monitor labs and rhythm and assess patient for signs and symptoms of electrolyte imbalances  - Administer electrolyte replacement as ordered  - Monitor response to electrolyte replacements, including rhythm and repeat lab results as appropriate  - Fluid restriction as ordered  - Instruct patient on fluid and nutrition restrictions as appropriate  Outcome: Progressing     Problem: SKIN/TISSUE INTEGRITY - ADULT  Goal: Skin integrity remains intact  Description: INTERVENTIONS  - Assess and document risk factors for pressure ulcer development  - Assess and document skin integrity  - Monitor for areas of redness and/or skin breakdown  - Initiate interventions, skin care algorithm/standards of care as needed  Outcome: Progressing     Problem: PAIN - ADULT  Goal: Verbalizes/displays adequate comfort level or patient's stated pain goal  Description: INTERVENTIONS:  - Encourage pt to monitor pain and request assistance  - Assess pain using appropriate pain scale  - Administer analgesics based on type and severity of pain and evaluate response  - Implement non-pharmacological measures as appropriate and evaluate response  - Consider cultural and social influences on pain and pain management  - Manage/alleviate anxiety  - Utilize distraction and/or relaxation techniques  - Monitor for opioid side effects  - Notify MD/LIP if interventions unsuccessful or patient reports new pain  - Anticipate increased pain with activity and pre-medicate as appropriate  Outcome: Progressing     Problem: RISK FOR INFECTION - ADULT  Goal: Absence of fever/infection during anticipated neutropenic period  Description: INTERVENTIONS  - Monitor WBC  - Administer growth factors as ordered  - Implement neutropenic guidelines  Outcome: Progressing     Problem: SAFETY ADULT - FALL  Goal: Free from fall injury  Description: INTERVENTIONS:  - Assess pt frequently for physical needs  - Identify cognitive and physical deficits and behaviors that affect risk of falls.   - [place] fall precautions as indicated by assessment.  - Educate pt/family on patient safety including physical limitations  - Instruct pt to call for assistance with activity based on assessment  - Modify environment to reduce risk of injury  - Provide assistive devices as appropriate  - Consider OT/PT consult to assist with strengthening/mobility  - Encourage toileting schedule  Outcome: Progressing     Problem: DISCHARGE PLANNING  Goal: Discharge to home or other facility with appropriate resources  Description: INTERVENTIONS:  - Identify barriers to discharge w/pt and caregiver  - Include patient/family/discharge partner in discharge planning  - Arrange for needed discharge resources and transportation as appropriate  - Identify discharge learning needs (meds, wound care, etc)  - Arrange for interpreters to assist at discharge as needed  - Consider post-discharge preferences of patient/family/discharge partner  - Complete POLST form as appropriate  - Assess patient's ability to be responsible for managing their own health  - Refer to Case Management Department for coordinating discharge planning if the patient needs post-hospital services based on physician/LIP order or complex needs related to functional status, cognitive ability or social support system  Outcome: Progressing

## 2023-09-30 NOTE — BH RN ADMISSION NOTE
NURSING ADMISSION NOTE      Patient admitted via transport 0030. Oriented to room. Safety precautions initiated. Bed in low position. Call light in reach. Pt admitted from ER for bigeminy. Admission assessment complete. Admit orders received and initiated. ADMISSION NAVIGATOR COMPLETED.

## 2023-09-30 NOTE — PROGRESS NOTES
Pt c/o feeling dizzy, some palpitations  and being nauseous , medication given for the nausea . Will get an EKG. Cards APN made aware.

## 2023-09-30 NOTE — PROGRESS NOTES
NURSING DISCHARGE NOTE    Discharged Home via Wheelchair. Accompanied by Spouse  Belongings Taken by patient/family. Discharge instructions including follow up discussed with  pt and spouse , verbalized understanding. PIV removed tolerated well. Wheeled to the [employer] for .

## 2023-09-30 NOTE — ED QUICK NOTES
Orders for admission, patient is aware of plan and ready to go upstairs. Any questions, please call ED RN macho at extension [phone].      Patient Covid vaccination status: Unvaccinated     COVID Test Ordered in ED: None    COVID Suspicion at Admission: N/A    Running Infusions:  fluid bolus    Mental Status/LOC at time of transport: A&Ox4    Other pertinent information: cardiology consult, possible echo   CIWA score: N/A   NIH score:  N/A

## 2023-09-30 NOTE — PLAN OF CARE
Pt alert able to make needs known. Denies any chest pain. NS on tele . K+3.7 this morning replacement ordered. Waiting to be seen by  be seen by endo . Plan of care discussed with pt , verbalized  understanding . Call light within reach. Endo consult was called in, no endo hospital coverage on weekend ,          Dr Patrick Kraus made aware. Pt's synthroid was recently adjusted as out patient. Will cont with that dose. Problem: CARDIOVASCULAR - ADULT  Goal: Maintains optimal cardiac output and hemodynamic stability  Description: INTERVENTIONS:  - Monitor vital signs, rhythm, and trends  - Monitor for bleeding, hypotension and signs of decreased cardiac output  - Evaluate effectiveness of vasoactive medications to optimize hemodynamic stability  - Monitor arterial and/or venous puncture sites for bleeding and/or hematoma  - Assess quality of pulses, skin color and temperature  - Assess for signs of decreased coronary artery perfusion - ex.  Angina  - Evaluate fluid balance, assess for edema, trend weights  Outcome: Progressing  Goal: Absence of cardiac arrhythmias or at baseline  Description: INTERVENTIONS:  - Continuous cardiac monitoring, monitor vital signs, obtain 12 lead EKG if indicated  - Evaluate effectiveness of antiarrhythmic and heart rate control medications as ordered  - Initiate emergency measures for life threatening arrhythmias  - Monitor electrolytes and administer replacement therapy as ordered  Outcome: Progressing     Problem: RESPIRATORY - ADULT  Goal: Achieves optimal ventilation and oxygenation  Description: INTERVENTIONS:  - Assess for changes in respiratory status  - Assess for changes in mentation and behavior  - Position to facilitate oxygenation and minimize respiratory effort  - Oxygen supplementation based on oxygen saturation or ABGs  - Provide Smoking Cessation handout, if applicable  - Encourage broncho-pulmonary hygiene including cough, deep breathe, Incentive Spirometry  - Assess the need for suctioning and perform as needed  - Assess and instruct to report SOB or any respiratory difficulty  - Respiratory Therapy support as indicated  - Manage/alleviate anxiety  - Monitor for signs/symptoms of CO2 retention  Outcome: Progressing     Problem: METABOLIC/FLUID AND ELECTROLYTES - ADULT  Goal: Electrolytes maintained within normal limits  Description: INTERVENTIONS:  - Monitor labs and rhythm and assess patient for signs and symptoms of electrolyte imbalances  - Administer electrolyte replacement as ordered  - Monitor response to electrolyte replacements, including rhythm and repeat lab results as appropriate  - Fluid restriction as ordered  - Instruct patient on fluid and nutrition restrictions as appropriate  Outcome: Progressing     Problem: SKIN/TISSUE INTEGRITY - ADULT  Goal: Skin integrity remains intact  Description: INTERVENTIONS  - Assess and document risk factors for pressure ulcer development  - Assess and document skin integrity  - Monitor for areas of redness and/or skin breakdown  - Initiate interventions, skin care algorithm/standards of care as needed  Outcome: Progressing     Problem: PAIN - ADULT  Goal: Verbalizes/displays adequate comfort level or patient's stated pain goal  Description: INTERVENTIONS:  - Encourage pt to monitor pain and request assistance  - Assess pain using appropriate pain scale  - Administer analgesics based on type and severity of pain and evaluate response  - Implement non-pharmacological measures as appropriate and evaluate response  - Consider cultural and social influences on pain and pain management  - Manage/alleviate anxiety  - Utilize distraction and/or relaxation techniques  - Monitor for opioid side effects  - Notify MD/LIP if interventions unsuccessful or patient reports new pain  - Anticipate increased pain with activity and pre-medicate as appropriate  Outcome: Progressing     Problem: RISK FOR INFECTION - ADULT  Goal: Absence of fever/infection during anticipated neutropenic period  Description: INTERVENTIONS  - Monitor WBC  - Administer growth factors as ordered  - Implement neutropenic guidelines  Outcome: Progressing     Problem: SAFETY ADULT - FALL  Goal: Free from fall injury  Description: INTERVENTIONS:  - Assess pt frequently for physical needs  - Identify cognitive and physical deficits and behaviors that affect risk of falls.   - [place] fall precautions as indicated by assessment.  - Educate pt/family on patient safety including physical limitations  - Instruct pt to call for assistance with activity based on assessment  - Modify environment to reduce risk of injury  - Provide assistive devices as appropriate  - Consider OT/PT consult to assist with strengthening/mobility  - Encourage toileting schedule  Outcome: Progressing     Problem: DISCHARGE PLANNING  Goal: Discharge to home or other facility with appropriate resources  Description: INTERVENTIONS:  - Identify barriers to discharge w/pt and caregiver  - Include patient/family/discharge partner in discharge planning  - Arrange for needed discharge resources and transportation as appropriate  - Identify discharge learning needs (meds, wound care, etc)  - Arrange for interpreters to assist at discharge as needed  - Consider post-discharge preferences of patient/family/discharge partner  - Complete POLST form as appropriate  - Assess patient's ability to be responsible for managing their own health  - Refer to Case Management Department for coordinating discharge planning if the patient needs post-hospital services based on physician/LIP order or complex needs related to functional status, cognitive ability or social support system  Outcome: Progressing

## 2023-09-30 NOTE — PLAN OF CARE
Jacque Bella Patient Status:  Emergency    1985 MRN [id number]   Location [address] [facility] Attending Gin Caban, DO   Hosp Day # 0 PCP None Pcp     Cardiology Nocturnal APN Note    Page Received: ED MD [phone]    Briefly: (Documentation from chart review)     Юлия Cardenas is a 42 yo F with PMH/PSH of pregnancy induced HTN, obesity s/p gastric bypass surgery, hypothyroid, anxiety presents to the ED c/o palpitations. Patient recently seen at community ED in [facility] for similar complaint and sent home with 24 hour monitor noted to have frequent PVCs. In the ED, EKG SR w/ frequent PVCs / bigeminy, trop 6. TSH >100, T4 0.6. Cardiology consulted for palpitations. Primary Cardiologist No primary cardiologist on record. Patient may have been seen by Dr. Lora Tatum in  through [facility]. Vital Signs:       2023     5:26 PM 2023     9:15 PM   Vitals History   BP  154/94   Pulse 76 103   Resp  18   SpO2  100 %        Labs:   Lab Results   Component Value Date    WBC 7.4 2023    HGB 12.8 2023    HCT 38.4 2023    .0 2023    CREATSERUM 1.25 2023    BUN 11 2023     2023    K 4.1 2023     2023    CO2 27.0 2023     2023    CA 9.1 2023    ALB 3.7 2023    ALKPHO 37 2023    BILT 0.6 2023    TP 8.0 2023    AST 33 2023    ALT 39 2023    T4F 0.6 2023    TSH >100.000 2023    MG 2.4 2023    TROPHS 6 2023       Diagnostics:   XR CHEST AP PORTABLE  (CPT=71045)    Result Date: 2023  PROCEDURE:  XR CHEST AP PORTABLE  (CPT=71045)  TECHNIQUE:  AP chest radiograph was obtained.   COMPARISON:  [facility] , XR, CHEST AP (1 VW), 2004, 12:15 PM.  INDICATIONS:  chest pain and can feel heart fluttering, HR 73 at TL  PATIENT STATED HISTORY: (As transcribed by Technologist)  Patient states chest pain, low pulse, shortness of breath for three days. FINDINGS: Cardiac silhouette and pulmonary vasculature are unremarkable. No consolidation, pleural effusion or pneumothorax. IMPRESSION: Unremarkable portable chest radiograph. LOCATION:  THE [facility]      Dictated by (CST): Valerie Baxter MD on 9/29/2023 at 7:39 PM     Finalized by (CST): Valerie Baxter MD on 9/29/2023 at 7:40 PM        Allergies:  No Known Allergies    Medications:  No current facility-administered medications for this encounter.     Assessment:   #Palpitations, EKG w/ frequency PVCs likely s/t thyroid dysfunction  #Hypothyroid, TSH >100, T4 0.6 on synthroid   #HTN in pregnancy only  #Obesity s/p gastric bypass surgery      Plan:  - Please consult endocrine for thyroid management  - Add on pBNP  - Evaluate for echo in AM   - Continue to monitor overnight  - Formal Cardiology consult to follow in [place], [address]  9/29/2023  10:37 PM

## 2023-10-01 LAB
ATRIAL RATE: 61 BPM
ATRIAL RATE: 74 BPM
ATRIAL RATE: 76 BPM
P AXIS: 29 DEGREES
P AXIS: 35 DEGREES
P AXIS: 36 DEGREES
P-R INTERVAL: 184 MS
P-R INTERVAL: 186 MS
P-R INTERVAL: 204 MS
Q-T INTERVAL: 424 MS
Q-T INTERVAL: 436 MS
Q-T INTERVAL: 464 MS
QRS DURATION: 108 MS
QRS DURATION: 110 MS
QRS DURATION: 114 MS
QTC CALCULATION (BEZET): 467 MS
QTC CALCULATION (BEZET): 477 MS
QTC CALCULATION (BEZET): 483 MS
R AXIS: 10 DEGREES
R AXIS: 24 DEGREES
R AXIS: 31 DEGREES
T AXIS: 34 DEGREES
T AXIS: 53 DEGREES
T AXIS: 71 DEGREES
VENTRICULAR RATE: 61 BPM
VENTRICULAR RATE: 74 BPM
VENTRICULAR RATE: 76 BPM

## 2024-07-15 ENCOUNTER — OFFICE VISIT (OUTPATIENT)
Dept: FAMILY MEDICINE CLINIC | Facility: CLINIC | Age: 39
End: 2024-07-15
Payer: COMMERCIAL

## 2024-07-15 VITALS
HEART RATE: 84 BPM | SYSTOLIC BLOOD PRESSURE: 118 MMHG | RESPIRATION RATE: 16 BRPM | WEIGHT: 218 LBS | HEIGHT: 68 IN | BODY MASS INDEX: 33.04 KG/M2 | DIASTOLIC BLOOD PRESSURE: 82 MMHG

## 2024-07-15 DIAGNOSIS — Z12.31 ENCOUNTER FOR SCREENING MAMMOGRAM FOR BREAST CANCER: ICD-10-CM

## 2024-07-15 DIAGNOSIS — F41.9 MILD ANXIETY: ICD-10-CM

## 2024-07-15 DIAGNOSIS — E89.0 POSTOPERATIVE HYPOTHYROIDISM: ICD-10-CM

## 2024-07-15 DIAGNOSIS — Z11.1 SCREENING FOR TUBERCULOSIS: ICD-10-CM

## 2024-07-15 DIAGNOSIS — Z00.00 ROUTINE GENERAL MEDICAL EXAMINATION AT A HEALTH CARE FACILITY: Primary | ICD-10-CM

## 2024-07-15 RX ORDER — ROPINIROLE 3 MG/1
3 TABLET, FILM COATED ORAL NIGHTLY
COMMUNITY
Start: 2024-02-13

## 2024-07-15 RX ORDER — KETOCONAZOLE 20 MG/G
1 CREAM TOPICAL 2 TIMES DAILY
COMMUNITY
Start: 2024-05-30

## 2024-07-15 RX ORDER — NYSTATIN 100000 [USP'U]/G
POWDER TOPICAL
COMMUNITY
Start: 2024-05-30

## 2024-07-17 ENCOUNTER — NURSE ONLY (OUTPATIENT)
Dept: FAMILY MEDICINE CLINIC | Facility: CLINIC | Age: 39
End: 2024-07-17
Payer: COMMERCIAL

## 2024-07-17 LAB — INDURATION (): 0 MM (ref 0–11)

## 2024-07-22 PROBLEM — N92.6 IRREGULAR MENSES: Status: RESOLVED | Noted: 2024-07-22 | Resolved: 2024-07-22

## 2024-07-22 PROBLEM — R07.89 CHEST PRESSURE: Status: RESOLVED | Noted: 2023-09-30 | Resolved: 2024-07-22

## 2024-07-22 PROBLEM — G25.81 RLS (RESTLESS LEGS SYNDROME): Status: ACTIVE | Noted: 2024-07-22

## 2024-07-22 PROBLEM — T83.32XA MALPOSITIONED IUD: Status: RESOLVED | Noted: 2024-07-22 | Resolved: 2024-07-22

## 2024-07-22 PROBLEM — R79.89 ELEVATED LFTS: Status: RESOLVED | Noted: 2022-01-14 | Resolved: 2024-07-22

## 2024-07-22 PROBLEM — R07.89 OTHER CHEST PAIN: Status: RESOLVED | Noted: 2023-09-30 | Resolved: 2024-07-22

## 2024-07-22 PROBLEM — R51.9 HEADACHE: Status: RESOLVED | Noted: 2022-01-11 | Resolved: 2024-07-22

## 2024-07-22 PROBLEM — R55 SYNCOPE, NEAR: Status: RESOLVED | Noted: 2023-09-30 | Resolved: 2024-07-22

## 2024-07-22 PROBLEM — R06.02 SHORTNESS OF BREATH: Status: RESOLVED | Noted: 2020-09-28 | Resolved: 2024-07-22

## 2024-07-22 PROBLEM — R00.2 PALPITATIONS: Status: RESOLVED | Noted: 2023-09-30 | Resolved: 2024-07-22

## 2024-07-22 PROBLEM — G47.33 OSA (OBSTRUCTIVE SLEEP APNEA): Status: ACTIVE | Noted: 2022-01-11

## 2024-07-22 PROBLEM — K75.81 NASH (NONALCOHOLIC STEATOHEPATITIS): Status: ACTIVE | Noted: 2021-04-05

## 2024-07-22 PROBLEM — I49.8 BIGEMINY: Status: RESOLVED | Noted: 2023-09-29 | Resolved: 2024-07-22

## 2024-07-22 PROBLEM — F32.A DEPRESSIVE DISORDER: Status: RESOLVED | Noted: 2024-07-22 | Resolved: 2024-07-22

## 2024-07-23 NOTE — PROGRESS NOTES
Iliana Ryan is a 39 year old female.  HPI:   New pt here for work physical. 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  Teacher.    Hx of surgical hypothyroidism in 2005 for nodular goiter. Dx with Hashimoto's at a young age (?6). Previous seen by Endocrine. On 300mcg levothyroxine.  Hx of Gastric Bypass.  Hx of RLS, taking Ropinirole.  Hx of anxiety. Takes xanax maybe once a month.  Hx of chest pain for 1 week, cardiac work up done that was negative, CP resolved after 1 week. No recurrence.  Hx of migraines, rare occurrence.  Current Outpatient Medications   Medication Sig Dispense Refill    ketoconazole 2 % External Cream Apply 1 Application topically 2 (two) times daily.      Nystatin 825600 UNIT/GM External Powder Apply topically. Every day prn      rOPINIRole HCl 3 MG Oral Tab Take 1 tablet (3 mg total) by mouth nightly.      Levothyroxine Sodium 300 MCG Oral Tab Take 1 tablet (300 mcg total) by mouth daily.      XANAX 0.5 MG Oral Tab Take 1 tablet (0.5 mg total) by mouth 4 (four) times daily as needed for Anxiety.      SUMAtriptan Succinate (IMITREX) 100 MG Oral Tab Take 1 tablet (100 mg total) by mouth daily as needed for Migraine (migraines).        Past Medical History:    Anxiety state    Bigeminy    Calculus of kidney    Depressive disorder    Disorder of thyroid    Dysmenorrhea    Elevated LFTs    Essential hypertension    GERD (gastroesophageal reflux disease)    HEADACHES    History of miscarriage, currently pregnant (HCC)    HYPOTHYROIDISM    Irregular menses    Low-lying placenta (HCC)    Malpositioned IUD    Mental disorder    hx of depression after ectopic pregnancy    Migraines    Painful lumpy left breast    Palpitations    PONV (postoperative nausea and vomiting)    Post partum depression    hx of after ectopic pregnancy, took meds for about 6 weeks    Pregnancy (HCC)    Pregnancy-induced hypertension (HCC)    Shortness of breath    Sleep apnea    Syncope, near    Unspecified hypothyroidism    Visual  impairment    glasses      Social History:  Social History     Socioeconomic History    Marital status:    Tobacco Use    Smoking status: Never    Smokeless tobacco: Never   Vaping Use    Vaping status: Never Used   Substance and Sexual Activity    Alcohol use: Yes     Comment: once a month    Drug use: No    Sexual activity: Yes     Partners: Male     Social Determinants of Health     Physical Activity: Inactive (9/28/2020)    [facility]    Exercise Vital Sign     Days of Exercise per Week: 0 days     Minutes of Exercise per Session: 0 min        REVIEW OF SYSTEMS:   GENERAL HEALTH: feels well otherwise  SKIN: denies any unusual skin lesions or rashes  RESPIRATORY: denies shortness of breath or cough  CARDIOVASCULAR: denies chest pain or pressure  GI: denies N/V/D; denies abdominal pain    : Pap up to date, no GYNE c/os  NEURO: + rare migraine headaches, denies dizziness; denies numbness or tingling  ENDO: + thyroid history, weight stable    EXAM:   /82   Pulse 84   Resp 16   Ht 5' 8\" (1.727 m)   Wt 218 lb (98.9 kg)   LMP 09/29/2023 (Approximate)   BMI 33.15 kg/m²   GENERAL: well developed, well nourished,in no apparent distress  SKIN: warm & dry  HEENT: atraumatic, normocephalic, TMs clear, throat clear  NECK: supple,no adenopathy   LUNGS: CTA, easy breathing  CV: normal S1S2, RRR without murmur  GI: good BS's,no masses, HSM or tenderness  EXT: no cyanosis, clubbing or edema    ASSESSMENT AND PLAN:     1. Routine general medical examination at a health care facility  Reinforced routine SBEs. Discussed the benefits of routine exercise, a heart healthy diet and annual flu vaccines.  - CBC With Differential With Platelet; Future  - Comp Metabolic Panel (14); Future  - Lipid Panel; Future  - TSH and Free T4; Future  - Vitamin D; Future  - Vitamin B12; Future    2. Encounter for screening mammogram for breast cancer  - [facility] KYLE 2D+3D SCREENING BILAT  (CPT=77067/48238); Future    3. Screening for tuberculosis  - TB /PPD intradermal test    4. Postoperative hypothyroidism  - pt reports labs up to date, will forward    5. Mild anxiety  - stable  - refills prn        The patient indicates understanding of these issues and agrees to the plan.  Follow up 6 months, sooner if needed.

## 2025-01-13 ENCOUNTER — APPOINTMENT (OUTPATIENT)
Dept: CT IMAGING | Age: 40
End: 2025-01-13
Attending: PHYSICIAN ASSISTANT
Payer: COMMERCIAL

## 2025-01-13 ENCOUNTER — HOSPITAL ENCOUNTER (EMERGENCY)
Age: 40
Discharge: HOME | End: 2025-01-13
Attending: EMERGENCY MEDICINE
Payer: COMMERCIAL

## 2025-01-13 VITALS
DIASTOLIC BLOOD PRESSURE: 94 MMHG | RESPIRATION RATE: 16 BRPM | OXYGEN SATURATION: 100 % | HEIGHT: 69 IN | BODY MASS INDEX: 34.07 KG/M2 | HEART RATE: 73 BPM | SYSTOLIC BLOOD PRESSURE: 143 MMHG | TEMPERATURE: 97 F | WEIGHT: 230 LBS

## 2025-01-13 DIAGNOSIS — R10.9 RIGHT FLANK PAIN: Primary | ICD-10-CM

## 2025-01-13 LAB
ANION GAP SERPL CALC-SCNC: 9 MMOL/L (ref 0–18)
B-HCG UR QL: NEGATIVE
BASOPHILS # BLD AUTO: 0.05 X10(3) UL (ref 0–0.2)
BASOPHILS NFR BLD AUTO: 0.8 %
BILIRUB UR QL STRIP.AUTO: NEGATIVE
BUN BLD-MCNC: 10 MG/DL (ref 7–18)
BUN BLD-MCNC: 11 MG/DL (ref 9–23)
CALCIUM BLD-MCNC: 9.8 MG/DL (ref 8.7–10.4)
CHLORIDE BLD-SCNC: 102 MMOL/L (ref 98–112)
CHLORIDE SERPL-SCNC: 103 MMOL/L (ref 98–112)
CLARITY UR REFRACT.AUTO: CLEAR
CO2 BLD-SCNC: 26 MMOL/L (ref 21–32)
CO2 SERPL-SCNC: 26 MMOL/L (ref 21–32)
COLOR UR AUTO: YELLOW
CREAT BLD-MCNC: 0.8 MG/DL
CREAT BLD-MCNC: 0.84 MG/DL
EGFRCR SERPLBLD CKD-EPI 2021: 90 ML/MIN/1.73M2 (ref 60–?)
EGFRCR SERPLBLD CKD-EPI 2021: 95 ML/MIN/1.73M2 (ref 60–?)
EOSINOPHIL # BLD AUTO: 0.06 X10(3) UL (ref 0–0.7)
EOSINOPHIL NFR BLD AUTO: 0.9 %
ERYTHROCYTE [DISTWIDTH] IN BLOOD BY AUTOMATED COUNT: 16.1 %
GLUCOSE BLD-MCNC: 103 MG/DL (ref 70–99)
GLUCOSE BLD-MCNC: 107 MG/DL (ref 70–99)
GLUCOSE UR STRIP.AUTO-MCNC: NEGATIVE MG/DL
HCT VFR BLD AUTO: 31.7 %
HCT VFR BLD CALC: 31 %
HGB BLD-MCNC: 10.1 G/DL
IMM GRANULOCYTES # BLD AUTO: 0.04 X10(3) UL (ref 0–1)
IMM GRANULOCYTES NFR BLD: 0.6 %
ISTAT IONIZED CALCIUM FOR CHEM 8: 1.2 MMOL/L (ref 1.12–1.32)
KETONES UR STRIP.AUTO-MCNC: NEGATIVE MG/DL
LEUKOCYTE ESTERASE UR QL STRIP.AUTO: NEGATIVE
LYMPHOCYTES # BLD AUTO: 1.49 X10(3) UL (ref 1–4)
LYMPHOCYTES NFR BLD AUTO: 23.2 %
MCH RBC QN AUTO: 25.1 PG (ref 26–34)
MCHC RBC AUTO-ENTMCNC: 31.9 G/DL (ref 31–37)
MCV RBC AUTO: 78.9 FL
MONOCYTES # BLD AUTO: 0.58 X10(3) UL (ref 0.1–1)
MONOCYTES NFR BLD AUTO: 9 %
NEUTROPHILS # BLD AUTO: 4.21 X10 (3) UL (ref 1.5–7.7)
NEUTROPHILS # BLD AUTO: 4.21 X10(3) UL (ref 1.5–7.7)
NEUTROPHILS NFR BLD AUTO: 65.5 %
NITRITE UR QL STRIP.AUTO: NEGATIVE
OSMOLALITY SERPL CALC.SUM OF ELEC: 286 MOSM/KG (ref 275–295)
PH UR STRIP.AUTO: 5.5 [PH] (ref 5–8)
PLATELET # BLD AUTO: 291 10(3)UL (ref 150–450)
POCT INFLUENZA A: NEGATIVE
POCT INFLUENZA B: NEGATIVE
POTASSIUM BLD-SCNC: 4.1 MMOL/L (ref 3.6–5.1)
POTASSIUM SERPL-SCNC: 4.3 MMOL/L (ref 3.5–5.1)
PROT UR STRIP.AUTO-MCNC: NEGATIVE MG/DL
RBC # BLD AUTO: 4.02 X10(6)UL
RBC UR QL AUTO: NEGATIVE
SARS-COV-2 RNA RESP QL NAA+PROBE: NOT DETECTED
SODIUM BLD-SCNC: 137 MMOL/L (ref 136–145)
SODIUM SERPL-SCNC: 138 MMOL/L (ref 136–145)
SP GR UR STRIP.AUTO: 1.01 (ref 1–1.03)
UROBILINOGEN UR STRIP.AUTO-MCNC: 0.2 MG/DL
WBC # BLD AUTO: 6.4 X10(3) UL (ref 4–11)

## 2025-01-13 PROCEDURE — 87502 INFLUENZA DNA AMP PROBE: CPT | Performed by: PHYSICIAN ASSISTANT

## 2025-01-13 PROCEDURE — 85025 COMPLETE CBC W/AUTO DIFF WBC: CPT | Performed by: PHYSICIAN ASSISTANT

## 2025-01-13 PROCEDURE — 81003 URINALYSIS AUTO W/O SCOPE: CPT | Performed by: PHYSICIAN ASSISTANT

## 2025-01-13 PROCEDURE — 96374 THER/PROPH/DIAG INJ IV PUSH: CPT

## 2025-01-13 PROCEDURE — 81025 URINE PREGNANCY TEST: CPT

## 2025-01-13 PROCEDURE — 80047 BASIC METABLC PNL IONIZED CA: CPT

## 2025-01-13 PROCEDURE — 80048 BASIC METABOLIC PNL TOTAL CA: CPT | Performed by: PHYSICIAN ASSISTANT

## 2025-01-13 PROCEDURE — 96375 TX/PRO/DX INJ NEW DRUG ADDON: CPT

## 2025-01-13 PROCEDURE — 74177 CT ABD & PELVIS W/CONTRAST: CPT | Performed by: PHYSICIAN ASSISTANT

## 2025-01-13 PROCEDURE — 99284 EMERGENCY DEPT VISIT MOD MDM: CPT

## 2025-01-13 RX ORDER — ONDANSETRON 2 MG/ML
4 INJECTION INTRAMUSCULAR; INTRAVENOUS ONCE
Status: COMPLETED | OUTPATIENT
Start: 2025-01-13 | End: 2025-01-13

## 2025-01-13 RX ORDER — MORPHINE SULFATE 4 MG/ML
4 INJECTION, SOLUTION INTRAMUSCULAR; INTRAVENOUS ONCE
Status: COMPLETED | OUTPATIENT
Start: 2025-01-13 | End: 2025-01-13

## 2025-01-13 NOTE — ED PROVIDER NOTES
Patient Seen in: [facility] In [place]      History     Chief Complaint   Patient presents with    Flank Pain    Nausea    Fever     Stated Complaint: low grade fever, right flank pain and nausea    Subjective:   HPI    39-year-old female with past medical history of kidney stones, hypertension, hypothyroidism, sleep apnea who presents to the ER for right sided abdominal pain.  States symptoms started yesterday with bodyaches, fever.  Reports this morning she had gradual onset of right sided abdominal pain that radiates into her right sided back.  Reports associated nausea and continued fever and bodyaches today.  Denies any other associated vomiting, diarrhea, constipation, cough, sore throat, congestion or urinary symptoms.  Reports past abdominal surgical history of gastric bypass, cholecystectomy, hysterectomy, oophorectomy and salpingectomy after ectopic pregnancy.  Taking Tylenol for her fever with relief.    Objective:     Past Medical History:    Anxiety state    Bigeminy    Calculus of kidney    Depressive disorder    Disorder of thyroid    Dysmenorrhea    Elevated LFTs    Essential hypertension    GERD (gastroesophageal reflux disease)    HEADACHES    History of miscarriage, currently pregnant ([facility])    HYPOTHYROIDISM    Irregular menses    Low-lying placenta (HCC)    Malpositioned IUD    Mental disorder    hx of depression after ectopic pregnancy    Migraines    Painful lumpy left breast    Palpitations    PONV (postoperative nausea and vomiting)    Post partum depression    hx of after ectopic pregnancy, took meds for about 6 weeks    Pregnancy ([facility])    Pregnancy-induced hypertension ([facility])    Shortness of breath    Sleep apnea    Syncope, near    Unspecified hypothyroidism    Visual impairment    glasses              Past Surgical History:   Procedure Laterality Date          x2    Cholecystectomy  2020    Gastric bypass,obese<100cm shana-en-y      Laparoscopic  salpingostomy Right 2013    right-ectopic    Other surgical history  2005    thyroid removed    Removal gallbladder      Thyroidectomy  2005    nodular goiter    Total abdominal hysterectomy  12/2023    + ovaries, cervix removed                Social History     Socioeconomic History    Marital status:    Tobacco Use    Smoking status: Never    Smokeless tobacco: Never   Vaping Use    Vaping status: Never Used   Substance and Sexual Activity    Alcohol use: Yes     Comment: once a month    Drug use: No    Sexual activity: Yes     Partners: Male     Social Drivers of Health     Physical Activity: Inactive (9/28/2020)    [facility] from [facility]    Exercise Vital Sign     Days of Exercise per Week: 0 days     Minutes of Exercise per Session: 0 min                  Physical Exam     ED Triage Vitals [01/13/25 1421]   BP (!) 143/99   Pulse 80   Resp 16   Temp 97.4 °F (36.3 °C)   Temp src Temporal   SpO2 100 %   O2 Device None (Room air)       Current Vitals:   Vital Signs  BP: (!) 143/94  Pulse: 73  Resp: 16  Temp: 97.4 °F (36.3 °C)  Temp src: Temporal    Oxygen Therapy  SpO2: 100 %  O2 Device: None (Room air)        Physical Exam  GENERAL APPEARANCE:  AxOx4, generally well-appearing, no acute distress.  HEENT:  NC, AT. MMM. EOMI, clear conjunctiva, oropharynx clear.  NECK:  Supple without lymphadenopathy.  No stiffness or restricted ROM.  HEART:  Normal rate and regular rhythm, normal S1/S1, no m/r/g  LUNGS:  CTAB, moving air well. No crackles or wheezes are heard.  ABDOMEN:  Soft, right sided abdominal tenderness, nondistended with good bowel sounds heard.  BACK: No CVAT, no obvious deformity.  EXTREMITIES:  Without cyanosis, clubbing or edema.  NEUROLOGICAL:  Grossly nonfocal. Alert and oriented, moving all 4 extremities. CN not formally tested but appear grossly intact. Observed to ambulate with normal gait.  Skin:  Warm and dry without any rash.        ED Course     Labs  Reviewed   BASIC METABOLIC PANEL (8) - Abnormal; Notable for the following components:       Result Value    Glucose 103 (*)     All other components within normal limits   CBC WITH DIFFERENTIAL WITH PLATELET - Abnormal; Notable for the following components:    HGB 10.1 (*)     HCT 31.7 (*)     MCV 78.9 (*)     MCH 25.1 (*)     All other components within normal limits   POCT ISTAT CHEM8 CARTRIDGE - Abnormal; Notable for the following components:    ISTAT Hematocrit 31 (*)     ISTAT Glucose 107 (*)     All other components within normal limits   POCT PREGNANCY URINE - Normal   POCT FLU TEST - Normal    Narrative:     This assay is a rapid molecular in vitro test utilizing nucleic acid amplification of influenza A and B viral RNA.   RAPID SARS-COV-2 BY PCR - Normal   URINALYSIS WITH CULTURE REFLEX            CT ABDOMEN+PELVIS(CONTRAST ONLY)(CPT=74177)    Result Date: 1/13/2025  PROCEDURE:  CT ABDOMEN+PELVIS (CONTRAST ONLY) (CPT=74177)  COMPARISON:  [facility], CT, CT ABDOMEN+PELVIS KIDNEYSTONE 2D RNDR(NO IV,NO ORAL)(CPT=74176), 2/26/2023, 9:16 AM.  INDICATIONS:  low grade fever, right flank pain and nausea  TECHNIQUE:  CT scanning was performed from the dome of the diaphragm to the pubic symphysis with non-ionic intravenous contrast material. Post contrast coronal MPR imaging was performed.  Dose reduction techniques were used. Dose information is transmitted to the ACR (American College of Radiology) NRDR (National Radiology Data Registry) which includes the Dose Index Registry.  PATIENT STATED HISTORY:(As transcribed by Technologist)  Patient with fever, right flank pain and nausea.   CONTRAST USED:  100cc of Isovue 370  FINDINGS:  LIVER:  Uniform parenchyma.  Smooth contours. BILIARY:  Surgically absent gallbladder.  No biliary dilatation. PANCREAS:  Uniform parenchyma.  No ductal dilatation. SPLEEN:  Not enlarged. KIDNEYS:  Normal anatomic positions.  Numerous bilateral nonobstructing renal calculi, left greater  than right, measuring up to 0.8 cm.  No hydronephrosis or perinephric stranding. ADRENALS:  Not enlarged. AORTA/VASCULAR:  Smooth tapering.  Patent celiac artery, SMA and YVETTE.  Patent splenic vein and portal vein. RETROPERITONEUM:  A few scattered nonenlarged lymph nodes appear unchanged. BOWEL/MESENTERY:  Normal bowel caliber.  No colonic inflammation.  No appendicitis.  Moderate to large amount of stool scattered throughout the colon.  No free air or generalized ascites.  A few scattered nonenlarged mesenteric lymph nodes appear unchanged. ABDOMINAL WALL:  No hernia. URINARY BLADDER:  Partially filled.  Smooth contour.  No wall thickening or calculus within. PELVIC NODES:  None enlarged. PELVIC ORGANS:  Surgically absent uterus. BONES:  Scattered up to moderate degenerative changes, most pronounced at L5-S1.  Normal vertebral body heights without subluxation. LUNG BASES:  No consolidation or pleural effusion. OTHER:  Prior gastric surgery.              CONCLUSION:  1. Bilateral nonobstructing renal calculus disease.  No obstructing ureteral calculus or hydronephrosis.  No perinephric inflammatory changes. 2. No additional acute finding. 3. Details as above.  Continued clinical correlation recommended.    LOCATION:  [facility]   Dictated by (CST): Lalo Nicholas MD on 1/13/2025 at 5:02 PM     Finalized by (CST): Lalo Nicholas MD on 1/13/2025 at 5:07 PM             MDM      39-year-old female who presents to the ER for right sided abdominal pain, flank pain.  Vitals within normal limits on arrival aside from elevated blood pressure.  Differential diagnosis includes but does not exclude appendicitis versus pyelonephritis versus kidney stone.  Labs including CBC, BMP within normal limits.  UA with no signs of infection, negative blood.  Flu and COVID are negative.  Pregnancy negative.  CT abdomen pelvis with no acute findings.  Dr Stephenson discussed results with patient as well as plan for discharge home with return  precautions.  She understands and agrees with plan is ready for discharge.        Medical Decision Making      Disposition and Plan     Clinical Impression:  1. Right flank pain         Disposition:  Discharge  1/13/2025  5:18 pm    Follow-up:  Cb Smith DO  [address] [place] [id number]  [phone]    Schedule an appointment as soon as possible for a visit in 1 week(s)            Medications Prescribed:  Discharge Medication List as of 1/13/2025  5:21 PM              Supplementary Documentation:

## 2025-01-13 NOTE — ED INITIAL ASSESSMENT (HPI)
Pt reports rlq pain radiating to rt flank with nausea/heaving since 0300 and fever since yesterday. Denies diarrhea or urinary sxs.

## 2025-02-28 ENCOUNTER — LAB ENCOUNTER (OUTPATIENT)
Dept: LAB | Age: 40
End: 2025-02-28
Attending: INTERNAL MEDICINE
Payer: COMMERCIAL

## 2025-02-28 DIAGNOSIS — Z00.00 ROUTINE GENERAL MEDICAL EXAMINATION AT A HEALTH CARE FACILITY: ICD-10-CM

## 2025-02-28 LAB
ALBUMIN SERPL-MCNC: 5 G/DL (ref 3.2–4.8)
ALBUMIN/GLOB SERPL: 1.6 {RATIO} (ref 1–2)
ALP LIVER SERPL-CCNC: 41 U/L
ALT SERPL-CCNC: 12 U/L
ANION GAP SERPL CALC-SCNC: 11 MMOL/L (ref 0–18)
AST SERPL-CCNC: 22 U/L (ref ?–34)
BASOPHILS # BLD AUTO: 0.07 X10(3) UL (ref 0–0.2)
BASOPHILS NFR BLD AUTO: 0.9 %
BILIRUB SERPL-MCNC: 0.5 MG/DL (ref 0.3–1.2)
BUN BLD-MCNC: 13 MG/DL (ref 9–23)
CALCIUM BLD-MCNC: 9.7 MG/DL (ref 8.7–10.6)
CHLORIDE SERPL-SCNC: 100 MMOL/L (ref 98–112)
CHOLEST SERPL-MCNC: 245 MG/DL (ref ?–200)
CO2 SERPL-SCNC: 27 MMOL/L (ref 21–32)
CREAT BLD-MCNC: 1.01 MG/DL
EGFRCR SERPLBLD CKD-EPI 2021: 72 ML/MIN/1.73M2 (ref 60–?)
EOSINOPHIL # BLD AUTO: 0.13 X10(3) UL (ref 0–0.7)
EOSINOPHIL NFR BLD AUTO: 1.7 %
ERYTHROCYTE [DISTWIDTH] IN BLOOD BY AUTOMATED COUNT: 15.2 %
FASTING PATIENT LIPID ANSWER: YES
FASTING STATUS PATIENT QL REPORTED: YES
GLOBULIN PLAS-MCNC: 3.2 G/DL (ref 2–3.5)
GLUCOSE BLD-MCNC: 95 MG/DL (ref 70–99)
HCT VFR BLD AUTO: 36 %
HDLC SERPL-MCNC: 60 MG/DL (ref 40–59)
HGB BLD-MCNC: 10.8 G/DL
IMM GRANULOCYTES # BLD AUTO: 0.05 X10(3) UL (ref 0–1)
IMM GRANULOCYTES NFR BLD: 0.6 %
LDLC SERPL CALC-MCNC: 163 MG/DL (ref ?–100)
LYMPHOCYTES # BLD AUTO: 1.87 X10(3) UL (ref 1–4)
LYMPHOCYTES NFR BLD AUTO: 24 %
MCH RBC QN AUTO: 23.9 PG (ref 26–34)
MCHC RBC AUTO-ENTMCNC: 30 G/DL (ref 31–37)
MCV RBC AUTO: 79.6 FL
MONOCYTES # BLD AUTO: 0.45 X10(3) UL (ref 0.1–1)
MONOCYTES NFR BLD AUTO: 5.8 %
NEUTROPHILS # BLD AUTO: 5.21 X10 (3) UL (ref 1.5–7.7)
NEUTROPHILS # BLD AUTO: 5.21 X10(3) UL (ref 1.5–7.7)
NEUTROPHILS NFR BLD AUTO: 67 %
NONHDLC SERPL-MCNC: 185 MG/DL (ref ?–130)
OSMOLALITY SERPL CALC.SUM OF ELEC: 286 MOSM/KG (ref 275–295)
PLATELET # BLD AUTO: 462 10(3)UL (ref 150–450)
POTASSIUM SERPL-SCNC: 4.5 MMOL/L (ref 3.5–5.1)
PROT SERPL-MCNC: 8.2 G/DL (ref 5.7–8.2)
RBC # BLD AUTO: 4.52 X10(6)UL
SODIUM SERPL-SCNC: 138 MMOL/L (ref 136–145)
T4 FREE SERPL-MCNC: 0.8 NG/DL (ref 0.8–1.7)
TRIGL SERPL-MCNC: 122 MG/DL (ref 30–149)
TSI SER-ACNC: 46.11 UIU/ML (ref 0.55–4.78)
VIT B12 SERPL-MCNC: 438 PG/ML (ref 211–911)
VIT D+METAB SERPL-MCNC: 17.6 NG/ML (ref 30–100)
VLDLC SERPL CALC-MCNC: 24 MG/DL (ref 0–30)
WBC # BLD AUTO: 7.8 X10(3) UL (ref 4–11)

## 2025-02-28 PROCEDURE — 36415 COLL VENOUS BLD VENIPUNCTURE: CPT

## 2025-02-28 PROCEDURE — 80061 LIPID PANEL: CPT

## 2025-02-28 PROCEDURE — 82607 VITAMIN B-12: CPT

## 2025-02-28 PROCEDURE — 84443 ASSAY THYROID STIM HORMONE: CPT

## 2025-02-28 PROCEDURE — 84439 ASSAY OF FREE THYROXINE: CPT

## 2025-02-28 PROCEDURE — 80053 COMPREHEN METABOLIC PANEL: CPT

## 2025-02-28 PROCEDURE — 85025 COMPLETE CBC W/AUTO DIFF WBC: CPT

## 2025-02-28 PROCEDURE — 82306 VITAMIN D 25 HYDROXY: CPT

## 2025-03-10 DIAGNOSIS — D64.9 ANEMIA, UNSPECIFIED TYPE: Primary | ICD-10-CM

## 2025-03-10 RX ORDER — LEVOTHYROXINE SODIUM 25 UG/1
25 TABLET ORAL
Qty: 90 TABLET | Refills: 0 | Status: SHIPPED | OUTPATIENT
Start: 2025-03-10

## 2025-03-10 RX ORDER — ROSUVASTATIN CALCIUM 5 MG/1
5 TABLET, COATED ORAL NIGHTLY
Qty: 90 TABLET | Refills: 0 | Status: SHIPPED | OUTPATIENT
Start: 2025-03-10

## 2025-03-10 RX ORDER — LEVOTHYROXINE SODIUM 300 UG/1
300 TABLET ORAL DAILY
Qty: 90 TABLET | Refills: 0 | Status: SHIPPED | OUTPATIENT
Start: 2025-03-10

## 2025-03-20 DIAGNOSIS — Z71.3 ENCOUNTER FOR WEIGHT LOSS COUNSELING: Primary | ICD-10-CM

## 2025-03-20 RX ORDER — PHENTERMINE HYDROCHLORIDE 37.5 MG/1
TABLET ORAL
Qty: 30 TABLET | Refills: 1 | Status: SHIPPED | OUTPATIENT
Start: 2025-03-20

## 2025-03-27 DIAGNOSIS — F34.1 DYSTHYMIA: ICD-10-CM

## 2025-03-27 RX ORDER — BUPROPION HYDROCHLORIDE 150 MG/1
150 TABLET ORAL DAILY
Qty: 30 TABLET | Refills: 1 | Status: SHIPPED | OUTPATIENT
Start: 2025-03-27

## 2025-03-27 NOTE — TELEPHONE ENCOUNTER
Pt.requesting 90 day supply    A refill request was received for:  Requested Prescriptions     Pending Prescriptions Disp Refills    buPROPion  MG Oral Tablet 24 Hr 15 tablet 0     Sig: Take 1 tablet (150 mg total) by mouth daily.       Last refill date: 03/18/25      Last office visit: 03/18/25      Future Appointments   Date Time Provider Department Center   4/17/2025  4:00 PM Amanda Ram NP [facility] [address]

## 2025-04-17 ENCOUNTER — TELEMEDICINE (OUTPATIENT)
Dept: FAMILY MEDICINE CLINIC | Facility: CLINIC | Age: 40
End: 2025-04-17
Payer: COMMERCIAL

## 2025-04-17 DIAGNOSIS — Z71.3 ENCOUNTER FOR WEIGHT LOSS COUNSELING: Primary | ICD-10-CM

## 2025-04-17 DIAGNOSIS — F41.9 ANXIETY: ICD-10-CM

## 2025-04-17 PROCEDURE — 98005 SYNCH AUDIO-VIDEO EST LOW 20: CPT | Performed by: INTERNAL MEDICINE

## 2025-04-17 RX ORDER — BUPROPION HYDROCHLORIDE 150 MG/1
TABLET ORAL
Qty: 60 TABLET | Refills: 3 | Status: SHIPPED | OUTPATIENT
Start: 2025-04-17

## 2025-04-17 RX ORDER — PHENTERMINE HYDROCHLORIDE 37.5 MG/1
37.5 TABLET ORAL
Qty: 30 TABLET | Refills: 3 | Status: SHIPPED | OUTPATIENT
Start: 2025-04-17

## 2025-04-17 RX ORDER — TOPIRAMATE 25 MG/1
TABLET, FILM COATED ORAL
Qty: 60 TABLET | Refills: 3 | Status: SHIPPED | OUTPATIENT
Start: 2025-04-17

## 2025-04-17 NOTE — PROGRESS NOTES
Video Visit  Iliana Ryan is a 39 year old female.  No chief complaint on file.         The following individual(s) verbally consented to be recorded using ambient AI listening technology and understand that they can each withdraw their consent to this listening technology at any point by asking the clinician to turn off or pause the recording:    Patient name: Iliana Ryan         HPI:   Pt requests a video visit to discuss med follow up.    History of Present Illness  Mrs. Iliana Ryan is a 39 year old female who presents for medication management of Wellbutrin for irritability and phentermine for weight loss.    She has been taking Wellbutrin at a dose of 150 mg daily. She does not notice any significant changes, although she feels 'not quite as short' and acknowledges a slight improvement. It has been a stressful month, which may have affected her perception of the medication's efficacy.    Regarding phentermine, it was initially effective, but she has since become accustomed to it and no longer notices a significant effect. She is taking the full tablet of phentermine, which is 37.5 mg daily. The cost of phentermine is manageable at approximately twenty dollars, but she wants more effective weight management options.        Current Medications[1]   Past Medical History[2]   Past Surgical History[3]   Social History:  Short Social Hx on File[4]     REVIEW OF SYSTEMS:   GENERAL HEALTH: Denies fevers, chills or myalgias  HEENT: Denies ear pain, nasal congestion, sinus pain, or sore throat  SKIN: Denies rash  LUNGS: Denies shortness of breath, wheezing, or cough  CV: Denies chest pain or palpitations; denies lightheadedness  GI: Denies abdominal pain, denies N/V/D  MS: Denies back, neck or joint pain  NEURO: Denies headaches  ALLERGY/ASTHMA: Denies asthma and environmental allergies       EXAM:   GENERAL: well developed, well nourished, NAD.  SKIN: No rash visible  HEENT: AT/NC. Normal lips, gums and  teeth; no hyponasal tone on video visit  LUNGS: Speaking in complete sentences comfortably without increased work of breathing; no cough during visit  PSYCH: Normal mood and affect, A&Ox3, affect is consistent with mood      ASSESSMENT AND PLAN:   1. Encounter for weight loss counseling  - discussed common SEs, always encourage consistent exercise and heart healthy meals  - topiramate 25 MG Oral Tab; 1 tab po Qam before breakfast x 1 week, then increase to 2 tabs po Qam before breakfast.  Dispense: 60 tablet; Refill: 3  - Phentermine HCl 37.5 MG Oral Tab; Take 1 tablet (37.5 mg total) by mouth every morning before breakfast.  Dispense: 30 tablet; Refill: 3    2. Anxiety  - increase to 300mg Wellbutrin daily        The patient indicates understanding of these issues and agrees to the plan.    Follow up by Aisha over the next 1 month    Duration of visit: 24 min      Iliana K Connor understands a video evaluation is not a substitute for face-to-face examination or emergency care. Patient advised to go to ER or call 911 for worsening symptoms or acute distress.   Please note that the following visit was completed using two-way, real-time interactive video communication. This has been done in good regis to provide continuity of care in the best interest of the provider-patient relationship, due to the ongoing public health crisis/national emergency and because of restrictions of visitation. There are limitations of this visit as limited physical exam could be performed. Every conscious effort was taken to allow for sufficient and adequate time. This billing was spent on reviewing labs, medications, radiology tests and decision making. Appropriate medical decision-making and tests are ordered as detailed in the plan of care above.         [1]   Current Outpatient Medications   Medication Sig Dispense Refill    buPROPion  MG Oral Tablet 24 Hr Take 1 tablet (150 mg total) by mouth daily. 30 tablet 1    Phentermine  HCl 37.5 MG Oral Tab Take 1/2 tab po Qam before breakfast x 1 week, then take 1 tab po Qam before breakfast. 30 tablet 1    rosuvastatin 5 MG Oral Tab Take 1 tablet (5 mg total) by mouth nightly. 90 tablet 0    Levothyroxine Sodium 300 MCG Oral Tab Take 1 tablet (300 mcg total) by mouth daily. Take levothyroxine 25mcg with this to equal 325mcg daily. 90 tablet 0    levothyroxine 25 MCG Oral Tab Take 1 tablet (25 mcg total) by mouth before breakfast. Take levothyroxine 300mcg with this to equal 325mcg daily. 90 tablet 0    ketoconazole 2 % External Cream Apply 1 Application topically 2 (two) times daily.      Nystatin 208626 UNIT/GM External Powder Apply topically. Every day prn      rOPINIRole HCl 3 MG Oral Tab Take 1 tablet (3 mg total) by mouth nightly.      XANAX 0.5 MG Oral Tab Take 1 tablet (0.5 mg total) by mouth 4 (four) times daily as needed for Anxiety.      SUMAtriptan Succinate (IMITREX) 100 MG Oral Tab Take 1 tablet (100 mg total) by mouth daily as needed for Migraine (migraines).     [2]   Past Medical History:   Anxiety state    Bigeminy    Calculus of kidney    Depressive disorder    Disorder of thyroid    Dysmenorrhea    Elevated LFTs    Essential hypertension    GERD (gastroesophageal reflux disease)    HEADACHES    History of miscarriage, currently pregnant ([facility])    HYPOTHYROIDISM    Irregular menses    Low-lying placenta ([facility])    Malpositioned IUD    Mental disorder    hx of depression after ectopic pregnancy    Migraines    Painful lumpy left breast    Palpitations    PONV (postoperative nausea and vomiting)    Post partum depression    hx of after ectopic pregnancy, took meds for about 6 weeks    Pregnancy ([facility])    Pregnancy-induced hypertension ([facility])    Shortness of breath    Sleep apnea    Syncope, near    Unspecified hypothyroidism    Visual impairment    glasses   [3]   Past Surgical History:  Procedure Laterality Date          x2    Cholecystectomy  2020    Gastric  bypass,obese<100cm shana-en-y  2022    Laparoscopic salpingostomy Right 2013    right-ectopic    Other surgical history  2005    thyroid removed    Removal gallbladder      Thyroidectomy  2005    nodular goiter    Total abdominal hysterectomy  12/2023    + ovaries, cervix removed   [4]   Social History  Socioeconomic History    Marital status:    Tobacco Use    Smoking status: Never    Smokeless tobacco: Never   Vaping Use    Vaping status: Never Used   Substance and Sexual Activity    Alcohol use: Yes     Comment: once a month    Drug use: No    Sexual activity: Yes     Partners: Male      no

## 2025-04-17 NOTE — PATIENT INSTRUCTIONS
YARELIS Castanon  [email or web address]  [email or web address]    [phone]

## 2025-04-30 ENCOUNTER — APPOINTMENT (OUTPATIENT)
Dept: CT IMAGING | Age: 40
End: 2025-04-30
Attending: EMERGENCY MEDICINE
Payer: COMMERCIAL

## 2025-04-30 ENCOUNTER — HOSPITAL ENCOUNTER (EMERGENCY)
Age: 40
Discharge: HOME | End: 2025-04-30
Attending: EMERGENCY MEDICINE
Payer: COMMERCIAL

## 2025-04-30 VITALS
OXYGEN SATURATION: 100 % | BODY MASS INDEX: 34.46 KG/M2 | TEMPERATURE: 98 F | WEIGHT: 230 LBS | HEART RATE: 94 BPM | DIASTOLIC BLOOD PRESSURE: 86 MMHG | HEIGHT: 68.5 IN | SYSTOLIC BLOOD PRESSURE: 162 MMHG | RESPIRATION RATE: 16 BRPM

## 2025-04-30 DIAGNOSIS — N20.0 KIDNEY STONE ON LEFT SIDE: Primary | ICD-10-CM

## 2025-04-30 LAB
ALBUMIN SERPL-MCNC: 4.8 G/DL (ref 3.2–4.8)
ALBUMIN/GLOB SERPL: 1.5 {RATIO} (ref 1–2)
ALP LIVER SERPL-CCNC: 42 U/L (ref 37–98)
ALT SERPL-CCNC: 18 U/L (ref 10–49)
ANION GAP SERPL CALC-SCNC: 8 MMOL/L (ref 0–18)
AST SERPL-CCNC: 17 U/L (ref ?–34)
B-HCG UR QL: NEGATIVE
BASOPHILS # BLD AUTO: 0.04 X10(3) UL (ref 0–0.2)
BASOPHILS NFR BLD AUTO: 0.5 %
BILIRUB SERPL-MCNC: 0.3 MG/DL (ref 0.3–1.2)
BILIRUB UR QL STRIP.AUTO: NEGATIVE
BUN BLD-MCNC: 13 MG/DL (ref 9–23)
CALCIUM BLD-MCNC: 9.6 MG/DL (ref 8.7–10.6)
CHLORIDE SERPL-SCNC: 106 MMOL/L (ref 98–112)
CO2 SERPL-SCNC: 23 MMOL/L (ref 21–32)
COLOR UR AUTO: YELLOW
CREAT BLD-MCNC: 0.73 MG/DL (ref 0.55–1.02)
EGFRCR SERPLBLD CKD-EPI 2021: 107 ML/MIN/1.73M2 (ref 60–?)
EOSINOPHIL # BLD AUTO: 0.2 X10(3) UL (ref 0–0.7)
EOSINOPHIL NFR BLD AUTO: 2.6 %
ERYTHROCYTE [DISTWIDTH] IN BLOOD BY AUTOMATED COUNT: 14.8 %
GLOBULIN PLAS-MCNC: 3.1 G/DL (ref 2–3.5)
GLUCOSE BLD-MCNC: 122 MG/DL (ref 70–99)
GLUCOSE UR STRIP.AUTO-MCNC: NEGATIVE MG/DL
HCT VFR BLD AUTO: 33.4 % (ref 35–48)
HGB BLD-MCNC: 10.6 G/DL (ref 12–16)
IMM GRANULOCYTES # BLD AUTO: 0.07 X10(3) UL (ref 0–1)
IMM GRANULOCYTES NFR BLD: 0.9 %
KETONES UR STRIP.AUTO-MCNC: NEGATIVE MG/DL
LEUKOCYTE ESTERASE UR QL STRIP.AUTO: NEGATIVE
LYMPHOCYTES # BLD AUTO: 1.76 X10(3) UL (ref 1–4)
LYMPHOCYTES NFR BLD AUTO: 22.5 %
MCH RBC QN AUTO: 23.6 PG (ref 26–34)
MCHC RBC AUTO-ENTMCNC: 31.7 G/DL (ref 31–37)
MCV RBC AUTO: 74.2 FL (ref 80–100)
MONOCYTES # BLD AUTO: 0.7 X10(3) UL (ref 0.1–1)
MONOCYTES NFR BLD AUTO: 9 %
NEUTROPHILS # BLD AUTO: 5.05 X10 (3) UL (ref 1.5–7.7)
NEUTROPHILS # BLD AUTO: 5.05 X10(3) UL (ref 1.5–7.7)
NEUTROPHILS NFR BLD AUTO: 64.5 %
NITRITE UR QL STRIP.AUTO: NEGATIVE
OSMOLALITY SERPL CALC.SUM OF ELEC: 285 MOSM/KG (ref 275–295)
PH UR STRIP.AUTO: 5.5 [PH] (ref 5–8)
PLATELET # BLD AUTO: 346 10(3)UL (ref 150–450)
POTASSIUM SERPL-SCNC: 3.9 MMOL/L (ref 3.5–5.1)
PROT SERPL-MCNC: 7.9 G/DL (ref 5.7–8.2)
PROT UR STRIP.AUTO-MCNC: NEGATIVE MG/DL
RBC # BLD AUTO: 4.5 X10(6)UL (ref 3.8–5.3)
RBC #/AREA URNS AUTO: >10 /HPF
SODIUM SERPL-SCNC: 137 MMOL/L (ref 136–145)
SP GR UR STRIP.AUTO: 1.02 (ref 1–1.03)
UROBILINOGEN UR STRIP.AUTO-MCNC: 0.2 MG/DL
WBC # BLD AUTO: 7.8 X10(3) UL (ref 4–11)

## 2025-04-30 PROCEDURE — 85025 COMPLETE CBC W/AUTO DIFF WBC: CPT | Performed by: EMERGENCY MEDICINE

## 2025-04-30 PROCEDURE — 99285 EMERGENCY DEPT VISIT HI MDM: CPT

## 2025-04-30 PROCEDURE — 80053 COMPREHEN METABOLIC PANEL: CPT | Performed by: EMERGENCY MEDICINE

## 2025-04-30 PROCEDURE — 74176 CT ABD & PELVIS W/O CONTRAST: CPT | Performed by: EMERGENCY MEDICINE

## 2025-04-30 PROCEDURE — 81001 URINALYSIS AUTO W/SCOPE: CPT | Performed by: EMERGENCY MEDICINE

## 2025-04-30 PROCEDURE — 96374 THER/PROPH/DIAG INJ IV PUSH: CPT

## 2025-04-30 PROCEDURE — 81025 URINE PREGNANCY TEST: CPT

## 2025-04-30 PROCEDURE — 81015 MICROSCOPIC EXAM OF URINE: CPT | Performed by: EMERGENCY MEDICINE

## 2025-04-30 PROCEDURE — 96375 TX/PRO/DX INJ NEW DRUG ADDON: CPT

## 2025-04-30 RX ORDER — TAMSULOSIN HYDROCHLORIDE 0.4 MG/1
0.4 CAPSULE ORAL DAILY
Qty: 7 CAPSULE | Refills: 0 | Status: ON HOLD | OUTPATIENT
Start: 2025-04-30 | End: 2025-05-01

## 2025-04-30 RX ORDER — HYDROMORPHONE HYDROCHLORIDE 1 MG/ML
0.5 INJECTION, SOLUTION INTRAMUSCULAR; INTRAVENOUS; SUBCUTANEOUS EVERY 30 MIN PRN
Refills: 0 | Status: DISCONTINUED | OUTPATIENT
Start: 2025-04-30 | End: 2025-04-30

## 2025-04-30 RX ORDER — HYDROCODONE BITARTRATE AND ACETAMINOPHEN 5; 325 MG/1; MG/1
1-2 TABLET ORAL EVERY 4 HOURS PRN
Qty: 20 TABLET | Refills: 0 | Status: SHIPPED | OUTPATIENT
Start: 2025-04-30

## 2025-04-30 RX ORDER — ONDANSETRON 2 MG/ML
4 INJECTION INTRAMUSCULAR; INTRAVENOUS ONCE
Status: COMPLETED | OUTPATIENT
Start: 2025-04-30 | End: 2025-04-30

## 2025-04-30 RX ORDER — KETOROLAC TROMETHAMINE 15 MG/ML
15 INJECTION, SOLUTION INTRAMUSCULAR; INTRAVENOUS ONCE
Status: COMPLETED | OUTPATIENT
Start: 2025-04-30 | End: 2025-04-30

## 2025-04-30 NOTE — ED PROVIDER NOTES
Patient Seen in: [facility] In [place]      History     Chief Complaint   Patient presents with    Back Pain     Stated Complaint: Left low back pain - denies taking medication pta - hx kidney stones    Subjective:   HPI    39-year-old female comes to the hospital complaint of having difficulty with pain in the area of her left flank.  She rates it an 8 out of 10.  Associated with nausea.  She has a history of having kidney stones that she is passed prior which feels similar to this.  There is nothing specific that makes it better or worse.  She has no fevers or chills.  She has no dysuria.  She is denying any other complaints at this time.  History of Present Illness               Objective:     Past Medical History:    Anxiety state    Bigeminy    Calculus of kidney    Depressive disorder    Disorder of thyroid    Dysmenorrhea    Elevated LFTs    Essential hypertension    GERD (gastroesophageal reflux disease)    HEADACHES    History of miscarriage, currently pregnant (HCC)    HYPOTHYROIDISM    Irregular menses    Low-lying placenta (HCC)    Malpositioned IUD    Mental disorder    hx of depression after ectopic pregnancy    Migraines    Painful lumpy left breast    Palpitations    PONV (postoperative nausea and vomiting)    Post partum depression    hx of after ectopic pregnancy, took meds for about 6 weeks    Pregnancy (HCC)    Pregnancy-induced hypertension (HCC)    Shortness of breath    Sleep apnea    Syncope, near    Unspecified hypothyroidism    Visual impairment    glasses              Past Surgical History:   Procedure Laterality Date          x2    Cholecystectomy  2020    Gastric bypass,obese<100cm shana-en-y      Laparoscopic salpingostomy Right     right-ectopic    Other surgical history  2005    thyroid removed    Removal gallbladder      Thyroidectomy      nodular goiter    Total abdominal hysterectomy  2023    + ovaries, cervix removed                 Social History     Socioeconomic History    Marital status:    Tobacco Use    Smoking status: Never    Smokeless tobacco: Never   Vaping Use    Vaping status: Never Used   Substance and Sexual Activity    Alcohol use: Yes     Comment: once a month    Drug use: No    Sexual activity: Yes     Partners: Male                                Physical Exam     ED Triage Vitals [04/30/25 0629]   BP (!) 181/113   Pulse 111   Resp 18   Temp 98.4 °F (36.9 °C)   Temp src Oral   SpO2 100 %   O2 Device None (Room air)       Current Vitals:   Vital Signs  BP: (!) 181/113 (Pt standing, refusing to sit down during VS)  Pulse: 111  Resp: 18  Temp: 98.4 °F (36.9 °C)  Temp src: Oral    Oxygen Therapy  SpO2: 100 %  O2 Device: None (Room air)        Physical Exam  In general the patient appears uncomfortable  HEENT : NCAT, EOMI, PEERL,  neck supple, no JVD, trachea midline, No LAD  Heart: S1S2 normal. No murmurs, regular rate and rhythm  Lungs: Clear to auscultation bilaterally  Abdomen: Soft nontender nondistended normal active bowel sounds without rebound, guarding or masses noted  Back nontender without CVA tenderness  Extremity no clubbing, cyanosis or edema noted.  Full range of motion noted without tenderness  Neuro: No focal deficits noted    All measures to prevent infection transmission during my interaction with the patient were taken.  The patient was already wearing droplet mask on my arrival to the room.  Personal protective equipment including a droplet mask as well as gloves were worn throughout the duration of my exam.  Hand washing was performed prior to and after the exam.  Stethoscope and equipment used during my examination was cleaned with a super Sani cloth germicidal wipe following the exam.  Physical Exam                ED Course     Labs Reviewed   COMP METABOLIC PANEL (14) - Abnormal; Notable for the following components:       Result Value    Glucose 122 (*)     All other components within normal  limits   CBC WITH DIFFERENTIAL WITH PLATELET - Abnormal; Notable for the following components:    HGB 10.6 (*)     HCT 33.4 (*)     MCV 74.2 (*)     MCH 23.6 (*)     All other components within normal limits   URINALYSIS WITH CULTURE REFLEX - Abnormal; Notable for the following components:    Clarity Urine Slightly Cloudy (*)     Blood Urine Large (*)     All other components within normal limits   UA MICROSCOPIC ONLY, URINE - Abnormal; Notable for the following components:    RBC Urine >10 (*)     Bacteria Urine 1+ (*)     Squamous Epi. Cells Moderate (*)     All other components within normal limits   POCT PREGNANCY URINE - Normal       ED Course as of 04/30/25 0803  ------------------------------------------------------------  Time: 04/30 0801  Comment: While here the patient had a urinalysis showing red cells without infection.  Pregnancy test was negative.  Her CBC had a hemoglobin of 10.6 and her CMP was unremarkable.  She is now pain-free after Toradol and Dilaudid given.  Her nausea is better with Zofran.  She had a CT of the abdomen pelvis that I interpreted consistent with a left-sided proximal ureteral stone.  I reviewed the radiology report as well.     Results            CT ABDOMEN+PELVIS KIDNEYSTONE 2D RNDR(NO IV,NO ORAL)(CPT=74176)  Result Date: 4/30/2025  PROCEDURE:  CT ABDOMEN+PELVIS KIDNEYSTONE 2D RNDR(NO IV,NO ORAL)(CPT=74176)  [facility]  [facility]LV[facility](CONTRAST ONLY)(CPT=74177), 1/13/2025, 3:56 PM.  [place], CT, CT ABDOMEN+PELVIS KIDNEYSTONE 2D RNDR(NO IV,NO ORAL)(CPT=74176), 2/26/2023, 9:16 AM.  INDICATIONS:  Left low back pain - denies taking medication pta - hx kidney stones  TECHNIQUE:  Unenhanced multislice CT scanning from above the kidneys to below the urinary bladder.  2D rendering are generated on the CT scanner workstation to localize potential stones in the cranio-caudal plane.  Dose reduction techniques were used. Dose information is transmitted to the ACR  (American College of Radiology) NRDR (National Radiology Data Registry) which includes the Dose Index Registry.  PATIENT STATED HISTORY: (As transcribed by Technologist)  Acute right flank pain, Hx of kidney stones.    FINDINGS:  LUNG BASES:  Lung bases are clear. LIVER:  Normal in shape and contour but limited evaluation without contrast. BILIARY:   Cholecystectomy clips.  No intrahepatic biliary dilatation.. SPLEEN:  Normal.  No enlargement or focal lesion. PANCREAS:  No mino-pancreatic inflammatory stranding ADRENALS:  Normal.  No mass or enlargement.  KIDNEYS:  Mild left-sided hydronephrosis.  There is a 5 mm proximal left ureteral calculus.  Nonobstructing bilateral renal calculi. BOWEL/MESENTERY:  Bowel is normal in caliber. No evidence of obstruction.  Postoperative changes from gastric bypass PELVIS:  Bladder is unremarkable in appearance.  Calcified phleboliths in the pelvis.   AORTA/VASCULAR:  Aorta is normal in caliber. BONES:  Degenerative changes greatest at L5-S1 level.            CONCLUSION:  1. Mild left-sided hydronephrosis with a 5 mm proximal left ureteral calculus. 2. Nonobstructing bilateral renal calculi.    LOCATION:  [id number]   Dictated by (CST): Richie Almonte MD on 4/30/2025 at 7:54 AM     Finalized by (CST): Richie Almonte MD on 4/30/2025 at 7:57 AM       Medications   HYDROmorphone (Dilaudid) 1 MG/ML injection 0.5 mg (0.5 mg Intravenous Given 4/30/25 0648)   ketorolac (Toradol) 15 MG/ML injection 15 mg (15 mg Intravenous Given 4/30/25 0644)   ondansetron (Zofran) 4 MG/2ML injection 4 mg (4 mg Intravenous Given 4/30/25 0643)                          MDM      Differential diagnosis included ureteral stone, UTI, pyelonephritis but limited such.  Patient's workup is consistent with a left-sided ureteral stone.  She is comfortable this time we discharged home to follow-up with urology.    Patient was screened and evaluated during this visit.   As a treating physician attending to the  patient, I determined, within reasonable clinical confidence and prior to discharge, that an emergency medical condition was not or was no longer present.  There was no indication for further evaluation, treatment or admission on an emergency basis.       The usual and customary discharge instuctions were discussed given the patient's ER course.  We discussed signs and symptoms that should prompt the patient's immediate return to the emergency department.   Reasonable over the counter and prescription treatment options and Physician follow up plan was discussed.       The patient is discharged in good condition.     This note was prepared using Dragon Medical voice recognition dictation software.  As a result errors may occur.  When identified to these areas have been corrected.  While every attempt is made to correct errors during dictation discrepancies may still exist.  Please contact if there are any errors.        Medical Decision Making      Disposition and Plan     Clinical Impression:  1. Kidney stone on left side         Disposition:  Discharge  4/30/2025  8:03 am    Follow-up:  Darrel Delatorre MD  [address]  [facility] [address]  [phone]    Schedule an appointment as soon as possible for a visit in 2 day(s)            Medications Prescribed:  Current Discharge Medication List        START taking these medications    Details   tamsulosin (FLOMAX) 0.4 MG Oral Cap Take 1 capsule (0.4 mg total) by mouth daily for 7 days.  Qty: 7 capsule, Refills: 0      HYDROcodone-acetaminophen 5-325 MG Oral Tab Take 1-2 tablets by mouth every 4 (four) hours as needed for Pain.  Qty: 20 tablet, Refills: 0    Associated Diagnoses: Kidney stone on left side      !! Naloxone HCl 4 MG/0.1ML Nasal Liquid 4 mg by Nasal route as needed. If patient remains unresponsive, repeat dose in other nostril 2-5 minutes after first dose.  Qty: 1 kit, Refills: 0      !! Naloxone HCl 4 MG/0.1ML Nasal Liquid 4 mg by Intranasal  route as needed (May repeat as needed in other nostril if symptoms persist). If patient remains unresponsive, repeat dose in other nostril 2-5 minutes after first dose.  Qty: 1 kit, Refills: 0       !! - Potential duplicate medications found. Please discuss with provider.          Supplementary Documentation:

## 2025-04-30 NOTE — ED INITIAL ASSESSMENT (HPI)
Patient to ED with c/o right lower flank pain that \"woke me up out of my sleep\". No pain medication taken PTA. Nausea present. Denies any injury. Patient has a history of kidney stones. Denies any urinary symptoms.

## 2025-05-01 ENCOUNTER — HOSPITAL ENCOUNTER (OUTPATIENT)
Facility: HOSPITAL | Age: 40
Setting detail: OBSERVATION
Discharge: HOME | End: 2025-05-02
Attending: EMERGENCY MEDICINE | Admitting: HOSPITALIST
Payer: COMMERCIAL

## 2025-05-01 ENCOUNTER — APPOINTMENT (OUTPATIENT)
Dept: GENERAL RADIOLOGY | Facility: HOSPITAL | Age: 40
End: 2025-05-01
Attending: UROLOGY
Payer: COMMERCIAL

## 2025-05-01 ENCOUNTER — ANESTHESIA EVENT (OUTPATIENT)
Dept: SURGERY | Facility: HOSPITAL | Age: 40
End: 2025-05-01
Payer: COMMERCIAL

## 2025-05-01 ENCOUNTER — ANESTHESIA (OUTPATIENT)
Dept: SURGERY | Facility: HOSPITAL | Age: 40
End: 2025-05-01
Payer: COMMERCIAL

## 2025-05-01 ENCOUNTER — TELEPHONE (OUTPATIENT)
Dept: SURGERY | Facility: CLINIC | Age: 40
End: 2025-05-01

## 2025-05-01 DIAGNOSIS — N20.0 KIDNEY STONE: Primary | ICD-10-CM

## 2025-05-01 DIAGNOSIS — F41.9 ANXIETY: ICD-10-CM

## 2025-05-01 DIAGNOSIS — R10.9 INTRACTABLE ABDOMINAL PAIN: Primary | ICD-10-CM

## 2025-05-01 DIAGNOSIS — F41.8 DEPRESSION WITH ANXIETY: ICD-10-CM

## 2025-05-01 DIAGNOSIS — N20.0 KIDNEY STONE: ICD-10-CM

## 2025-05-01 LAB
ALBUMIN SERPL-MCNC: 4.6 G/DL (ref 3.2–4.8)
ALBUMIN/GLOB SERPL: 1.6 {RATIO} (ref 1–2)
ALP LIVER SERPL-CCNC: 39 U/L (ref 37–98)
ALT SERPL-CCNC: 13 U/L (ref 10–49)
ANION GAP SERPL CALC-SCNC: 9 MMOL/L (ref 0–18)
AST SERPL-CCNC: 16 U/L (ref ?–34)
BASOPHILS # BLD AUTO: 0.04 X10(3) UL (ref 0–0.2)
BASOPHILS NFR BLD AUTO: 0.4 %
BILIRUB SERPL-MCNC: 0.3 MG/DL (ref 0.3–1.2)
BILIRUB UR QL STRIP.AUTO: NEGATIVE
BUN BLD-MCNC: 12 MG/DL (ref 9–23)
CALCIUM BLD-MCNC: 9.6 MG/DL (ref 8.7–10.6)
CHLORIDE SERPL-SCNC: 108 MMOL/L (ref 98–112)
CLARITY UR REFRACT.AUTO: CLEAR
CO2 SERPL-SCNC: 22 MMOL/L (ref 21–32)
CREAT BLD-MCNC: 0.76 MG/DL (ref 0.55–1.02)
EGFRCR SERPLBLD CKD-EPI 2021: 102 ML/MIN/1.73M2 (ref 60–?)
EOSINOPHIL # BLD AUTO: 0.19 X10(3) UL (ref 0–0.7)
EOSINOPHIL NFR BLD AUTO: 2.1 %
ERYTHROCYTE [DISTWIDTH] IN BLOOD BY AUTOMATED COUNT: 14.8 %
GLOBULIN PLAS-MCNC: 2.9 G/DL (ref 2–3.5)
GLUCOSE BLD-MCNC: 130 MG/DL (ref 70–99)
GLUCOSE UR STRIP.AUTO-MCNC: NORMAL MG/DL
HCT VFR BLD AUTO: 32.1 % (ref 35–48)
HGB BLD-MCNC: 10 G/DL (ref 12–16)
IMM GRANULOCYTES # BLD AUTO: 0.06 X10(3) UL (ref 0–1)
IMM GRANULOCYTES NFR BLD: 0.7 %
KETONES UR STRIP.AUTO-MCNC: NEGATIVE MG/DL
LEUKOCYTE ESTERASE UR QL STRIP.AUTO: NEGATIVE
LYMPHOCYTES # BLD AUTO: 1.51 X10(3) UL (ref 1–4)
LYMPHOCYTES NFR BLD AUTO: 16.9 %
MCH RBC QN AUTO: 23 PG (ref 26–34)
MCHC RBC AUTO-ENTMCNC: 31.2 G/DL (ref 31–37)
MCV RBC AUTO: 73.8 FL (ref 80–100)
MONOCYTES # BLD AUTO: 0.75 X10(3) UL (ref 0.1–1)
MONOCYTES NFR BLD AUTO: 8.4 %
NEUTROPHILS # BLD AUTO: 6.37 X10 (3) UL (ref 1.5–7.7)
NEUTROPHILS # BLD AUTO: 6.37 X10(3) UL (ref 1.5–7.7)
NEUTROPHILS NFR BLD AUTO: 71.5 %
NITRITE UR QL STRIP.AUTO: NEGATIVE
OSMOLALITY SERPL CALC.SUM OF ELEC: 290 MOSM/KG (ref 275–295)
PH UR STRIP.AUTO: 5 [PH] (ref 5–8)
PLATELET # BLD AUTO: 301 10(3)UL (ref 150–450)
POTASSIUM SERPL-SCNC: 4.2 MMOL/L (ref 3.5–5.1)
PROT SERPL-MCNC: 7.5 G/DL (ref 5.7–8.2)
PROT UR STRIP.AUTO-MCNC: NEGATIVE MG/DL
RBC # BLD AUTO: 4.35 X10(6)UL (ref 3.8–5.3)
RBC #/AREA URNS AUTO: >10 /HPF
SODIUM SERPL-SCNC: 139 MMOL/L (ref 136–145)
SP GR UR STRIP.AUTO: 1.01 (ref 1–1.03)
UROBILINOGEN UR STRIP.AUTO-MCNC: NORMAL MG/DL
WBC # BLD AUTO: 8.9 X10(3) UL (ref 4–11)

## 2025-05-01 PROCEDURE — 52332 CYSTOSCOPY AND TREATMENT: CPT | Performed by: UROLOGY

## 2025-05-01 PROCEDURE — 99223 1ST HOSP IP/OBS HIGH 75: CPT | Performed by: HOSPITALIST

## 2025-05-01 PROCEDURE — 74420 UROGRAPHY RTRGR +-KUB: CPT | Performed by: UROLOGY

## 2025-05-01 DEVICE — URETERAL STENT
Type: IMPLANTABLE DEVICE | Site: URETER | Status: FUNCTIONAL
Brand: ASCERTA™

## 2025-05-01 RX ORDER — DEXAMETHASONE SODIUM PHOSPHATE 4 MG/ML
VIAL (ML) INJECTION AS NEEDED
Status: DISCONTINUED | OUTPATIENT
Start: 2025-05-01 | End: 2025-05-01 | Stop reason: SURG

## 2025-05-01 RX ORDER — CEFAZOLIN SODIUM 1 G/3ML
INJECTION, POWDER, FOR SOLUTION INTRAMUSCULAR; INTRAVENOUS AS NEEDED
Status: DISCONTINUED | OUTPATIENT
Start: 2025-05-01 | End: 2025-05-01 | Stop reason: SURG

## 2025-05-01 RX ORDER — MORPHINE SULFATE 4 MG/ML
4 INJECTION, SOLUTION INTRAMUSCULAR; INTRAVENOUS EVERY 2 HOUR PRN
Status: DISCONTINUED | OUTPATIENT
Start: 2025-05-01 | End: 2025-05-02

## 2025-05-01 RX ORDER — HYDROMORPHONE HYDROCHLORIDE 1 MG/ML
0.2 INJECTION, SOLUTION INTRAMUSCULAR; INTRAVENOUS; SUBCUTANEOUS EVERY 5 MIN PRN
Status: DISCONTINUED | OUTPATIENT
Start: 2025-05-01 | End: 2025-05-01 | Stop reason: HOSPADM

## 2025-05-01 RX ORDER — MIDAZOLAM HYDROCHLORIDE 1 MG/ML
1 INJECTION INTRAMUSCULAR; INTRAVENOUS EVERY 5 MIN PRN
Status: DISCONTINUED | OUTPATIENT
Start: 2025-05-01 | End: 2025-05-01 | Stop reason: HOSPADM

## 2025-05-01 RX ORDER — PROCHLORPERAZINE EDISYLATE 5 MG/ML
5 INJECTION INTRAMUSCULAR; INTRAVENOUS EVERY 8 HOURS PRN
Status: DISCONTINUED | OUTPATIENT
Start: 2025-05-01 | End: 2025-05-01 | Stop reason: HOSPADM

## 2025-05-01 RX ORDER — BUPROPION HYDROCHLORIDE 150 MG/1
150 TABLET ORAL DAILY
Status: DISCONTINUED | OUTPATIENT
Start: 2025-05-01 | End: 2025-05-02

## 2025-05-01 RX ORDER — ALPRAZOLAM 0.5 MG
0.5 TABLET ORAL 3 TIMES DAILY
Status: ON HOLD | COMMUNITY
End: 2025-05-02

## 2025-05-01 RX ORDER — PHENAZOPYRIDINE HYDROCHLORIDE 100 MG/1
100 TABLET, FILM COATED ORAL 3 TIMES DAILY PRN
Qty: 10 TABLET | Refills: 0 | Status: SHIPPED | OUTPATIENT
Start: 2025-05-01 | End: 2025-05-05

## 2025-05-01 RX ORDER — ONDANSETRON 2 MG/ML
4 INJECTION INTRAMUSCULAR; INTRAVENOUS ONCE
Status: COMPLETED | OUTPATIENT
Start: 2025-05-01 | End: 2025-05-01

## 2025-05-01 RX ORDER — ACETAMINOPHEN 325 MG/1
650 TABLET ORAL EVERY 4 HOURS PRN
Status: DISCONTINUED | OUTPATIENT
Start: 2025-05-01 | End: 2025-05-02

## 2025-05-01 RX ORDER — HYDROCODONE BITARTRATE AND ACETAMINOPHEN 5; 325 MG/1; MG/1
1 TABLET ORAL ONCE AS NEEDED
Status: DISCONTINUED | OUTPATIENT
Start: 2025-05-01 | End: 2025-05-01 | Stop reason: HOSPADM

## 2025-05-01 RX ORDER — ACETAMINOPHEN 500 MG
1000 TABLET ORAL EVERY 4 HOURS PRN
Status: DISCONTINUED | OUTPATIENT
Start: 2025-05-01 | End: 2025-05-02

## 2025-05-01 RX ORDER — LEVOTHYROXINE SODIUM 150 UG/1
300 TABLET ORAL DAILY
Status: DISCONTINUED | OUTPATIENT
Start: 2025-05-01 | End: 2025-05-02

## 2025-05-01 RX ORDER — ONDANSETRON 2 MG/ML
4 INJECTION INTRAMUSCULAR; INTRAVENOUS EVERY 6 HOURS PRN
Status: DISCONTINUED | OUTPATIENT
Start: 2025-05-01 | End: 2025-05-01 | Stop reason: HOSPADM

## 2025-05-01 RX ORDER — SENNOSIDES 8.6 MG
17.2 TABLET ORAL NIGHTLY PRN
Status: DISCONTINUED | OUTPATIENT
Start: 2025-05-01 | End: 2025-05-02

## 2025-05-01 RX ORDER — SODIUM CHLORIDE 9 MG/ML
INJECTION, SOLUTION INTRAVENOUS CONTINUOUS PRN
Status: DISCONTINUED | OUTPATIENT
Start: 2025-05-01 | End: 2025-05-01 | Stop reason: SURG

## 2025-05-01 RX ORDER — DIPHENHYDRAMINE HYDROCHLORIDE 50 MG/ML
12.5 INJECTION, SOLUTION INTRAMUSCULAR; INTRAVENOUS AS NEEDED
Status: DISCONTINUED | OUTPATIENT
Start: 2025-05-01 | End: 2025-05-01 | Stop reason: HOSPADM

## 2025-05-01 RX ORDER — SODIUM PHOSPHATE, DIBASIC AND SODIUM PHOSPHATE, MONOBASIC 7; 19 G/230ML; G/230ML
1 ENEMA RECTAL ONCE AS NEEDED
Status: DISCONTINUED | OUTPATIENT
Start: 2025-05-01 | End: 2025-05-02

## 2025-05-01 RX ORDER — HYDROMORPHONE HYDROCHLORIDE 1 MG/ML
0.4 INJECTION, SOLUTION INTRAMUSCULAR; INTRAVENOUS; SUBCUTANEOUS EVERY 5 MIN PRN
Status: DISCONTINUED | OUTPATIENT
Start: 2025-05-01 | End: 2025-05-01 | Stop reason: HOSPADM

## 2025-05-01 RX ORDER — SCOPOLAMINE 1 MG/3D
PATCH, EXTENDED RELEASE TRANSDERMAL AS NEEDED
Status: DISCONTINUED | OUTPATIENT
Start: 2025-05-01 | End: 2025-05-01 | Stop reason: SURG

## 2025-05-01 RX ORDER — ONDANSETRON 2 MG/ML
INJECTION INTRAMUSCULAR; INTRAVENOUS AS NEEDED
Status: DISCONTINUED | OUTPATIENT
Start: 2025-05-01 | End: 2025-05-01 | Stop reason: SURG

## 2025-05-01 RX ORDER — HYDROMORPHONE HYDROCHLORIDE 1 MG/ML
0.6 INJECTION, SOLUTION INTRAMUSCULAR; INTRAVENOUS; SUBCUTANEOUS EVERY 5 MIN PRN
Status: DISCONTINUED | OUTPATIENT
Start: 2025-05-01 | End: 2025-05-01 | Stop reason: HOSPADM

## 2025-05-01 RX ORDER — POLYETHYLENE GLYCOL 3350 17 G/17G
17 POWDER, FOR SOLUTION ORAL DAILY PRN
Status: DISCONTINUED | OUTPATIENT
Start: 2025-05-01 | End: 2025-05-02

## 2025-05-01 RX ORDER — LEVOTHYROXINE SODIUM 25 UG/1
25 TABLET ORAL
Status: DISCONTINUED | OUTPATIENT
Start: 2025-05-01 | End: 2025-05-02

## 2025-05-01 RX ORDER — POLYETHYLENE GLYCOL 3350 17 G/17G
17 POWDER, FOR SOLUTION ORAL DAILY PRN
Qty: 20 PACKET | Refills: 1 | Status: SHIPPED | OUTPATIENT
Start: 2025-05-01

## 2025-05-01 RX ORDER — SODIUM CHLORIDE 9 MG/ML
1000 INJECTION, SOLUTION INTRAVENOUS ONCE
Status: COMPLETED | OUTPATIENT
Start: 2025-05-01 | End: 2025-05-01

## 2025-05-01 RX ORDER — TAMSULOSIN HYDROCHLORIDE 0.4 MG/1
0.4 CAPSULE ORAL DAILY
Status: DISCONTINUED | OUTPATIENT
Start: 2025-05-01 | End: 2025-05-02

## 2025-05-01 RX ORDER — ROPINIROLE 2 MG/1
6 TABLET, FILM COATED ORAL NIGHTLY
Status: DISCONTINUED | OUTPATIENT
Start: 2025-05-01 | End: 2025-05-02

## 2025-05-01 RX ORDER — SODIUM CHLORIDE 9 MG/ML
INJECTION, SOLUTION INTRAVENOUS CONTINUOUS
Status: ACTIVE | OUTPATIENT
Start: 2025-05-01 | End: 2025-05-01

## 2025-05-01 RX ORDER — HYDROCODONE BITARTRATE AND ACETAMINOPHEN 5; 325 MG/1; MG/1
2 TABLET ORAL EVERY 4 HOURS PRN
Refills: 0 | Status: DISCONTINUED | OUTPATIENT
Start: 2025-05-01 | End: 2025-05-02

## 2025-05-01 RX ORDER — LIDOCAINE HYDROCHLORIDE 20 MG/ML
JELLY TOPICAL AS NEEDED
Status: DISCONTINUED | OUTPATIENT
Start: 2025-05-01 | End: 2025-05-01 | Stop reason: HOSPADM

## 2025-05-01 RX ORDER — LIDOCAINE HYDROCHLORIDE 10 MG/ML
INJECTION, SOLUTION EPIDURAL; INFILTRATION; INTRACAUDAL; PERINEURAL AS NEEDED
Status: DISCONTINUED | OUTPATIENT
Start: 2025-05-01 | End: 2025-05-01 | Stop reason: SURG

## 2025-05-01 RX ORDER — ACETAMINOPHEN 500 MG
1000 TABLET ORAL ONCE AS NEEDED
Status: DISCONTINUED | OUTPATIENT
Start: 2025-05-01 | End: 2025-05-01 | Stop reason: HOSPADM

## 2025-05-01 RX ORDER — NALOXONE HYDROCHLORIDE 0.4 MG/ML
80 INJECTION, SOLUTION INTRAMUSCULAR; INTRAVENOUS; SUBCUTANEOUS AS NEEDED
Status: DISCONTINUED | OUTPATIENT
Start: 2025-05-01 | End: 2025-05-01 | Stop reason: HOSPADM

## 2025-05-01 RX ORDER — SODIUM CHLORIDE, SODIUM LACTATE, POTASSIUM CHLORIDE, CALCIUM CHLORIDE 600; 310; 30; 20 MG/100ML; MG/100ML; MG/100ML; MG/100ML
INJECTION, SOLUTION INTRAVENOUS CONTINUOUS
Status: DISCONTINUED | OUTPATIENT
Start: 2025-05-01 | End: 2025-05-01 | Stop reason: HOSPADM

## 2025-05-01 RX ORDER — ROPINIROLE 3 MG/1
6 TABLET, FILM COATED ORAL NIGHTLY
COMMUNITY

## 2025-05-01 RX ORDER — IOPAMIDOL 612 MG/ML
INJECTION, SOLUTION INTRAVASCULAR AS NEEDED
Status: DISCONTINUED | OUTPATIENT
Start: 2025-05-01 | End: 2025-05-01 | Stop reason: HOSPADM

## 2025-05-01 RX ORDER — TAMSULOSIN HYDROCHLORIDE 0.4 MG/1
0.4 CAPSULE ORAL DAILY
Qty: 7 CAPSULE | Refills: 0 | Status: SHIPPED | OUTPATIENT
Start: 2025-05-01 | End: 2025-05-08

## 2025-05-01 RX ORDER — HYDROCODONE BITARTRATE AND ACETAMINOPHEN 5; 325 MG/1; MG/1
2 TABLET ORAL ONCE AS NEEDED
Status: DISCONTINUED | OUTPATIENT
Start: 2025-05-01 | End: 2025-05-01 | Stop reason: HOSPADM

## 2025-05-01 RX ORDER — PROCHLORPERAZINE EDISYLATE 5 MG/ML
5 INJECTION INTRAMUSCULAR; INTRAVENOUS EVERY 8 HOURS PRN
Status: DISCONTINUED | OUTPATIENT
Start: 2025-05-01 | End: 2025-05-02

## 2025-05-01 RX ORDER — HYDROMORPHONE HYDROCHLORIDE 1 MG/ML
0.5 INJECTION, SOLUTION INTRAMUSCULAR; INTRAVENOUS; SUBCUTANEOUS EVERY 30 MIN PRN
Refills: 0 | Status: DISCONTINUED | OUTPATIENT
Start: 2025-05-01 | End: 2025-05-02

## 2025-05-01 RX ORDER — HYDROCODONE BITARTRATE AND ACETAMINOPHEN 5; 325 MG/1; MG/1
1 TABLET ORAL EVERY 4 HOURS PRN
Refills: 0 | Status: DISCONTINUED | OUTPATIENT
Start: 2025-05-01 | End: 2025-05-02

## 2025-05-01 RX ORDER — BISACODYL 10 MG
10 SUPPOSITORY, RECTAL RECTAL
Status: DISCONTINUED | OUTPATIENT
Start: 2025-05-01 | End: 2025-05-02

## 2025-05-01 RX ORDER — MORPHINE SULFATE 2 MG/ML
1 INJECTION, SOLUTION INTRAMUSCULAR; INTRAVENOUS EVERY 2 HOUR PRN
Status: DISCONTINUED | OUTPATIENT
Start: 2025-05-01 | End: 2025-05-02

## 2025-05-01 RX ORDER — MORPHINE SULFATE 2 MG/ML
2 INJECTION, SOLUTION INTRAMUSCULAR; INTRAVENOUS EVERY 2 HOUR PRN
Status: DISCONTINUED | OUTPATIENT
Start: 2025-05-01 | End: 2025-05-02

## 2025-05-01 RX ORDER — ROCURONIUM BROMIDE 10 MG/ML
INJECTION, SOLUTION INTRAVENOUS AS NEEDED
Status: DISCONTINUED | OUTPATIENT
Start: 2025-05-01 | End: 2025-05-01 | Stop reason: SURG

## 2025-05-01 RX ORDER — TOPIRAMATE 25 MG/1
50 TABLET, FILM COATED ORAL DAILY
Status: DISCONTINUED | OUTPATIENT
Start: 2025-05-01 | End: 2025-05-02

## 2025-05-01 RX ORDER — ONDANSETRON 2 MG/ML
4 INJECTION INTRAMUSCULAR; INTRAVENOUS EVERY 4 HOURS PRN
Status: DISPENSED | OUTPATIENT
Start: 2025-05-01 | End: 2025-05-01

## 2025-05-01 RX ORDER — OXYBUTYNIN CHLORIDE 5 MG/1
5 TABLET ORAL 3 TIMES DAILY PRN
Qty: 15 TABLET | Refills: 0 | Status: SHIPPED | OUTPATIENT
Start: 2025-05-01 | End: 2025-05-05

## 2025-05-01 RX ORDER — ROSUVASTATIN CALCIUM 5 MG/1
5 TABLET, COATED ORAL NIGHTLY
Status: DISCONTINUED | OUTPATIENT
Start: 2025-05-01 | End: 2025-05-02

## 2025-05-01 RX ORDER — ONDANSETRON 2 MG/ML
4 INJECTION INTRAMUSCULAR; INTRAVENOUS EVERY 6 HOURS PRN
Status: DISCONTINUED | OUTPATIENT
Start: 2025-05-01 | End: 2025-05-02

## 2025-05-01 RX ORDER — ECHINACEA PURPUREA EXTRACT 125 MG
1 TABLET ORAL
Status: DISCONTINUED | OUTPATIENT
Start: 2025-05-01 | End: 2025-05-02

## 2025-05-01 RX ORDER — ALPRAZOLAM 0.5 MG
0.5 TABLET ORAL 3 TIMES DAILY PRN
Status: DISCONTINUED | OUTPATIENT
Start: 2025-05-01 | End: 2025-05-02

## 2025-05-01 RX ORDER — KETOROLAC TROMETHAMINE 30 MG/ML
30 INJECTION, SOLUTION INTRAMUSCULAR; INTRAVENOUS ONCE
Status: COMPLETED | OUTPATIENT
Start: 2025-05-01 | End: 2025-05-01

## 2025-05-01 RX ORDER — CEPHALEXIN 500 MG/1
500 CAPSULE ORAL 3 TIMES DAILY
Qty: 9 CAPSULE | Refills: 0 | Status: SHIPPED | OUTPATIENT
Start: 2025-05-02 | End: 2025-05-07 | Stop reason: ALTCHOICE

## 2025-05-01 RX ORDER — HYDROMORPHONE HYDROCHLORIDE 1 MG/ML
0.5 INJECTION, SOLUTION INTRAMUSCULAR; INTRAVENOUS; SUBCUTANEOUS ONCE
Refills: 0 | Status: COMPLETED | OUTPATIENT
Start: 2025-05-01 | End: 2025-05-01

## 2025-05-01 RX ADMIN — ONDANSETRON 4 MG: 2 INJECTION INTRAMUSCULAR; INTRAVENOUS at 20:51:00

## 2025-05-01 RX ADMIN — Medication 2 G: at 20:50:00

## 2025-05-01 RX ADMIN — SCOPOLAMINE 1 PATCH: 1 PATCH, EXTENDED RELEASE TRANSDERMAL at 20:28:00

## 2025-05-01 RX ADMIN — LIDOCAINE HYDROCHLORIDE 50 MG: 10 INJECTION, SOLUTION EPIDURAL; INFILTRATION; INTRACAUDAL; PERINEURAL at 20:45:00

## 2025-05-01 RX ADMIN — DEXAMETHASONE SODIUM PHOSPHATE 8 MG: 4 MG/ML VIAL (ML) INJECTION at 20:54:00

## 2025-05-01 RX ADMIN — CEFAZOLIN SODIUM 1 G: 1 INJECTION, POWDER, FOR SOLUTION INTRAMUSCULAR; INTRAVENOUS at 20:50:00

## 2025-05-01 RX ADMIN — ROCURONIUM BROMIDE 10 MG: 10 INJECTION, SOLUTION INTRAVENOUS at 20:45:00

## 2025-05-01 RX ADMIN — SODIUM CHLORIDE: 9 INJECTION, SOLUTION INTRAVENOUS at 20:42:00

## 2025-05-01 NOTE — ED QUICK NOTES
Orders for admission, patient is aware of plan and ready to go upstairs. Any questions, please call ED RN Flo at extension [phone].     Patient Covid vaccination status: Fully vaccinated     COVID Test Ordered in ED: None    COVID Suspicion at Admission: N/A    Running Infusions: Medication Infusions[1]     Mental Status/LOC at time of transport: A&O x 4    Other pertinent information:   CIWA score: N/A   NIH score:  N/A             [1]

## 2025-05-01 NOTE — H&P
[facility]     Iliana Ryan Patient Status:  Observation    1985 MRN [id number]   Location [facility] 3SW-A Attending Demian Barth MD   Hosp Day # 0 PCP No primary care provider on file.     Chief Complaint:   Chief Complaint   Patient presents with    Abdomen/Flank Pain       Subjective:    History of Present Illness:     Iliana Ryan is a 39 year old female with a past medical history of HTN, GERD, hypothyroid, KHLOE. She comes to the ED due to left flank pain.      History/Other:    Past Medical History:  Past Medical History[1]  Past Surgical History:   Past Surgical History[2]   Family History:   Family History[3]  Social History:    reports that she has never smoked. She has never used smokeless tobacco. She reports current alcohol use. She reports that she does not use drugs.     Allergies: Allergies[4]    Medications:  Medications Ordered Prior to Encounter[5]    Review of Systems:   A comprehensive review of systems was completed.    Pertinent positives and negatives noted in the HPI.    Objective:   Physical Exam:    /78 (BP Location: Left arm)   Pulse 82   Temp 98.5 °F (36.9 °C) (Oral)   Resp 18   Ht 5' 8\" (1.727 m)   Wt 280 lb (127 kg)   LMP 2023 (Approximate)   SpO2 97%   BMI 42.57 kg/m²   General: No acute distress, Alert  Respiratory: No rhonchi, no wheezes  Cardiovascular: S1, S2.   Abdomen: Soft, Non-tender, Non-distended, Positive bowel sounds  Neuro: No new focal deficits  Extremities: No edema      Results:    Labs:      Labs Last 24 Hours:  Recent Labs   Lab 25  0637 25  0408   WBC 7.8 8.9   HGB 10.6* 10.0*   MCV 74.2* 73.8*   .0 301.0       Recent Labs   Lab 25  0637 25  0408   * 130*   BUN 13 12   CREATSERUM 0.73 0.76   CA 9.6 9.6   ALB 4.8 4.6    139   K 3.9 4.2    108   CO2 23.0 22.0   ALKPHO 42 39   AST 17 16   ALT 18 13   BILT 0.3 0.3   TP 7.9 7.5        Estimated Glomerular Filtration Rate: 102 mL/min/1.73m2 (result from lab).    No results for input(s): \"TROP\", \"TROPHS\", \"CK\" in the last 168 hours.    No results for input(s): \"PTP\", \"INR\" in the last 168 hours.    No results for input(s): \"TROP\", \"CK\" in the last 168 hours.      Imaging: Imaging data reviewed in Epic.    Assessment & Plan:      #Renal colic  IVF   Pain control    #Left nephrolithiasis with mild hydronephrosis  Cysto today    #Hypothyroid  Synthroid    #HLD  Statin    #Migraines  #Anxiety    All diagnosis' and recommendations discussed with patient and/or family in detail.      Plan of care discussed with ED physician      Demian Barth MD    Supplementary Documentation:     The 21st Century Cures Act makes medical notes like these available to patients in the interest of transparency. Please be advised this is a medical document. Medical documents are intended to carry relevant information, facts as evident, and the clinical opinion of the practitioner. The medical note is intended as peer to peer communication and may appear blunt or direct. It is written in medical language and may contain abbreviations or verbiage that are unfamiliar.                                         [1]   Past Medical History:   Anxiety state    Bigeminy    Calculus of kidney    Depressive disorder    Disorder of thyroid    Dysmenorrhea    Elevated LFTs    Essential hypertension    GERD (gastroesophageal reflux disease)    HEADACHES    History of miscarriage, currently pregnant (HCC)    HYPOTHYROIDISM    Irregular menses    Low-lying placenta (HCC)    Malpositioned IUD    Mental disorder    hx of depression after ectopic pregnancy    Migraines    Painful lumpy left breast    Palpitations    PONV (postoperative nausea and vomiting)    Post partum depression    hx of after ectopic pregnancy, took meds for about 6 weeks    Pregnancy (HCC)    Pregnancy-induced hypertension (HCC)    Shortness of breath    Sleep apnea     Syncope, near    Unspecified hypothyroidism    Visual impairment    glasses   [2]   Past Surgical History:  Procedure Laterality Date          x2    Cholecystectomy  2020    Gastric bypass,obese<100cm shana-en-y      Laparoscopic salpingostomy Right     right-ectopic    Other surgical history      thyroid removed    Removal gallbladder      Thyroidectomy      nodular goiter    Total abdominal hysterectomy  2023    + ovaries, cervix removed   [3]   Family History  Problem Relation Age of Onset    Diabetes Father     Hypertension Father     Diabetes Maternal Grandfather     Cancer Maternal Grandfather         prostate    Hypertension Maternal Grandfather     Heart Disorder Maternal Grandfather     Lung Disorder Maternal Grandfather     Other (Tumor in bowel) Maternal Aunt     Breast Cancer Other         MGGM   [4] No Known Allergies  [5]   No current facility-administered medications on file prior to encounter.     Current Outpatient Medications on File Prior to Encounter   Medication Sig Dispense Refill    rOPINIRole HCl 3 MG Oral Tab Take 2 tablets (6 mg total) by mouth nightly.      tamsulosin (FLOMAX) 0.4 MG Oral Cap Take 1 capsule (0.4 mg total) by mouth daily for 7 days. 7 capsule 0    HYDROcodone-acetaminophen 5-325 MG Oral Tab Take 1-2 tablets by mouth every 4 (four) hours as needed for Pain. 20 tablet 0    topiramate 25 MG Oral Tab 1 tab po Qam before breakfast x 1 week, then increase to 2 tabs po Qam before breakfast. 60 tablet 3    Phentermine HCl 37.5 MG Oral Tab Take 1 tablet (37.5 mg total) by mouth every morning before breakfast. 30 tablet 3    buPROPion  MG Oral Tablet 24 Hr 2 tabs po Qam 60 tablet 3    rosuvastatin 5 MG Oral Tab Take 1 tablet (5 mg total) by mouth nightly. 90 tablet 0    Levothyroxine Sodium 300 MCG Oral Tab Take 1 tablet (300 mcg total) by mouth daily. Take levothyroxine 25mcg with this to equal 325mcg daily. 90 tablet 0    levothyroxine 25  MCG Oral Tab Take 1 tablet (25 mcg total) by mouth before breakfast. Take levothyroxine 300mcg with this to equal 325mcg daily. 90 tablet 0    ALPRAZolam 0.5 MG Oral Tab Take 1 tablet (0.5 mg total) by mouth 3 (three) times daily. (Patient taking differently: Take 1 tablet (0.5 mg total) by mouth as needed.)      Naloxone HCl 4 MG/0.1ML Nasal Liquid 4 mg by Nasal route as needed. If patient remains unresponsive, repeat dose in other nostril 2-5 minutes after first dose. 1 kit 0    Naloxone HCl 4 MG/0.1ML Nasal Liquid 4 mg by Intranasal route as needed (May repeat as needed in other nostril if symptoms persist). If patient remains unresponsive, repeat dose in other nostril 2-5 minutes after first dose. 1 kit 0

## 2025-05-01 NOTE — CONSULTS
[facility]   part of [facility]    Report of Consultation    Iliana Ryan Patient Status:  Observation    1985 MRN [id number]   Location [facility] 3SW-A Attending Demian Barth MD   Hosp Day # 0 PCP No primary care provider on file.     Date of Admission:  2025  Date of Consult:  2025    Reason for Consultation:  Left ureteral stone    History of Present Illness:  Iliana Ryan is a a(n) 39 year old female with hx of hypothyroidism, KHLOE, migraines, HTN, depression, who presented to the ED for worsening left sided abdominal pain after being diagnosed with ureteral stone. She was afebrile and hemodynamically stable at presentation. Labs showed normal white count and normal serum creatinine. Urinalysis >10 RBC/hpf. CT scan that had been completed earlier in the day showed 5-6mm left proximal ureteral stone with additional bilateral nephrolithiasis. Patient being admitted for further evaluation and mgmt. Urology consulted.    Patient ongoing pain requiring IV pain medication. One prior stone many years ago, spontaneously passed. No prior surgical intervention for stones.     History:  Past Medical History[1]  Past Surgical History[2]  Family History[3]   reports that she has never smoked. She has never used smokeless tobacco. She reports current alcohol use. She reports that she does not use drugs.    Allergies:  Allergies[4]    Medications:  Current Hospital Medications[5]    Review of Systems:  Pertinent items are noted in HPI.    Physical Exam:  /72 (BP Location: Left arm)   Pulse 76   Temp 98.1 °F (36.7 °C) (Oral)   Resp 18   Ht 5' 8\" (1.727 m)   Wt 280 lb (127 kg)   LMP 2023 (Approximate)   SpO2 96%   BMI 42.57 kg/m²   General appearance: alert, appears stated age, cooperative, and no distress  Head: Normocephalic, without obvious abnormality, atraumatic  Back:  left CVAT  Lungs: non labored respirations  Abdomen: soft, NTND  Extremities:  extremities normal, atraumatic, no cyanosis or edema  Neurologic: Grossly normal  Psych appropriate affect and mood    Laboratory Data:  Lab Results   Component Value Date    WBC 8.9 05/01/2025    HGB 10.0 05/01/2025    HCT 32.1 05/01/2025    .0 05/01/2025    CREATSERUM 0.76 05/01/2025    BUN 12 05/01/2025     05/01/2025    K 4.2 05/01/2025     05/01/2025    CO2 22.0 05/01/2025     05/01/2025    CA 9.6 05/01/2025    ALB 4.6 05/01/2025    ALKPHO 39 05/01/2025    BILT 0.3 05/01/2025    TP 7.5 05/01/2025    AST 16 05/01/2025    ALT 13 05/01/2025       Urinalysis Results (last three years):  Recent Labs     02/26/23  0836 01/13/25  1452 04/30/25  0637 05/01/25  0559   COLORUR Other* Yellow Yellow Light-Yellow   CLARITY Slightly Cloudy* Clear Slightly Cloudy* Clear   SPECGRAVITY 1.015 1.010 1.020 1.015   PHURINE 6.0 5.5 5.5 5.0   PROUR 30 mg/dL* Negative Negative Negative   GLUUR Negative Negative Negative Normal   KETUR Negative Negative Negative Negative   BILUR Negative Negative Negative Negative   BLOODURINE Large* Negative Large* 2+*   NITRITE Negative Negative Negative Negative   UROBILINOGEN 0.2 0.2 0.2 Normal   LEUUR Trace* Negative Negative Negative   WBCUR 6-10*  --  1-5 1-5   RBCUR >10*  --  >10* >10*   BACUR 1+*  --  1+* None Seen       Urine Culture Results (last three years):  Lab Results   Component Value Date    URINECUL  02/26/2023     <10,000 cfu/ml Multiple species present- probable contamination.    URINECUL  09/26/2020     10-50,000 cfu/ml Multiple species present-probable contamination.    URINECUL >100,000 CFU/ML Gram positive tony 10/02/2018    URINECUL No Growth 1 Day 03/11/2017    URINECUL 40,000 CFU/ML Mixed gram positive tony 01/22/2017    URINECUL 40,000 CFU/ML Mixed gram positive tony 07/17/2016       Imaging  CT ABDOMEN+PELVIS KIDNEYSTONE 2D RNDR(NO IV,NO ORAL)(CPT=74176)  Result Date: 4/30/2025  PROCEDURE:  CT ABDOMEN+PELVIS KIDNEYSTONE 2D RNDR(NO IV,NO  ORAL)(CPT=74176)  COMPARISON:  [place], CT, CT ABDOMEN+PELVIS(CONTRAST ONLY)(CPT=74177), 1/13/2025, 3:56 PM.  [place], CT, CT ABDOMEN+PELVIS KIDNEYSTONE 2D RNDR(NO IV,NO ORAL)(CPT=74176), 2/26/2023, 9:16 AM.  INDICATIONS:  Left low back pain - denies taking medication pta - hx kidney stones  TECHNIQUE:  Unenhanced multislice CT scanning from above the kidneys to below the urinary bladder.  2D rendering are generated on the CT scanner workstation to localize potential stones in the cranio-caudal plane.  Dose reduction techniques were used. Dose information is transmitted to the ACR (American College of Radiology) NRDR (National Radiology Data Registry) which includes the Dose Index Registry.  PATIENT STATED HISTORY: (As transcribed by Technologist)  Acute right flank pain, Hx of kidney stones.    FINDINGS:  LUNG BASES:  Lung bases are clear. LIVER:  Normal in shape and contour but limited evaluation without contrast. BILIARY:   Cholecystectomy clips.  No intrahepatic biliary dilatation.. SPLEEN:  Normal.  No enlargement or focal lesion. PANCREAS:  No mino-pancreatic inflammatory stranding ADRENALS:  Normal.  No mass or enlargement.  KIDNEYS:  Mild left-sided hydronephrosis.  There is a 5 mm proximal left ureteral calculus.  Nonobstructing bilateral renal calculi. BOWEL/MESENTERY:  Bowel is normal in caliber. No evidence of obstruction.  Postoperative changes from gastric bypass PELVIS:  Bladder is unremarkable in appearance.  Calcified phleboliths in the pelvis.   AORTA/VASCULAR:  Aorta is normal in caliber. BONES:  Degenerative changes greatest at L5-S1 level.            CONCLUSION:  1. Mild left-sided hydronephrosis with a 5 mm proximal left ureteral calculus. 2. Nonobstructing bilateral renal calculi.    LOCATION:  [id number]   Dictated by (CST): Richie Almonte MD on 4/30/2025 at 7:54 AM     Finalized by (CST): Richie Almonte MD on 4/30/2025 at 7:57 AM             Impression:  Problem List[6]    39 year old  female with hx of hypothyroidism, KHLOE, migraines, HTN, depression, who presented to the ED for worsening left sided abdominal pain after being diagnosed with ureteral stone. She was afebrile and hemodynamically stable at presentation. Labs showed normal white count and normal serum creatinine. Urinalysis >10 RBC/hpf. CT scan that had been completed earlier in the day showed 5-6mm left proximal ureteral stone with additional bilateral nephrolithiasis. Patient being admitted for further evaluation and mgmt. Urology consulted.    Surgical risks, benefits and alternatives discussed.    Risks including but not limited to infection, bleeding, anesthetic issues, need for subsequent surgery to treat stone burden, removal of stent, ureteral spasm, ureteral injury or stricture discussed with patient.     Recommendations:  Strain all urine. Remain NPO. Ancef OCTOR. Obtain consent for cystoscopy, LEFT retrograde pyelogram, left ureteral stent placement with Dr. Walker Andrade.        Thank you for allowing me to participate in the care of your patient.    Blanca Kenyon PA-C  [facility]  5/1/2025           [1]   Past Medical History:   Anxiety state    Bigeminy    Calculus of kidney    Depressive disorder    Disorder of thyroid    Dysmenorrhea    Elevated LFTs    Essential hypertension    GERD (gastroesophageal reflux disease)    HEADACHES    History of miscarriage, currently pregnant (HCC)    HYPOTHYROIDISM    Irregular menses    Low-lying placenta (HCC)    Malpositioned IUD    Mental disorder    hx of depression after ectopic pregnancy    Migraines    Painful lumpy left breast    Palpitations    PONV (postoperative nausea and vomiting)    Post partum depression    hx of after ectopic pregnancy, took meds for about 6 weeks    Pregnancy (HCC)    Pregnancy-induced hypertension (HCC)    Shortness of breath    Sleep apnea    Syncope, near    Unspecified hypothyroidism    Visual impairment    glasses   [2]   Past  Surgical History:  Procedure Laterality Date          x2    Cholecystectomy  2020    Gastric bypass,obese<100cm shana-en-y      Laparoscopic salpingostomy Right 2013    right-ectopic    Other surgical history      thyroid removed    Removal gallbladder      Thyroidectomy      nodular goiter    Total abdominal hysterectomy  2023    + ovaries, cervix removed   [3]   Family History  Problem Relation Age of Onset    Diabetes Father     Hypertension Father     Diabetes Maternal Grandfather     Cancer Maternal Grandfather         prostate    Hypertension Maternal Grandfather     Heart Disorder Maternal Grandfather     Lung Disorder Maternal Grandfather     Other (Tumor in bowel) Maternal Aunt     Breast Cancer Other         MGGM   [4] No Known Allergies  [5]   Current Facility-Administered Medications:     HYDROmorphone (Dilaudid) 1 MG/ML injection 0.5 mg, 0.5 mg, Intravenous, Q30 Min PRN    sodium chloride 0.9% infusion, , Intravenous, Continuous    ondansetron (Zofran) 4 MG/2ML injection 4 mg, 4 mg, Intravenous, Q4H PRN    rosuvastatin (Crestor) tab 5 mg, 5 mg, Oral, Nightly    levothyroxine (Synthroid) tab 300 mcg, 300 mcg, Oral, Daily    levothyroxine (Synthroid) tab 25 mcg, 25 mcg, Oral, Before breakfast    topiramate (TopaMAX) tab 50 mg, 50 mg, Oral, Daily    buPROPion ER (Wellbutrin XL) 24 hr tab 150 mg, 150 mg, Oral, Daily    tamsulosin (Flomax) cap 0.4 mg, 0.4 mg, Oral, Daily    ALPRAZolam (Xanax) tab 0.5 mg, 0.5 mg, Oral, TID PRN    rOPINIRole (Requip) tab 6 mg, 6 mg, Oral, Nightly    acetaminophen (Tylenol Extra Strength) tab 1,000 mg, 1,000 mg, Oral, Q4H PRN    melatonin tab 3 mg, 3 mg, Oral, Nightly PRN    glycerin-hypromellose- (Artificial Tears) 0.2-0.2-1 % ophthalmic solution 1 drop, 1 drop, Both Eyes, QID PRN    sodium chloride (Saline Mist) 0.65 % nasal solution 1 spray, 1 spray, Each Nare, Q3H PRN    polyethylene glycol (PEG 3350) (Miralax) 17 g oral packet 17 g, 17  g, Oral, Daily PRN    sennosides (Senokot) tab 17.2 mg, 17.2 mg, Oral, Nightly PRN    bisacodyl (Dulcolax) 10 MG rectal suppository 10 mg, 10 mg, Rectal, Daily PRN    fleet enema (Fleet) rectal enema 133 mL, 1 enema, Rectal, Once PRN    morphINE PF 2 MG/ML injection 1 mg, 1 mg, Intravenous, Q2H PRN **OR** morphINE PF 2 MG/ML injection 2 mg, 2 mg, Intravenous, Q2H PRN **OR** morphINE PF 4 MG/ML injection 4 mg, 4 mg, Intravenous, Q2H PRN    acetaminophen (Tylenol) tab 650 mg, 650 mg, Oral, Q4H PRN **OR** HYDROcodone-acetaminophen (Norco) 5-325 MG per tab 1 tablet, 1 tablet, Oral, Q4H PRN **OR** HYDROcodone-acetaminophen (Norco) 5-325 MG per tab 2 tablet, 2 tablet, Oral, Q4H PRN    ondansetron (Zofran) 4 MG/2ML injection 4 mg, 4 mg, Intravenous, Q6H PRN    prochlorperazine (Compazine) 10 MG/2ML injection 5 mg, 5 mg, Intravenous, Q8H PRN  [6]   Patient Active Problem List  Diagnosis    Hypothyroid    PVC (premature ventricular contraction)    Primary hypertension    Mild anxiety    HANNA (nonalcoholic steatohepatitis)    KHLOE (obstructive sleep apnea)    RLS (restless legs syndrome)    Intractable abdominal pain    Kidney stone

## 2025-05-01 NOTE — ED INITIAL ASSESSMENT (HPI)
Patient here with c/o pain due to 5mm kidney stone.  Patient was seen at the PED yesterday and dx with kidney stone, prescribed Norco.  Patient took norco around 2AM and reports pain is getting worse in her back.

## 2025-05-01 NOTE — PLAN OF CARE
Alert and oriented x 4. Vitals stable on room air. Pain managed on PRN medications.  Voiding without difficulty, straining urine. Last BM 04/30. NPO. SCD's on bilaterally. Skin intact, checked with ROSCOE Montanez. Safety precautions in place. Plan for cystoscopy tonight. Plan of care discussed with patient.

## 2025-05-01 NOTE — ED PROVIDER NOTES
Patient Seen in: [facility] Emergency Department      History     Chief Complaint   Patient presents with    Abdomen/Flank Pain     Stated Complaint: C/O kidney stones was seen in [place] earlier yesterday. was give meds and s*    Subjective:   HPI    Patient is a 39-year-old female with history of kidney stones in the past patient presents emergency room with a history of a 5 mm proximal left-sided kidney stone that was diagnosed yesterday.  The patient discharged home with pain medication.  The patient states she has worsening symptoms at this time.  The patient's pain is well localized to the left side of the abdomen at this time.  The patient denies history of any fever.  Patient took Norco at home without improvement.  The patient denies history of any other somatic complaints or discomfort at this time.  Patient with nausea associated with symptoms.  History of Present Illness               Objective:     Past Medical History:    Anxiety state    Bigeminy    Calculus of kidney    Depressive disorder    Disorder of thyroid    Dysmenorrhea    Elevated LFTs    Essential hypertension    GERD (gastroesophageal reflux disease)    HEADACHES    History of miscarriage, currently pregnant (HCC)    HYPOTHYROIDISM    Irregular menses    Low-lying placenta (HCC)    Malpositioned IUD    Mental disorder    hx of depression after ectopic pregnancy    Migraines    Painful lumpy left breast    Palpitations    PONV (postoperative nausea and vomiting)    Post partum depression    hx of after ectopic pregnancy, took meds for about 6 weeks    Pregnancy (HCC)    Pregnancy-induced hypertension (HCC)    Shortness of breath    Sleep apnea    Syncope, near    Unspecified hypothyroidism    Visual impairment    glasses              Past Surgical History:   Procedure Laterality Date          x2    Cholecystectomy  2020    Gastric bypass,obese<100cm shana-en-y      Laparoscopic salpingostomy Right      right-ectopic    Other surgical history  2005    thyroid removed    Removal gallbladder      Thyroidectomy  2005    nodular goiter    Total abdominal hysterectomy  12/2023    + ovaries, cervix removed                Social History     Socioeconomic History    Marital status:    Tobacco Use    Smoking status: Never    Smokeless tobacco: Never   Vaping Use    Vaping status: Never Used   Substance and Sexual Activity    Alcohol use: Yes     Comment: once a month    Drug use: No    Sexual activity: Yes     Partners: Male                                Physical Exam     ED Triage Vitals [05/01/25 0400]   BP (!) 149/93   Pulse 104   Resp 20   Temp 98.2 °F (36.8 °C)   Temp src Temporal   SpO2 98 %   O2 Device None (Room air)       Current Vitals:   Vital Signs  BP: (!) 151/92  Pulse: 86  Resp: 22  Temp: 98.2 °F (36.8 °C)  Temp src: Temporal  MAP (mmHg): (!) 108    Oxygen Therapy  SpO2: 100 %  O2 Device: None (Room air)        Physical Exam     Physical Exam         GENERAL: Well-developed, well-nourished female sitting up breathing easily in mild distress secondary to pain.  Patient is nontoxic in appearance.  HEENT: Head is normocephalic, atraumatic. Pupils are 4 mm equally round and reactive to light. Oropharynx is clear. Mucous membranes are moist.  LUNGS: Clear to auscultation bilaterally with no wheeze. There is good equal air entry bilaterally.  HEART: Regular rate and rhythm. Normal S1, S2 no S3, or S4. No murmur.  ABDOMEN: There is no focal tenderness to palpation appreciated anywhere throughout the abdomen. There is no guarding, no rebound, no mass, and no organomegaly appreciated. There is normoactive bowel sounds.   EXTREMITIES: There is no cyanosis, clubbing, or edema appreciated. Pulses are 2+ and equal in all 4 extremities.  NEURO: Patient is awake, alert and oriented to time place and person. Motor strength is 5 over 5 in all 4 extremities. There are no gross motor or sensory deficits appreciated.   Patient answering all questions appropriately.          ED Course     Labs Reviewed   COMP METABOLIC PANEL (14) - Abnormal; Notable for the following components:       Result Value    Glucose 130 (*)     All other components within normal limits   CBC WITH DIFFERENTIAL WITH PLATELET - Abnormal; Notable for the following components:    HGB 10.0 (*)     HCT 32.1 (*)     MCV 73.8 (*)     MCH 23.0 (*)     All other components within normal limits   URINALYSIS WITH CULTURE REFLEX - Abnormal; Notable for the following components:    Blood Urine 2+ (*)     RBC Urine >10 (*)     Squamous Epi. Cells Few (*)     All other components within normal limits   RAINBOW DRAW LAVENDER   RAINBOW DRAW LIGHT GREEN          Results                                 MDM        7:16 patient sitting back in breathing easily no apparent distress this time.  The patient appears comfortable at this time.  Patient awaiting admission for intractable pain at home secondary to kidney stone.  Patient's case has been discussed with Dr. Duenas as well as Dr. Andrade, neither of which wanted additional imaging to be done at this time.  The patient will be admitted for further care.    Admission disposition: 5/1/2025  6:33 AM           Medical Decision Making  Patient had IV line established blood work drawn including a CBC, chemistries, BUN/creatinine, and blood sugar all of which are unremarkable.  Urinalysis shows evidence of some blood otherwise unremarkable.  Patient sitting back and breathing easily in no apparent distress on repeat examination after medication was given.  The patient did appear uncomfortable prior to medication being given.  Patient given several doses of pain medication here in the ER did have some improvement while here in the ER.  The patient had no further new complaints at this time.  The patient has failed outpatient treatment and evidence of a 5 mm proximal stone on CT scan done yesterday.  The patient will be admitted to  the hospital for intractable pain will be kept n.p.o. at this time.  I did discuss the case with [facility] hospitalist as well as [facility] who personally reviewed the patient's workup and did not feel the need for additional imaging to be done at this time.  The patient will be admitted for intractable pain kept n.p.o. at this time.    Disposition and Plan     Clinical Impression:  1. Intractable abdominal pain    2. Kidney stone         Disposition:  Admit  5/1/2025  6:33 am    Follow-up:  No follow-up provider specified.        Medications Prescribed:  Current Discharge Medication List          Supplementary Documentation:         Hospital Problems       Present on Admission  Date Reviewed: 4/20/2025          ICD-10-CM Noted POA    * (Principal) Intractable abdominal pain R10.9 5/1/2025 Unknown

## 2025-05-02 VITALS
HEART RATE: 82 BPM | RESPIRATION RATE: 18 BRPM | WEIGHT: 280 LBS | DIASTOLIC BLOOD PRESSURE: 82 MMHG | HEIGHT: 68 IN | SYSTOLIC BLOOD PRESSURE: 140 MMHG | OXYGEN SATURATION: 98 % | TEMPERATURE: 98 F | BODY MASS INDEX: 42.44 KG/M2

## 2025-05-02 LAB
ANION GAP SERPL CALC-SCNC: 9 MMOL/L (ref 0–18)
BASOPHILS # BLD AUTO: 0.01 X10(3) UL (ref 0–0.2)
BASOPHILS NFR BLD AUTO: 0.1 %
BUN BLD-MCNC: 8 MG/DL (ref 9–23)
CALCIUM BLD-MCNC: 9.3 MG/DL (ref 8.7–10.6)
CHLORIDE SERPL-SCNC: 108 MMOL/L (ref 98–112)
CO2 SERPL-SCNC: 21 MMOL/L (ref 21–32)
CREAT BLD-MCNC: 0.74 MG/DL (ref 0.55–1.02)
EGFRCR SERPLBLD CKD-EPI 2021: 105 ML/MIN/1.73M2 (ref 60–?)
EOSINOPHIL # BLD AUTO: 0 X10(3) UL (ref 0–0.7)
EOSINOPHIL NFR BLD AUTO: 0 %
ERYTHROCYTE [DISTWIDTH] IN BLOOD BY AUTOMATED COUNT: 14.6 %
GLUCOSE BLD-MCNC: 137 MG/DL (ref 70–99)
HCT VFR BLD AUTO: 31.5 % (ref 35–48)
HGB BLD-MCNC: 9.9 G/DL (ref 12–16)
IMM GRANULOCYTES # BLD AUTO: 0.07 X10(3) UL (ref 0–1)
IMM GRANULOCYTES NFR BLD: 0.8 %
LYMPHOCYTES # BLD AUTO: 0.74 X10(3) UL (ref 1–4)
LYMPHOCYTES NFR BLD AUTO: 8.4 %
MAGNESIUM SERPL-MCNC: 2 MG/DL (ref 1.6–2.6)
MCH RBC QN AUTO: 23.5 PG (ref 26–34)
MCHC RBC AUTO-ENTMCNC: 31.4 G/DL (ref 31–37)
MCV RBC AUTO: 74.6 FL (ref 80–100)
MONOCYTES # BLD AUTO: 0.12 X10(3) UL (ref 0.1–1)
MONOCYTES NFR BLD AUTO: 1.4 %
NEUTROPHILS # BLD AUTO: 7.85 X10 (3) UL (ref 1.5–7.7)
NEUTROPHILS # BLD AUTO: 7.85 X10(3) UL (ref 1.5–7.7)
NEUTROPHILS NFR BLD AUTO: 89.3 %
OSMOLALITY SERPL CALC.SUM OF ELEC: 286 MOSM/KG (ref 275–295)
PLATELET # BLD AUTO: 301 10(3)UL (ref 150–450)
POTASSIUM SERPL-SCNC: 4.4 MMOL/L (ref 3.5–5.1)
RBC # BLD AUTO: 4.22 X10(6)UL (ref 3.8–5.3)
SODIUM SERPL-SCNC: 138 MMOL/L (ref 136–145)
WBC # BLD AUTO: 8.8 X10(3) UL (ref 4–11)

## 2025-05-02 PROCEDURE — 99231 SBSQ HOSP IP/OBS SF/LOW 25: CPT | Performed by: PHYSICIAN ASSISTANT

## 2025-05-02 PROCEDURE — 99239 HOSP IP/OBS DSCHRG MGMT >30: CPT | Performed by: HOSPITALIST

## 2025-05-02 RX ORDER — ALPRAZOLAM 0.5 MG
0.5 TABLET ORAL AS NEEDED
Status: SHIPPED | COMMUNITY
Start: 2025-05-02

## 2025-05-02 NOTE — OPERATIVE REPORT
Urology Operative Note    Attending Surgeon: Walker Andrade MD    Assistant Surgeon: None    Patient Name: Iliana Ryan    Date of Surgery: 5/1/2025    Preoperative Diagnosis: LEFT ureteral obstruction    Postoperative Diagnosis: Same    Procedure Performed: Cystoscopy, LEFT retrograde pyelogram and ureteral stent placement     Indication:  Patient is a 39 year old female who presented with LEFT ureteral stone and additional kidney stones. The patient was counseled on options, risks, and benefits and elected to undergo the above procedure. We discussed risks including, but not limited to, bleeding, infection, damage to surrounding structures, need for repeat procedure(s) (including inability to place a stent and need for nephrostomy tube). The patient understood these risks and wished to proceed with surgery.    Findings:  Normal urethra. No bladder lesions. Left RPG with mild hydronephrosis. Left ureteral stent (6x26) placed without issue. No significant cloudy/purulent urine noted.     Procedure:  The patient was taken to the operating room and a timeout was performed confirming the correct patient and procedure. The patient was prepped and draped in lithotomy position after undergoing general anesthesia. Pre-operative prophylactic antibiotics were given in the form of Ancef.    The cystoscope was inserted per urethra and the bladder was inspected and drained. The LEFT ureter was cannulated with a 5-Citizen of [place] Tigert[place] catheter which was passed into the distal ureter under fluoroscopy. A retrograde pyelogram was performed with contrast and showed mild hydronephrosis. I then placed wire into the catheter and into the kidney. The open ended catheter was then removed leaving the wire in place.      A 6-Citizen of [place] x 26 cm JJ ureteral stent was inserted over the wire. The proximal coil was formed in the kidney under fluoroscopic guidance. The distal coil was formed within the bladder under direct cystoscopic  visualization.   The stent string was removed prior to stent placement.    The bladder was drained and the cystoscope was removed. The patient was awoken from anesthesia and transferred to PACU in stable condition. The patient tolerated the procedure well. All instrument/supply counts were correct at the end of the case.    Specimens:   None     Estimated Blood Loss:  1 mL    Tubes/Drains:  6-[place] x 26 cm JJ LEFT ureteral stent    Complications:   None immediate    Condition from OR:  Stable    Plan:   Return to floor  General diet  Ok to CO home today if stable- urology medications signed   Return in a few weeks for definitive stone procedure  Abx x3 days        Walker Andrade MD  Staff Urologist  [facility]  Office: [phone]

## 2025-05-02 NOTE — ANESTHESIA POSTPROCEDURE EVALUATION
[facility]    Iliana Ryan Patient Status:  Observation   Age/Gender 39 year old female MRN [id number]   Location [facility] SURGERY Attending Demian Barth MD   Hosp Day # 0 PCP No primary care provider on file.       Anesthesia Post-op Note    CYSTOSCOPY, LEFT RETROGRADE, INSERTION LEFT URETERAL STENT    Procedure Summary       Date: 05/01/25 Room / Location:  MAIN OR 07 /  MAIN OR    Anesthesia Start: 2042 Anesthesia Stop: 2112    Procedure: CYSTOSCOPY, LEFT RETROGRADE, INSERTION LEFT URETERAL STENT (Left: Ureter) Diagnosis:       Ureteral calculi      (Ureteral calculi [N20.1])    Surgeons: Walker Andrade MD Anesthesiologist: Paulie Ch MD    Anesthesia Type: general ASA Status: 3            Anesthesia Type: general    Vitals Value Taken Time   /73 05/01/25 21:12   Temp 98.9 °F (37.2 °C) 05/01/25 21:12   Pulse 97 05/01/25 21:12   Resp 18 05/01/25 21:12   SpO2 98 % 05/01/25 21:12           Patient Location: PACU    Anesthesia Type: general    Airway Patency: patent    Postop Pain Control: adequate    Mental Status: mildly sedated but able to meaningfully participate in the post-anesthesia evaluation    Nausea/Vomiting: none    Cardiopulmonary/Hydration status: stable euvolemic    Complications: no apparent anesthesia related complications    Postop vital signs: stable    Comments: The endotracheal airway was removed in the procedure area.  Cable monitors were removed, and the patient was transported with observation to the recovery area personally with the OR team.  The patient was responsive in a meaningful way and demonstrated a good airway.  PACU monitors were then applied with device connection to Epic.  Full report signout, including report, identifications, history, procedure, anesthesia course, recovery expectations with chance for questions was provided to a responsible recovery RN.        Dental Exam: Unchanged from Preop    Patient to be discharged from PACU when  criteria met.      Report to ALEKSANDRA Leggett.

## 2025-05-02 NOTE — ANESTHESIA PROCEDURE NOTES
Airway  Date/Time: 5/1/2025 8:48 PM  Reason: elective    Airway not difficult    General Information and Staff   Patient location during procedure: OR  Anesthesiologist: Paulie Ch MD  Performed: anesthesiologist   Performed by: Paulie Ch MD  Authorized by: Paulie Ch MD        Indications and Patient Condition  Indications for airway management: anesthesia  Sedation level: deep      Preoxygenated: yesPatient position: sniffing    Mask difficulty assessment: 0 - not attempted    Final Airway Details    Final airway type: endotracheal airway    Successful airway: ETT  Cuffed: yes   Successful intubation technique: direct laryngoscopy  Endotracheal tube insertion site: oral  Blade: GlideScope  Blade size: #4  ETT size (mm): 7.5    Cormack-Lehane Classification: grade I - full view of glottis  Placement verified by: capnometry   Measured from: lips  ETT to lips (cm): 21  Number of attempts at approach: 1    Additional Comments   OETT placed without airway or dental trauma.

## 2025-05-02 NOTE — TELEPHONE ENCOUNTER
Spoke with pt, had recent surgery with Dr Andrade and requires repeat surgery for stent removal.  Per pt, not able to come in at the next available date for Dr Andrade which is June 10th due to a cruise trip pt has planned.  Message sent to MD to further advise if surgery can be scheduled sooner.      Per MD will review schedule and advise what date will be available for the surgery.  Pt made aware and verbalized understanding.

## 2025-05-02 NOTE — DISCHARGE INSTRUCTIONS
Do not take narcotics with ativan or any other sedatives      You had cystoscopy and stent placement in the operating room today.    Instructions:    - No heavy lifting or strenuous activity for 1 day. You may resume regular activity tomorrow.    - The office will contact you to make your next surgery appointment in a few days. If you do not hear from the clinic after a few days or you need to change your appointment time or date, please contact us at [phone].    -  You have a stent (small plastic tube) inside your kidney and ureter to allow the swelling from surgery to resolve. This is only temporary and must be removed, so you should not forget about it. We will remove your stent via a brief cystoscopy in clinic AFTER your next surgery.     - Take the antibiotics as prescribed      - With a ureteral stent in place you may have some discomfort on your side/flank. You can feel pain worsen when you urinate, so try to avoid holding urine and void every 2-3 hours. You also may notice increased blood in the urine as you increase your activity. If this happens increase your hydration and take it easy for a day. Warm baths/hot packs can help with the discomfort as well as your prescribed medications and Advil/Motrin (if you are able to take these).     - You may experience mild pain after the procedure for a few days.  If the pain becomes intolerable please contact our office or go to the nearest Emergency Room or Urgent Care. You should take over the counter ibuprofen (AKA motrin, advil) for mild pain (provided you do not have a medical condition such as stomach ulcers or kidney disease which prohibits you from taking these). You may alternate this with tylenol as well. If pain is still not relieved by tylenol and/or ibuprofen, you may take narcotic pain medication if prescribed (typically oxycodone or tramadol). If you are taking narcotic pain medication this can make you constipated, so you should take over the  counter stool softeners or miralax if prescribed.    - Warm pack or hot baths often help with discomfort after cystoscopy.    - If you take blood thinners (such as aspirin or plavix) please hold these medications until 3 days after surgery.     - You may experience burning and frequency of urination over the next few days. This will improve after a few days if you stay well hydrated. If you were prescribed phenazopyridine (Pyridium) this may relieve urinary discomfort but you can only take this for 3 days. Pyridium will make your urine orange.     - You are likely to see some blood in your urine (pink or light red urine) that should clear up within a few days. Staying well hydrated should help this clear up. If you notice the urine stays dark red or there are multiple large blood clots despite good hydration, please call the urology clinic ([phone]).     - Try to abstain from alcohol, coffee, tea, artificial sweeteners, and spicy food for the next 48 hours as these can irritate the bladder.     - If you develop fevers / chills, difficulty urinating, or abdominal pain that does not improve with pain medications, please call the office.     - Drink 1.5 to 2 liters of fluid today (water is preferable). If you are on a fluid restriction due to other medical reasons then you need to adhere to your fluid restriction recommendations.      Walker Andrade MD  Staff Urologist  [facility]  Office: [phone]

## 2025-05-02 NOTE — ANESTHESIA PREPROCEDURE EVALUATION
PRE-OP EVALUATION    Patient Name: Iliana Ryan    Admit Diagnosis: Kidney stone [N20.0]  Intractable abdominal pain [R10.9]    Pre-op Diagnosis: Ureteral calculi [N20.1]    CYSTOSCOPY, LEFT RETROGRADE, INSERTION LEFT URETERAL STENT    Anesthesia Procedure: CYSTOSCOPY, LEFT RETROGRADE, INSERTION LEFT URETERAL STENT (Left)    Surgeons and Role:     * Walker Andrade MD - Primary    Pre-op vitals reviewed.  Temp: 98.2 °F (36.8 °C)  Pulse: 75  Resp: 18  BP: 133/80  SpO2: 100 %  Body mass index is 42.57 kg/m².    Current medications reviewed.  Hospital Medications:  Current Medications[1]    Outpatient Medications:   Prescriptions Prior to Admission[2]    Allergies: Patient has no known allergies.      Anesthesia Evaluation    Patient summary reviewed.    Anesthetic Complications  (+) history of anesthetic complications (difficult airway flag, but records show uneventful with glidescope.)  History of: difficult airway and PONV       GI/Hepatic/Renal      (+) GERD          (+) liver disease (HANNA)                 Cardiovascular    Negative cardiovascular ROS.    Exercise tolerance: good     MET: >4    (+) obesity  (+) hypertension                  (+) dysrhythmias and PVC                  Endo/Other           (+) hypothyroidism                       Pulmonary               (+) shortness of breath     (+) sleep apnea and noncompliant      Neuro/Psych                              As per Epic:  Patient Active Problem List:     Hypothyroid     PVC (premature ventricular contraction)     Primary hypertension     Mild anxiety     HANNA (nonalcoholic steatohepatitis)     KHLOE (obstructive sleep apnea)     RLS (restless legs syndrome)     Intractable abdominal pain     Kidney stone          Past Surgical History[3]  Social Hx on file[4]  History   Drug Use No     Available pre-op labs reviewed.  Lab Results   Component Value Date    WBC 8.9 05/01/2025    RBC 4.35 05/01/2025    HGB 10.0 (L) 05/01/2025    HCT 32.1 (L)  05/01/2025    MCV 73.8 (L) 05/01/2025    MCH 23.0 (L) 05/01/2025    MCHC 31.2 05/01/2025    RDW 14.8 05/01/2025    .0 05/01/2025     Lab Results   Component Value Date     05/01/2025    K 4.2 05/01/2025     05/01/2025    CO2 22.0 05/01/2025    BUN 12 05/01/2025    CREATSERUM 0.76 05/01/2025     (H) 05/01/2025    CA 9.6 05/01/2025            Airway      Mallampati: III  Mouth opening: >3 FB  TM distance: > 6 cm  Neck ROM: full Cardiovascular      Rhythm: regular  Rate: normal  (-) murmur   Dental  Comment: Dentition is grossly intact;  Patient does not demonstrate loose teeth to inspection.           Pulmonary  Comment: Unlabored ventilatory effort, no retractions.  Pulmonary exam normal.  Breath sounds clear to auscultation bilaterally.               Other findings              ASA: 3   Plan: general  NPO status verified and patient meets guidelines.        Comment: I explained intrinsic risks of general anesthesia, including nausea, dental damage, sore throat, mouth injury,and hoarseness from airway management.  All questions were answered and understanding was demonstrated of risks.  Informed permission was obtained to proceed as documented in the signed consent form.      Patient overall has increased anesthesia risks secondary to current medical conditions that qualify for ASA status III.  As documented in the informed consent, risks have been accepted.    Plan/risks discussed with: patient and spouse                Present on Admission:  **None**             [1]    HYDROmorphone (Dilaudid) 1 MG/ML injection 0.5 mg  0.5 mg Intravenous Q30 Min PRN    [COMPLETED] ondansetron (Zofran) 4 MG/2ML injection 4 mg  4 mg Intravenous Once    [COMPLETED] sodium chloride 0.9 % IV bolus 1,000 mL  1,000 mL Intravenous Once    [COMPLETED] ketorolac (Toradol) 30 MG/ML injection 30 mg  30 mg Intravenous Once    [COMPLETED] HYDROmorphone (Dilaudid) 1 MG/ML injection 0.5 mg  0.5 mg Intravenous Once     [] sodium chloride 0.9% infusion   Intravenous Continuous    [] ondansetron (Zofran) 4 MG/2ML injection 4 mg  4 mg Intravenous Q4H PRN    [COMPLETED] sodium chloride 0.9% infusion 1,000 mL  1,000 mL Intravenous Once    rosuvastatin (Crestor) tab 5 mg  5 mg Oral Nightly    levothyroxine (Synthroid) tab 300 mcg  300 mcg Oral Daily    levothyroxine (Synthroid) tab 25 mcg  25 mcg Oral Before breakfast    topiramate (TopaMAX) tab 50 mg  50 mg Oral Daily    buPROPion ER (Wellbutrin XL) 24 hr tab 150 mg  150 mg Oral Daily    tamsulosin (Flomax) cap 0.4 mg  0.4 mg Oral Daily    ALPRAZolam (Xanax) tab 0.5 mg  0.5 mg Oral TID PRN    rOPINIRole (Requip) tab 6 mg  6 mg Oral Nightly    acetaminophen (Tylenol Extra Strength) tab 1,000 mg  1,000 mg Oral Q4H PRN    melatonin tab 3 mg  3 mg Oral Nightly PRN    glycerin-hypromellose- (Artificial Tears) 0.2-0.2-1 % ophthalmic solution 1 drop  1 drop Both Eyes QID PRN    sodium chloride (Saline Mist) 0.65 % nasal solution 1 spray  1 spray Each Nare Q3H PRN    polyethylene glycol (PEG 3350) (Miralax) 17 g oral packet 17 g  17 g Oral Daily PRN    sennosides (Senokot) tab 17.2 mg  17.2 mg Oral Nightly PRN    bisacodyl (Dulcolax) 10 MG rectal suppository 10 mg  10 mg Rectal Daily PRN    fleet enema (Fleet) rectal enema 133 mL  1 enema Rectal Once PRN    morphINE PF 2 MG/ML injection 1 mg  1 mg Intravenous Q2H PRN    Or    morphINE PF 2 MG/ML injection 2 mg  2 mg Intravenous Q2H PRN    Or    morphINE PF 4 MG/ML injection 4 mg  4 mg Intravenous Q2H PRN    acetaminophen (Tylenol) tab 650 mg  650 mg Oral Q4H PRN    Or    HYDROcodone-acetaminophen (Norco) 5-325 MG per tab 1 tablet  1 tablet Oral Q4H PRN    Or    HYDROcodone-acetaminophen (Norco) 5-325 MG per tab 2 tablet  2 tablet Oral Q4H PRN    ondansetron (Zofran) 4 MG/2ML injection 4 mg  4 mg Intravenous Q6H PRN    prochlorperazine (Compazine) 10 MG/2ML injection 5 mg  5 mg Intravenous Q8H PRN    [START ON 2025]  ceFAZolin (Ancef) 2g in 10mL IV syringe premix  2 g Intravenous On Call to OR   [2]   Medications Prior to Admission   Medication Sig Dispense Refill Last Dose/Taking    rOPINIRole HCl 3 MG Oral Tab Take 2 tablets (6 mg total) by mouth nightly.   4/30/2025 Bedtime    tamsulosin (FLOMAX) 0.4 MG Oral Cap Take 1 capsule (0.4 mg total) by mouth daily for 7 days. 7 capsule 0 4/30/2025 Morning    HYDROcodone-acetaminophen 5-325 MG Oral Tab Take 1-2 tablets by mouth every 4 (four) hours as needed for Pain. 20 tablet 0 5/1/2025 at  2:00 AM    topiramate 25 MG Oral Tab 1 tab po Qam before breakfast x 1 week, then increase to 2 tabs po Qam before breakfast. 60 tablet 3 4/30/2025 Morning    Phentermine HCl 37.5 MG Oral Tab Take 1 tablet (37.5 mg total) by mouth every morning before breakfast. 30 tablet 3 4/30/2025 Morning    buPROPion  MG Oral Tablet 24 Hr 2 tabs po Qam 60 tablet 3 4/30/2025 Morning    rosuvastatin 5 MG Oral Tab Take 1 tablet (5 mg total) by mouth nightly. 90 tablet 0 4/30/2025 Evening    Levothyroxine Sodium 300 MCG Oral Tab Take 1 tablet (300 mcg total) by mouth daily. Take levothyroxine 25mcg with this to equal 325mcg daily. 90 tablet 0 4/30/2025 Morning    levothyroxine 25 MCG Oral Tab Take 1 tablet (25 mcg total) by mouth before breakfast. Take levothyroxine 300mcg with this to equal 325mcg daily. 90 tablet 0 4/30/2025 Morning    ALPRAZolam 0.5 MG Oral Tab Take 1 tablet (0.5 mg total) by mouth 3 (three) times daily. (Patient taking differently: Take 1 tablet (0.5 mg total) by mouth as needed.)   More than a month    Naloxone HCl 4 MG/0.1ML Nasal Liquid 4 mg by Nasal route as needed. If patient remains unresponsive, repeat dose in other nostril 2-5 minutes after first dose. 1 kit 0     Naloxone HCl 4 MG/0.1ML Nasal Liquid 4 mg by Intranasal route as needed (May repeat as needed in other nostril if symptoms persist). If patient remains unresponsive, repeat dose in other nostril 2-5 minutes after first  dose. 1 kit 0    [3]   Past Surgical History:  Procedure Laterality Date          x2    Cholecystectomy  2020    Gastric bypass,obese<100cm shana-en-y      Laparoscopic salpingostomy Right 2013    right-ectopic    Other surgical history  2005    thyroid removed    Removal gallbladder      Thyroidectomy  2005    nodular goiter    Total abdominal hysterectomy  2023    + ovaries, cervix removed   [4]   Social History  Socioeconomic History    Marital status:    Tobacco Use    Smoking status: Never    Smokeless tobacco: Never   Vaping Use    Vaping status: Never Used   Substance and Sexual Activity    Alcohol use: Yes     Comment: once a month    Drug use: No    Sexual activity: Yes     Partners: Male

## 2025-05-02 NOTE — PLAN OF CARE
Alert and oriented x 4. Vitals stable on room air. Pain managed on PO medications. States burning sensation when voiding.Last BM 04/30. Tolerating regular diet. SCD's on bilaterally. Safety precautions in place. Plan for discharge when cleared by hospitalist. Plan of care discussed with patient.

## 2025-05-02 NOTE — PROGRESS NOTES
Discharge AVS reviewed with patient. All questions answered. IV removed. Bedside belongings packed up by patient. Scripts sent to patient pharmacy. Waiting for her raid.   Please return immediately to the Emergency Room for re-evaluation if you are not improving, develop any new symptoms, or develop worsening of current symptoms! If you have been prescribed a medication and are unable to take this medication for any reason, please return to the Emergency Department for further evaluation! If you have been referred for follow-up to a specialist, but are unable to follow-up and your symptoms are either not improving or are worsening, please return to the Emergency Department for further evaluation!

## 2025-05-02 NOTE — TELEPHONE ENCOUNTER
Hi,    Can you please schedule this patient for surgery.    Also, can you arrange for the patient to drop a urine sample for urine culture 1-2 weeks prior to the scheduled surgery date? I placed the order.       Thanks,  Walker       Urology Surgery Request  Surgeon: Walker Andrade  Location (if known): [facility]  Procedure: Cystoscopy, LEFT ureteroscopy, laser lithotripsy, stent EXCHANGE  Anesthesia: General   Time Frame: Next available (2-4 weeks)  Time required: 60 minutes  Diagnosis: Nephrolithiasis  Special Equipment: C-arm    Antibiotics: per hospital protocol unless checked below   ___ Levaquin 500 mg IV   ___ Gemcitabine 2 g/100 mL NS bladder instillation to be given in OR    Estimated Post Op/Follow Up Appt:   1 week for cystoscopy/stent removal in clinic with me. Please schedule appointment at time of surgery scheduling.

## 2025-05-02 NOTE — PROGRESS NOTES
[facility]   part of [facility]    Progress Note    Iliana Ryan Patient Status:  Observation    1985 MRN [id number]   Location [facility] 3SW-A Attending Demian Barth MD   Hosp Day # 0 PCP No primary care provider on file.     Subjective:   Iliana Ryan is a(n) 39 year old female s/p Cystoscopy, LEFT retrograde pyelogram and ureteral stent placement 2025.    Some pain this morning, resolved with [place].  Voiding freely, tolerating diet.    Objective:   Blood pressure 144/78, pulse 89, temperature 98.2 °F (36.8 °C), temperature source Oral, resp. rate 18, height 5' 8\" (1.727 m), weight 280 lb (127 kg), last menstrual period 2023, SpO2 96%, unknown if currently breastfeeding.    No distress  Non labored respirations  Abdomen soft  No CVAT    Results:   Lab Results   Component Value Date    WBC 8.8 2025    HGB 9.9 (L) 2025    HCT 31.5 (L) 2025    .0 2025    CREATSERUM 0.74 2025    BUN 8 (L) 2025     2025    K 4.4 2025     2025    CO2 21.0 2025     (H) 2025    CA 9.3 2025    ALB 4.6 2025    ALKPHO 39 2025    BILT 0.3 2025    TP 7.5 2025    AST 16 2025    ALT 13 2025    T4F 0.8 2025    TSH 46.109 (H) 2025     2023    LIP 45 2023    MG 2.0 2025    TROPHS 5 2023    RPR Nonreactive 2016    B12 438 2025       XR OR - N/C  Result Date: 2025  CONCLUSION: Intraoperative images for operative control.  LOCATION:  [place]    Dictated by (CST): Richie Almonte MD on 2025 at 10:20 PM     Finalized by (CST): Richie Almonte MD on 2025 at 10:21 PM       CT ABDOMEN+PELVIS KIDNEYSTONE 2D RNDR(NO IV,NO ORAL)(CPT=74176)  Result Date: 2025  CONCLUSION:  1. Mild left-sided hydronephrosis with a 5 mm proximal left ureteral calculus. 2. Nonobstructing bilateral renal calculi.     LOCATION:  [id number]   Dictated by (CST): Richie Almonte MD on 4/30/2025 at 7:54 AM     Finalized by (CST): Richie Almonte MD on 4/30/2025 at 7:57 AM             Assessment & Plan:   Left ureteral stone  Bilateral nephrolithiasis  S/p Cystoscopy, LEFT retrograde pyelogram and ureteral stent placement 5/1/2025  AF, HD stable  Labs stable  Good UOP  Pain controlled with PO medications    PLAN: Ok to d'c from  standpoint. Outpatient ureteroscopy for treatment of left ureteral stone and left renal stone burden.    Blanca Kenyon PA-C  [facility]  5/2/2025

## 2025-05-02 NOTE — PLAN OF CARE
Alert and Oriented x4. On RA. VSS. Denies pain at this time. Denies N/T. Voiding freely. Tolerating diet. Denies N/V. Call light within reach at this time.  Patient returned from PACU ~2200, patient stable, denies any pain, no incisions, patient comfortable at this time    Plan: Home when cleared

## 2025-05-03 NOTE — DISCHARGE SUMMARY
[facility]     Iliana Ryan Patient Status:  Observation    1985 MRN [id number]   Location [facility] 3SW-A Attending No att. providers found   Hosp Day # 0 PCP No primary care provider on file.     Date of Admission:  2025  Date of Discharge:   2025    Discharge Disposition: Home or Self Care    Discharge Diagnosis:    #Renal colic   #Left nephrolithiasis with mild hydronephrosis   #Hypothyroid   #HLD    #Migraines  #Anxiety    History of Present Illness:    Iliana Ryan is a 39 year old female with a past medical history of HTN, GERD, hypothyroid, KHLOE. She comes to the ED due to left flank pain.      Brief Synopsis:    The patient was admitted due to renal colic from left nephrolithiasis and hydronephrosis.  She has cystoscopy with stent placement.  She was eventually for discharge home on pain medications and prophylactic antibiotics.  She will follow-up with urology as an outpatient.    All diagnosis' and recommendations discussed with patient and/or family in detail.      Lace+ Score: 53  59-90 High Risk  29-58 Medium Risk  0-28   Low Risk       TCM Follow-Up Recommendation:  LACE 29-58: Moderate Risk of readmission after discharge from the hospital.    Procedures during hospitalization:   Cysto and stent      Consultants:      Discharge Medication List:     Discharge Medications        START taking these medications        Instructions Prescription details   cephALEXin 500 MG Caps  Commonly known as: Keflex      Take 1 capsule (500 mg total) by mouth 3 (three) times daily for 3 days.   Stop taking on: May 5, 2025  Quantity: 9 capsule  Refills: 0     oxybutynin 5 MG Tabs  Commonly known as: Ditropan      Take 1 tablet (5 mg total) by mouth 3 (three) times daily as needed (Bladder spasms). Stop 12 hours before [place] catheter removal in clinic (if applicable)   Quantity: 15 tablet  Refills: 0     phenazopyridine 100 MG Tabs  Commonly known as:  Pyridium      Take 1 tablet (100 mg total) by mouth 3 (three) times daily as needed for Pain. This will turn your urine orange.   Quantity: 10 tablet  Refills: 0     Polyethylene Glycol 3350 17 g Pack  Commonly known as: MiraLax      Take 17 g by mouth daily as needed (Take to avoid constipation, especially if taking narcotic pain medications.).   Quantity: 20 packet  Refills: 1            CONTINUE taking these medications        Instructions Prescription details   ALPRAZolam 0.5 MG Tabs  Commonly known as: Xanax      Take 1 tablet (0.5 mg total) by mouth as needed.   Refills: 0     buPROPion  MG Tb24  Commonly known as: Wellbutrin XL      2 tabs po Qam   Quantity: 60 tablet  Refills: 3     HYDROcodone-acetaminophen 5-325 MG Tabs  Commonly known as: Norco      Take 1-2 tablets by mouth every 4 (four) hours as needed for Pain.   Quantity: 20 tablet  Refills: 0     Levothyroxine Sodium 300 MCG Tabs      Take 1 tablet (300 mcg total) by mouth daily. Take levothyroxine 25mcg with this to equal 325mcg daily.   Quantity: 90 tablet  Refills: 0     levothyroxine 25 MCG Tabs  Commonly known as: Synthroid      Take 1 tablet (25 mcg total) by mouth before breakfast. Take levothyroxine 300mcg with this to equal 325mcg daily.   Quantity: 90 tablet  Refills: 0     Naloxone HCl 4 MG/0.1ML Liqd      4 mg by Nasal route as needed. If patient remains unresponsive, repeat dose in other nostril 2-5 minutes after first dose.   Quantity: 1 kit  Refills: 0     Naloxone HCl 4 MG/0.1ML Liqd      4 mg by Intranasal route as needed (May repeat as needed in other nostril if symptoms persist). If patient remains unresponsive, repeat dose in other nostril 2-5 minutes after first dose.   Quantity: 1 kit  Refills: 0     Phentermine HCl 37.5 MG Tabs  Commonly known as: ADIPEX-P      Take 1 tablet (37.5 mg total) by mouth every morning before breakfast.   Quantity: 30 tablet  Refills: 3     rOPINIRole HCl 3 MG Tabs  Commonly known as: REQUIP       Take 2 tablets (6 mg total) by mouth nightly.   Refills: 0     rosuvastatin 5 MG Tabs  Commonly known as: Crestor      Take 1 tablet (5 mg total) by mouth nightly.   Quantity: 90 tablet  Refills: 0     tamsulosin 0.4 MG Caps  Commonly known as: Flomax      Take 1 capsule (0.4 mg total) by mouth daily for 7 days.   Stop taking on: May 8, 2025  Quantity: 7 capsule  Refills: 0     topiramate 25 MG Tabs  Commonly known as: TopaMAX      1 tab po Qam before breakfast x 1 week, then increase to 2 tabs po Qam before breakfast.   Quantity: 60 tablet  Refills: 3               Where to Get Your Medications        These medications were sent to [facility]/pharmacy #[id number] [place], [place] - [address] [phone], [phone] [address] [facility] [address]      Phone: [phone]   cephALEXin 500 MG Caps  oxybutynin 5 MG Tabs  phenazopyridine 100 MG Tabs  Polyethylene Glycol 3350 17 g Pack  tamsulosin 0.4 MG Caps         [place]PMP reviewed[place]    Follow-up appointment:   Walker Andrade MD  [address] [place] [id number] [phone]    Schedule an appointment as soon as possible for a visit  Make an appointment for outpatient ureteroscopy for treatment of left ureteral stone and left renal stone burden.      Vital signs:  Temp:  [98.4 °F (36.9 °C)] 98.4 °F (36.9 °C)  Pulse:  [82] 82  Resp:  [18] 18  BP: (140)/(82) 140/82  SpO2:  [98 %] 98 %    Physical Exam:    General: No acute distress   Lungs: clear to auscultation  Cardiovascular: S1, S2  Abdomen: Soft      -----------------------------------------------------------------------------------------------  PATIENT DISCHARGE INSTRUCTIONS: See electronic chart    Demian Barth MD    Total minutes spent on discharge plannin      The  [employer] Cures Act makes medical notes like these available to patients in the interest of transparency. Please be advised this is a medical document. Medical documents are intended to carry relevant  information, facts as evident, and the clinical opinion of the practitioner. The medical note is intended as peer to peer communication and may appear blunt or direct. It is written in medical language and may contain abbreviations or verbiage that are unfamiliar.

## 2025-05-05 ENCOUNTER — PATIENT OUTREACH (OUTPATIENT)
Dept: CASE MANAGEMENT | Age: 40
End: 2025-05-05

## 2025-05-05 ENCOUNTER — TELEPHONE (OUTPATIENT)
Dept: SURGERY | Facility: CLINIC | Age: 40
End: 2025-05-05

## 2025-05-05 RX ORDER — OXYBUTYNIN CHLORIDE 5 MG/1
5 TABLET ORAL 3 TIMES DAILY PRN
Qty: 15 TABLET | Refills: 0 | Status: SHIPPED | OUTPATIENT
Start: 2025-05-05

## 2025-05-05 RX ORDER — PHENAZOPYRIDINE HYDROCHLORIDE 100 MG/1
100 TABLET, FILM COATED ORAL 3 TIMES DAILY PRN
Qty: 10 TABLET | Refills: 0 | Status: SHIPPED | OUTPATIENT
Start: 2025-05-05

## 2025-05-05 NOTE — TELEPHONE ENCOUNTER
Spoke with pt, scheduled surgery for 5.20.25 with Dr Andrade, approved by OR.  Surgery information sent to pt thru portal.     Pt aware to complete Urine culture 2 wks prior to surgery.     2wk follow up for stent removal scheduled for 5/28/25 for 2pm, arrival at 1:30p.

## 2025-05-05 NOTE — TELEPHONE ENCOUNTER
Rn phone Patient to discuss below.  5/1/25 Cysto, Left Ureteroscopy/laser lithotripsy with stent  5/20/25 next Surgery    Patient requesting refills of Oyxbutynin & Phenazopyridine for bladder spasms and increase back pain. 7/10 pain scale.  Admin. Tylenol 1000 mg for pain.  Patient states she unable to take Ibuprofen for inflammation.  Hydrating 40-60 oz of water daily and voiding well.  Denies fever/chills, and constipation, but some mild nausea.  ER/ICC precautions given if pain worsen.  Patient agreeable with plan.    Dr. Andrade for your review and recommendation.

## 2025-05-05 NOTE — TELEPHONE ENCOUNTER
RN phone Patient to notify Dr. Andrade has refilled her request for Oxybutynin & Pyridium for bladder spasms.  Patient instructed that pain should decrease and to keep Urology updated. Patient given ER precautions for moderate/severe pain.  Patient agreeable with plan.  This Encounter is now closed.

## 2025-05-05 NOTE — PROGRESS NOTES
Transitional Care Management   Discharge Date: 25  Contact Date: 2025    Assessment:  TCM Initial Assessment    General:  Assessment completed with: Patient  Patient Subjective: Pt doing better.  Chief Complaint: Intractable abdominal pain  Verify patient name and  with patient/ caregiver: Yes    Hospital Stay/Discharge:  Prior to leaving the hospital were your Discharge Instructions reviewed with you?: Yes  Did you receive a copy of your written Discharge Instructions?: Yes  Do you feel better or worse since you left the hospital or emergency department?: Better    Follow - Up Appointment:  Do you have a follow-up appointment?: No    Home Health/DME:  Prior to leaving the hospital was Home Health (HH) arranged for you?: No     Prior to leaving the hospital or emergency department was Durable Medical Equipment (DME), medical supplies, or infusions arranged for you?: No  Are DME/medical supply/infusions needs identified by staff during this assessment?: No     Medications/Diet:       Were you given a different diet per your Discharge Instructions?: No     Questions/Concerns:  Do you have any questions or concerns that have not been discussed?: No         Follow-up Appointments:      Transitional Care Clinic  Was TCC Ordered: No  Was TCC Scheduled: No, Explain deferred following Urology.      Primary Care Provider (If no TCC appointment)  Does patient already have a PCP appointment scheduled? No  Care Manager Attempted to schedule PCP office TCM appointment with patient   -If no appointment scheduled: Explain see above.     Specialist  Does the patient have any other follow-up appointment(s) that need to be scheduled? Yes   -If yes: Care Manager reviewed upcoming specialist appointments with patient: Yes   -Does the patient need assistance scheduling appointment(s): No      Book By Date: 25

## 2025-05-05 NOTE — TELEPHONE ENCOUNTER
Per patient requesting a refill of oxybutinin and phenazopyridine. Per patient also asking Dr. Andrade if there was anything that she could to do aid her constant back pain. Please call back.

## 2025-05-06 ENCOUNTER — LAB ENCOUNTER (OUTPATIENT)
Dept: LAB | Age: 40
End: 2025-05-06
Attending: UROLOGY
Payer: COMMERCIAL

## 2025-05-06 DIAGNOSIS — N20.0 KIDNEY STONE: ICD-10-CM

## 2025-05-06 PROCEDURE — 87086 URINE CULTURE/COLONY COUNT: CPT

## 2025-05-07 ENCOUNTER — APPOINTMENT (OUTPATIENT)
Dept: ADMINISTRATIVE | Facility: HOSPITAL | Age: 40
End: 2025-05-07
Payer: COMMERCIAL

## 2025-05-07 RX ORDER — SODIUM CHLORIDE, SODIUM LACTATE, POTASSIUM CHLORIDE, CALCIUM CHLORIDE 600; 310; 30; 20 MG/100ML; MG/100ML; MG/100ML; MG/100ML
INJECTION, SOLUTION INTRAVENOUS CONTINUOUS
Status: CANCELLED | OUTPATIENT
Start: 2025-05-07

## 2025-05-07 RX ORDER — ACETAMINOPHEN 500 MG
1000 TABLET ORAL ONCE
Status: CANCELLED | OUTPATIENT
Start: 2025-05-07 | End: 2025-05-07

## 2025-05-08 ENCOUNTER — PATIENT MESSAGE (OUTPATIENT)
Dept: SURGERY | Facility: CLINIC | Age: 40
End: 2025-05-08

## 2025-05-08 ENCOUNTER — TELEPHONE (OUTPATIENT)
Dept: SURGERY | Facility: CLINIC | Age: 40
End: 2025-05-08

## 2025-05-08 NOTE — TELEPHONE ENCOUNTER
Rn called Patient to discuss below.  Patient reports having increased left Side back pain despite taking OTC Tylenol 1000 mg. 6/7-10 Pain scale.  Denies fever/chills, but has some nausea.   Voiding freely, no blood in urine.  Denies frequency/urgency.     Patient had urine culture on 5/6 with some contamination. Surgery scheduled for 5/20.    Dr. Andrade: for your review and recommendation

## 2025-05-08 NOTE — TELEPHONE ENCOUNTER
Rec;d Patient call from the Call Center.  Patient advised of  Dr. Andrade recommendation of Pain is expected, continue all medications as given (likely having stent reflux- urine going from bladder into the kidney).Patient  can try warm bath as well. ER precautions if pain doesn't improve or symptoms of infection(fever/chills, nausea/vomiting)  Patient agreeable with plan.  This Encounter is now closed.

## 2025-05-08 NOTE — TELEPHONE ENCOUNTER
Phoned Patient to discuss below.  No answer, left voice message to call [phone] ext. [phone].

## 2025-05-08 NOTE — TELEPHONE ENCOUNTER
Patient calling stating she has a stent inserted and is causing her back pain  on left side by her kidney , wants to know if that is normal? Please advise

## 2025-05-08 NOTE — TELEPHONE ENCOUNTER
My chart message  Hi Dr. Andrade,     I did call the office but I am having pretty bad back pain on my left side where my kidney is. The pain is not getting better but seems to be getting worse. I am not having any other problems from the stent besides the pain. I have tried heating pads, Tylenol, and the bladder meds and nothing helps it. I am also experiencing nausea. Is this a normal side effect? I was hoping after a week things would be getting better as I still have another 12 days before procedure.      Thanks,  Iliana

## 2025-05-09 ENCOUNTER — APPOINTMENT (OUTPATIENT)
Dept: GENERAL RADIOLOGY | Facility: HOSPITAL | Age: 40
End: 2025-05-09
Attending: EMERGENCY MEDICINE
Payer: COMMERCIAL

## 2025-05-09 ENCOUNTER — HOSPITAL ENCOUNTER (EMERGENCY)
Facility: HOSPITAL | Age: 40
Discharge: HOME | End: 2025-05-09
Attending: EMERGENCY MEDICINE
Payer: COMMERCIAL

## 2025-05-09 VITALS
DIASTOLIC BLOOD PRESSURE: 88 MMHG | SYSTOLIC BLOOD PRESSURE: 126 MMHG | RESPIRATION RATE: 18 BRPM | WEIGHT: 230 LBS | HEIGHT: 68 IN | OXYGEN SATURATION: 99 % | TEMPERATURE: 98 F | BODY MASS INDEX: 34.86 KG/M2 | HEART RATE: 88 BPM

## 2025-05-09 DIAGNOSIS — Z96.0 URETERAL STENT PRESENT: Primary | ICD-10-CM

## 2025-05-09 DIAGNOSIS — R10.9 LEFT FLANK PAIN: ICD-10-CM

## 2025-05-09 LAB
ALBUMIN SERPL-MCNC: 5.1 G/DL (ref 3.2–4.8)
ALBUMIN/GLOB SERPL: 1.8 {RATIO} (ref 1–2)
ALP LIVER SERPL-CCNC: 44 U/L (ref 37–98)
ALT SERPL-CCNC: 13 U/L (ref 10–49)
ANION GAP SERPL CALC-SCNC: 7 MMOL/L (ref 0–18)
AST SERPL-CCNC: 15 U/L (ref ?–34)
BASOPHILS # BLD AUTO: 0.04 X10(3) UL (ref 0–0.2)
BASOPHILS NFR BLD AUTO: 0.5 %
BILIRUB SERPL-MCNC: 0.3 MG/DL (ref 0.3–1.2)
BILIRUB UR QL STRIP.AUTO: NEGATIVE
BUN BLD-MCNC: 11 MG/DL (ref 9–23)
CALCIUM BLD-MCNC: 9.4 MG/DL (ref 8.7–10.6)
CHLORIDE SERPL-SCNC: 105 MMOL/L (ref 98–112)
CLARITY UR REFRACT.AUTO: CLEAR
CO2 SERPL-SCNC: 24 MMOL/L (ref 21–32)
CREAT BLD-MCNC: 0.79 MG/DL (ref 0.55–1.02)
EGFRCR SERPLBLD CKD-EPI 2021: 97 ML/MIN/1.73M2 (ref 60–?)
EOSINOPHIL # BLD AUTO: 0.15 X10(3) UL (ref 0–0.7)
EOSINOPHIL NFR BLD AUTO: 1.8 %
ERYTHROCYTE [DISTWIDTH] IN BLOOD BY AUTOMATED COUNT: 15 %
GLOBULIN PLAS-MCNC: 2.9 G/DL (ref 2–3.5)
GLUCOSE BLD-MCNC: 112 MG/DL (ref 70–99)
GLUCOSE UR STRIP.AUTO-MCNC: NORMAL MG/DL
HCT VFR BLD AUTO: 33.7 % (ref 35–48)
HGB BLD-MCNC: 10.4 G/DL (ref 12–16)
IMM GRANULOCYTES # BLD AUTO: 0.05 X10(3) UL (ref 0–1)
IMM GRANULOCYTES NFR BLD: 0.6 %
KETONES UR STRIP.AUTO-MCNC: NEGATIVE MG/DL
LEUKOCYTE ESTERASE UR QL STRIP.AUTO: NEGATIVE
LYMPHOCYTES # BLD AUTO: 1.67 X10(3) UL (ref 1–4)
LYMPHOCYTES NFR BLD AUTO: 20.3 %
MCH RBC QN AUTO: 22.6 PG (ref 26–34)
MCHC RBC AUTO-ENTMCNC: 30.9 G/DL (ref 31–37)
MCV RBC AUTO: 73.3 FL (ref 80–100)
MONOCYTES # BLD AUTO: 0.53 X10(3) UL (ref 0.1–1)
MONOCYTES NFR BLD AUTO: 6.5 %
NEUTROPHILS # BLD AUTO: 5.77 X10 (3) UL (ref 1.5–7.7)
NEUTROPHILS # BLD AUTO: 5.77 X10(3) UL (ref 1.5–7.7)
NEUTROPHILS NFR BLD AUTO: 70.3 %
OSMOLALITY SERPL CALC.SUM OF ELEC: 282 MOSM/KG (ref 275–295)
PH UR STRIP.AUTO: 6 [PH] (ref 5–8)
PLATELET # BLD AUTO: 423 10(3)UL (ref 150–450)
POTASSIUM SERPL-SCNC: 4.1 MMOL/L (ref 3.5–5.1)
PROT SERPL-MCNC: 8 G/DL (ref 5.7–8.2)
PROT UR STRIP.AUTO-MCNC: 50 MG/DL
RBC # BLD AUTO: 4.6 X10(6)UL (ref 3.8–5.3)
RBC #/AREA URNS AUTO: >10 /HPF
SODIUM SERPL-SCNC: 136 MMOL/L (ref 136–145)
SP GR UR STRIP.AUTO: 1.01 (ref 1–1.03)
UROBILINOGEN UR STRIP.AUTO-MCNC: NORMAL MG/DL
WBC # BLD AUTO: 8.2 X10(3) UL (ref 4–11)

## 2025-05-09 PROCEDURE — 85025 COMPLETE CBC W/AUTO DIFF WBC: CPT

## 2025-05-09 PROCEDURE — 87086 URINE CULTURE/COLONY COUNT: CPT | Performed by: EMERGENCY MEDICINE

## 2025-05-09 PROCEDURE — 96376 TX/PRO/DX INJ SAME DRUG ADON: CPT

## 2025-05-09 PROCEDURE — 85025 COMPLETE CBC W/AUTO DIFF WBC: CPT | Performed by: EMERGENCY MEDICINE

## 2025-05-09 PROCEDURE — 81001 URINALYSIS AUTO W/SCOPE: CPT

## 2025-05-09 PROCEDURE — 80053 COMPREHEN METABOLIC PANEL: CPT

## 2025-05-09 PROCEDURE — 81001 URINALYSIS AUTO W/SCOPE: CPT | Performed by: EMERGENCY MEDICINE

## 2025-05-09 PROCEDURE — 80053 COMPREHEN METABOLIC PANEL: CPT | Performed by: EMERGENCY MEDICINE

## 2025-05-09 PROCEDURE — 96361 HYDRATE IV INFUSION ADD-ON: CPT

## 2025-05-09 PROCEDURE — 96375 TX/PRO/DX INJ NEW DRUG ADDON: CPT

## 2025-05-09 PROCEDURE — 74018 RADEX ABDOMEN 1 VIEW: CPT | Performed by: EMERGENCY MEDICINE

## 2025-05-09 PROCEDURE — 96374 THER/PROPH/DIAG INJ IV PUSH: CPT

## 2025-05-09 PROCEDURE — 99285 EMERGENCY DEPT VISIT HI MDM: CPT

## 2025-05-09 PROCEDURE — 99284 EMERGENCY DEPT VISIT MOD MDM: CPT

## 2025-05-09 RX ORDER — ONDANSETRON 4 MG/1
4 TABLET, FILM COATED ORAL EVERY 8 HOURS PRN
Qty: 10 TABLET | Refills: 0 | Status: SHIPPED | OUTPATIENT
Start: 2025-05-09 | End: 2025-05-16

## 2025-05-09 RX ORDER — HYDROCODONE BITARTRATE AND ACETAMINOPHEN 5; 325 MG/1; MG/1
1-2 TABLET ORAL EVERY 6 HOURS PRN
Qty: 20 TABLET | Refills: 0 | Status: SHIPPED | OUTPATIENT
Start: 2025-05-09 | End: 2025-05-13 | Stop reason: CLARIF

## 2025-05-09 RX ORDER — ONDANSETRON 2 MG/ML
4 INJECTION INTRAMUSCULAR; INTRAVENOUS ONCE
Status: COMPLETED | OUTPATIENT
Start: 2025-05-09 | End: 2025-05-09

## 2025-05-09 RX ORDER — HYDROMORPHONE HYDROCHLORIDE 1 MG/ML
0.5 INJECTION, SOLUTION INTRAMUSCULAR; INTRAVENOUS; SUBCUTANEOUS EVERY 30 MIN PRN
Refills: 0 | Status: DISCONTINUED | OUTPATIENT
Start: 2025-05-09 | End: 2025-05-09

## 2025-05-09 NOTE — DISCHARGE INSTRUCTIONS
Do not exceed a total of 8 acetaminophen tablets between plain acetaminophen and hydrocodone in a 24-hour timeframe.    Return to the emergency department for increased pain or fever 100 degrees or greater.

## 2025-05-09 NOTE — ED PROVIDER NOTES
Patient Seen in: [facility] Emergency Department      History     Chief Complaint   Patient presents with    Postop/Procedure Problem     Stated Complaint: kidney stent placed last thursday, back pain and vomiting    Subjective:   HPI    39-year-old female comes to the emergency department complaining of increased left flank and left pelvic pain since midmorning today associated with vomiting.  No fever.  Patient has a left ureteral stent due to a 5 mm stone noted in late April in the proximal left ureter.  She is scheduled for laser lithotripsy and stent exchange on May 20.  She has been taking Tylenol at home.  She has Zofran but did not have it with her at work where her symptoms began this morning.  History of Present Illness               Objective:     Past Medical History:    Anxiety state    Bigeminy    Calculus of kidney    Depressive disorder    Difficult intubation    documented 2020 by Dr Villegas and 25 by Dr Ch    Disorder of thyroid    Dysmenorrhea    Elevated LFTs    Essential hypertension    GERD (gastroesophageal reflux disease)    HEADACHES    History of miscarriage, currently pregnant ([facility])    Hx of motion sickness    HYPOTHYROIDISM    Irregular menses    Low-lying placenta ([facility])    Malpositioned IUD    Mental disorder    hx of depression after ectopic pregnancy    Migraines    HANNA (nonalcoholic steatohepatitis)    before , no current problems    Painful lumpy left breast    Palpitations    PONV (postoperative nausea and vomiting)    Post partum depression    hx of after ectopic pregnancy, took meds for about 6 weeks    Pregnancy ([facility])    Pregnancy-induced hypertension ([facility])    Shortness of breath    Sleep apnea    no current treatment    Syncope, near    Unspecified hypothyroidism    Visual impairment    glasses              Past Surgical History:   Procedure Laterality Date          x2    Cholecystectomy  2020    Gastric bypass,obese<100cm shana-en-y       Laparoscopic salpingostomy Right 2013    right-ectopic    Other surgical history  2005    thyroid removed    Removal gallbladder      Thyroidectomy  2005    nodular goiter    Total abdominal hysterectomy  12/2023    + ovaries, cervix removed                Social History     Socioeconomic History    Marital status:    Tobacco Use    Smoking status: Never    Smokeless tobacco: Never   Vaping Use    Vaping status: Never Used   Substance and Sexual Activity    Alcohol use: Yes     Comment: once a month    Drug use: No    Sexual activity: Yes     Partners: Male     Social Drivers of Health     Food Insecurity: No Food Insecurity (5/1/2025)    NCSS - Food Insecurity     Worried About Running Out of Food in the Last Year: No     Ran Out of Food in the Last Year: No   Transportation Needs: No Transportation Needs (5/1/2025)    NCSS - Transportation     Lack of Transportation: No   Housing Stability: Not At Risk (5/1/2025)    NCSS - Housing/Utilities     Has Housing: Yes     Worried About Losing Housing: No     Unable to Get Utilities: No                                Physical Exam     ED Triage Vitals [05/09/25 1146]   BP (!) 134/94   Pulse 97   Resp 18   Temp 97.3 °F (36.3 °C)   Temp src Temporal   SpO2 98 %   O2 Device None (Room air)       Current Vitals:   Vital Signs  BP: (!) 134/94  Pulse: 97  Resp: 18  Temp: 97.3 °F (36.3 °C)  Temp src: Temporal    Oxygen Therapy  SpO2: 98 %  O2 Device: None (Room air)        Physical Exam     Physical Exam            General appearance: This is a young adult female sitting on a gurney.  Vital signs were reviewed per nurses notes.  Patient is afebrile.  HEENT: Normocephalic/atraumatic.  Anicteric sclera.  Oral mucosa is moist.  Oropharynx is normal.  Neck: No adenopathy or thyromegaly.  Lungs are clear to auscultation.  Heart exam: Normal S1-S2 without extra sounds or murmurs.  Regular rate and rhythm.  Abdomen is nontender.  Back: No CVA tenderness.  Skin is dry without  rashes or lesions.  Neuroexam: Awake, conversive and moving all 4 extremities well.    ED Course     Labs Reviewed   URINALYSIS WITH CULTURE REFLEX - Abnormal; Notable for the following components:       Result Value    Blood Urine 3+ (*)     Protein Urine 50 (*)     Nitrite Urine 1+ (*)     WBC Urine 6-10 (*)     RBC Urine >10 (*)     Squamous Epi. Cells Few (*)     All other components within normal limits   CBC WITH DIFFERENTIAL WITH PLATELET - Abnormal; Notable for the following components:    HGB 10.4 (*)     HCT 33.7 (*)     MCV 73.3 (*)     MCH 22.6 (*)     MCHC 30.9 (*)     All other components within normal limits   COMP METABOLIC PANEL (14) - Abnormal; Notable for the following components:    Glucose 112 (*)     Albumin 5.1 (*)     All other components within normal limits   RAINBOW DRAW LAVENDER   RAINBOW DRAW LIGHT GREEN   URINE CULTURE, ROUTINE          Results            Intravenous access was obtained.  Laboratory studies were drawn.  IV fluids, analgesics and antiemetics were administered.    Urinalysis shows some white blood cells and red blood cells consistent with presence of stent but no obvious urinary tract infection.  Urine culture was sent.    XR ABDOMEN (1 VIEW) (CPT=74018)  Result Date: 5/9/2025  PROCEDURE:  XR ABDOMEN (1 VIEW) (CPT=74018)  INDICATIONS:  Left ureteral stent.  COMPARISON:  [facility], CT, CT ABDOMEN+PELVIS KIDNEYSTONE 2D RNDR(NO IV,NO ORAL)(CPT=74176), 4/30/2025, 7:40 AM.  [facility], XR, XR ABDOMEN, OBSTRUCTIVE SERIES (CPT=74020), 3/11/2017, 1:36 PM.  TECHNIQUE:  Supine AP view was obtained.  PATIENT STATED HISTORY: (As transcribed by Technologist)  Left ureteral stent. Increased pain and vomiting.  Evaluate for position.    FINDINGS:   A left ureteral stent is seen.  There is a possible 4 mm stone projecting over the distal left aspect of the left ureteral stent.  Alternatively, this could represent a phlebolith.  Clinical correlation with the patient's symptoms is  recommended.  There are small stones projecting over the kidneys. If the patient's symptoms persist or worsen, consider further assessment with ultrasound or CT.            CONCLUSION:  See above.   LOCATION:  [id number]   Dictated by (CST): Stromberg, LeRoy, MD on 5/09/2025 at 1:57 PM     Finalized by (CST): Stromberg, LeRoy, MD on 5/09/2025 at 2:01 PM       XR OR - N/C  Result Date: 5/1/2025  PROCEDURE:  XR OR - N/C  COMPARISON:  None.  INDICATIONS:  Cystogram  TECHNIQUE:   FLUOROSCOPY IMAGES OBTAINED:  7 FLUOROSCOPY TIME:  38 seconds TECHNOLOGIST TIME:  1 hour RADIATION DOSE (AIR KERMA PRODUCT):  454.3uGy/m2   FINDINGS:   7 intraoperative fluoroscopic image(s) submitted for interpretation postoperatively.  Image(s) demonstrate cannulation of the right ureter.  Placement of right-sided nephroureteral stent.. Correlation with real time fluoroscopy examination is recommended. See operative report for further details.            CONCLUSION: Intraoperative images for operative control.  LOCATION:  [facility]    Dictated by ([facility]): Richie Almonte MD on 5/01/2025 at 10:20 PM     Finalized by (CST): Richie Almonte MD on 5/01/2025 at 10:21 PM       CT ABDOMEN+PELVIS KIDNEYSTONE 2D RNDR(NO IV,NO ORAL)(CPT=74176)  Result Date: 4/30/2025  PROCEDURE:  CT ABDOMEN+PELVIS KIDNEYSTONE 2D RNDR(NO IV,NO ORAL)(CPT=74176)  COMPARISON:  [facility], CT, CT ABDOMEN+PELVIS(CONTRAST ONLY)(CPT=74177), 1/13/2025, 3:56 PM.  [facility], CT, CT ABDOMEN+PELVIS KIDNEYSTONE 2D RNDR(NO IV,NO ORAL)(CPT=74176), 2/26/2023, 9:16 AM.  INDICATIONS:  Left low back pain - denies taking medication pta - hx kidney stones  TECHNIQUE:  Unenhanced multislice CT scanning from above the kidneys to below the urinary bladder.  2D rendering are generated on the CT scanner workstation to localize potential stones in the cranio-caudal plane.  Dose reduction techniques were used. Dose information is transmitted to the ACR (American College of Radiology) NRDR (National  Radiology Data Registry) which includes the Dose Index Registry.  PATIENT STATED HISTORY: (As transcribed by Technologist)  Acute right flank pain, Hx of kidney stones.    FINDINGS:  LUNG BASES:  Lung bases are clear. LIVER:  Normal in shape and contour but limited evaluation without contrast. BILIARY:   Cholecystectomy clips.  No intrahepatic biliary dilatation.. SPLEEN:  Normal.  No enlargement or focal lesion. PANCREAS:  No mino-pancreatic inflammatory stranding ADRENALS:  Normal.  No mass or enlargement.  KIDNEYS:  Mild left-sided hydronephrosis.  There is a 5 mm proximal left ureteral calculus.  Nonobstructing bilateral renal calculi. BOWEL/MESENTERY:  Bowel is normal in caliber. No evidence of obstruction.  Postoperative changes from gastric bypass PELVIS:  Bladder is unremarkable in appearance.  Calcified phleboliths in the pelvis.   AORTA/VASCULAR:  Aorta is normal in caliber. BONES:  Degenerative changes greatest at L5-S1 level.            CONCLUSION:  1. Mild left-sided hydronephrosis with a 5 mm proximal left ureteral calculus. 2. Nonobstructing bilateral renal calculi.    LOCATION:  [id number]   Dictated by (CST): Richie Almonte MD on 4/30/2025 at 7:54 AM     Finalized by (CST): Richie Almonte MD on 4/30/2025 at 7:57 AM       I personally reviewed the images myself and went over results with patient.    I viewed the KUB film from today myself and the left ureteral stent is in place.  Questionable stone in the distal left ureter versus phlebolith.    Upon reevaluation the patient was symptomatically improved after opioid analgesics although she continued to have moderate pain.    Second dose of medication was administered prior to discharge.  Exam findings, test results and treatment plan were discussed.  Prescriptions for Norco and Zofran were sent to the patient's pharmacy.  Criteria for ED visit return and possible hospitalization was discussed.               MDM      #1.  Left ureteral stent.  This  was placed due to a 5 mm obstructing stone in the proximal ureter.  Patient is scheduled to have cystoscopy, laser lithotripsy and stent exchange in 11 days.  No evidence of urinary tract infection.  Medications optimized.  Unable to take NSAIDs due to gastric bypass surgery.  2.  Left flank pain secondary to #1.        Medical Decision Making      Disposition and Plan     Clinical Impression:  1. Ureteral stent present    2. Left flank pain         Disposition:  Discharge  5/9/2025  2:11 pm    Follow-up:  Walker Andrade MD  [address] [place] [address]  [phone]    Call in 3 day(s)            Medications Prescribed:  Current Discharge Medication List        START taking these medications    Details   !! HYDROcodone-acetaminophen 5-325 MG Oral Tab Take 1-2 tablets by mouth every 6 (six) hours as needed for Pain.  Qty: 20 tablet, Refills: 0    Associated Diagnoses: Left flank pain      ondansetron (ZOFRAN) 4 mg tablet Take 1 tablet (4 mg total) by mouth every 8 (eight) hours as needed for Nausea.  Qty: 10 tablet, Refills: 0       !! - Potential duplicate medications found. Please discuss with provider.          Supplementary Documentation:

## 2025-05-13 ENCOUNTER — TELEPHONE (OUTPATIENT)
Dept: SURGERY | Facility: CLINIC | Age: 40
End: 2025-05-13

## 2025-05-13 ENCOUNTER — APPOINTMENT (OUTPATIENT)
Dept: CT IMAGING | Facility: HOSPITAL | Age: 40
End: 2025-05-13
Attending: HOSPITALIST
Payer: COMMERCIAL

## 2025-05-13 ENCOUNTER — HOSPITAL ENCOUNTER (OUTPATIENT)
Facility: HOSPITAL | Age: 40
Setting detail: OBSERVATION
Discharge: HOME | End: 2025-05-14
Attending: EMERGENCY MEDICINE | Admitting: HOSPITALIST
Payer: COMMERCIAL

## 2025-05-13 DIAGNOSIS — R31.0 GROSS HEMATURIA: ICD-10-CM

## 2025-05-13 DIAGNOSIS — R10.9 INTRACTABLE ABDOMINAL PAIN: Primary | ICD-10-CM

## 2025-05-13 DIAGNOSIS — R10.9 FLANK PAIN: ICD-10-CM

## 2025-05-13 PROBLEM — D64.9 ANEMIA: Status: ACTIVE | Noted: 2025-05-13

## 2025-05-13 PROBLEM — R73.9 HYPERGLYCEMIA: Status: ACTIVE | Noted: 2025-05-13

## 2025-05-13 LAB
ALBUMIN SERPL-MCNC: 5 G/DL (ref 3.2–4.8)
ALBUMIN/GLOB SERPL: 1.7 {RATIO} (ref 1–2)
ALP LIVER SERPL-CCNC: 44 U/L (ref 37–98)
ALT SERPL-CCNC: 12 U/L (ref 10–49)
ANION GAP SERPL CALC-SCNC: 9 MMOL/L (ref 0–18)
AST SERPL-CCNC: 14 U/L (ref ?–34)
BASOPHILS # BLD AUTO: 0.04 X10(3) UL (ref 0–0.2)
BASOPHILS NFR BLD AUTO: 0.3 %
BILIRUB SERPL-MCNC: 0.5 MG/DL (ref 0.3–1.2)
BILIRUB UR QL STRIP.AUTO: NEGATIVE
BUN BLD-MCNC: 7 MG/DL (ref 9–23)
CALCIUM BLD-MCNC: 9.5 MG/DL (ref 8.7–10.6)
CHLORIDE SERPL-SCNC: 105 MMOL/L (ref 98–112)
CO2 SERPL-SCNC: 22 MMOL/L (ref 21–32)
CREAT BLD-MCNC: 0.8 MG/DL (ref 0.55–1.02)
EGFRCR SERPLBLD CKD-EPI 2021: 95 ML/MIN/1.73M2 (ref 60–?)
EOSINOPHIL # BLD AUTO: 0.06 X10(3) UL (ref 0–0.7)
EOSINOPHIL NFR BLD AUTO: 0.5 %
ERYTHROCYTE [DISTWIDTH] IN BLOOD BY AUTOMATED COUNT: 14.8 %
GLOBULIN PLAS-MCNC: 2.9 G/DL (ref 2–3.5)
GLUCOSE BLD-MCNC: 137 MG/DL (ref 70–99)
GLUCOSE UR STRIP.AUTO-MCNC: NORMAL MG/DL
HCT VFR BLD AUTO: 32.5 % (ref 35–48)
HGB BLD-MCNC: 10.4 G/DL (ref 12–16)
IMM GRANULOCYTES # BLD AUTO: 0.07 X10(3) UL (ref 0–1)
IMM GRANULOCYTES NFR BLD: 0.6 %
KETONES UR STRIP.AUTO-MCNC: NEGATIVE MG/DL
LEUKOCYTE ESTERASE UR QL STRIP.AUTO: 75
LYMPHOCYTES # BLD AUTO: 0.99 X10(3) UL (ref 1–4)
LYMPHOCYTES NFR BLD AUTO: 8.6 %
MCH RBC QN AUTO: 23.2 PG (ref 26–34)
MCHC RBC AUTO-ENTMCNC: 32 G/DL (ref 31–37)
MCV RBC AUTO: 72.5 FL (ref 80–100)
MONOCYTES # BLD AUTO: 0.49 X10(3) UL (ref 0.1–1)
MONOCYTES NFR BLD AUTO: 4.3 %
NEUTROPHILS # BLD AUTO: 9.83 X10 (3) UL (ref 1.5–7.7)
NEUTROPHILS # BLD AUTO: 9.83 X10(3) UL (ref 1.5–7.7)
NEUTROPHILS NFR BLD AUTO: 85.7 %
NITRITE UR QL STRIP.AUTO: NEGATIVE
OSMOLALITY SERPL CALC.SUM OF ELEC: 282 MOSM/KG (ref 275–295)
PH UR STRIP.AUTO: 7.5 [PH] (ref 5–8)
PLATELET # BLD AUTO: 411 10(3)UL (ref 150–450)
POTASSIUM SERPL-SCNC: 4.2 MMOL/L (ref 3.5–5.1)
PROT SERPL-MCNC: 7.9 G/DL (ref 5.7–8.2)
PROT UR STRIP.AUTO-MCNC: 100 MG/DL
RBC # BLD AUTO: 4.48 X10(6)UL (ref 3.8–5.3)
RBC #/AREA URNS AUTO: >10 /HPF
SODIUM SERPL-SCNC: 136 MMOL/L (ref 136–145)
SP GR UR STRIP.AUTO: 1.02 (ref 1–1.03)
UROBILINOGEN UR STRIP.AUTO-MCNC: 2 MG/DL
WBC # BLD AUTO: 11.5 X10(3) UL (ref 4–11)

## 2025-05-13 PROCEDURE — 99223 1ST HOSP IP/OBS HIGH 75: CPT | Performed by: HOSPITALIST

## 2025-05-13 PROCEDURE — 74176 CT ABD & PELVIS W/O CONTRAST: CPT | Performed by: HOSPITALIST

## 2025-05-13 RX ORDER — ROPINIROLE 2 MG/1
6 TABLET, FILM COATED ORAL NIGHTLY
Status: DISCONTINUED | OUTPATIENT
Start: 2025-05-13 | End: 2025-05-14

## 2025-05-13 RX ORDER — HYDROCODONE BITARTRATE AND ACETAMINOPHEN 5; 325 MG/1; MG/1
1 TABLET ORAL EVERY 4 HOURS PRN
Status: DISCONTINUED | OUTPATIENT
Start: 2025-05-13 | End: 2025-05-14

## 2025-05-13 RX ORDER — BISACODYL 10 MG
10 SUPPOSITORY, RECTAL RECTAL
Status: DISCONTINUED | OUTPATIENT
Start: 2025-05-13 | End: 2025-05-14

## 2025-05-13 RX ORDER — MORPHINE SULFATE 4 MG/ML
4 INJECTION, SOLUTION INTRAMUSCULAR; INTRAVENOUS EVERY 2 HOUR PRN
Status: DISCONTINUED | OUTPATIENT
Start: 2025-05-13 | End: 2025-05-14

## 2025-05-13 RX ORDER — ONDANSETRON 2 MG/ML
4 INJECTION INTRAMUSCULAR; INTRAVENOUS EVERY 6 HOURS PRN
Status: DISCONTINUED | OUTPATIENT
Start: 2025-05-13 | End: 2025-05-14

## 2025-05-13 RX ORDER — ACETAMINOPHEN 500 MG
1000 TABLET ORAL ONCE
Status: COMPLETED | OUTPATIENT
Start: 2025-05-13 | End: 2025-05-14

## 2025-05-13 RX ORDER — HYDROMORPHONE HYDROCHLORIDE 1 MG/ML
1 INJECTION, SOLUTION INTRAMUSCULAR; INTRAVENOUS; SUBCUTANEOUS EVERY 30 MIN PRN
Refills: 0 | Status: COMPLETED | OUTPATIENT
Start: 2025-05-13 | End: 2025-05-13

## 2025-05-13 RX ORDER — METOCLOPRAMIDE HYDROCHLORIDE 5 MG/ML
10 INJECTION INTRAMUSCULAR; INTRAVENOUS ONCE
Status: COMPLETED | OUTPATIENT
Start: 2025-05-13 | End: 2025-05-13

## 2025-05-13 RX ORDER — BUPROPION HYDROCHLORIDE 300 MG/1
300 TABLET ORAL DAILY
Status: DISCONTINUED | OUTPATIENT
Start: 2025-05-14 | End: 2025-05-14

## 2025-05-13 RX ORDER — LEVOTHYROXINE SODIUM 150 UG/1
300 TABLET ORAL
Status: DISCONTINUED | OUTPATIENT
Start: 2025-05-14 | End: 2025-05-14

## 2025-05-13 RX ORDER — HYDROCODONE BITARTRATE AND ACETAMINOPHEN 5; 325 MG/1; MG/1
2 TABLET ORAL EVERY 4 HOURS PRN
Status: DISCONTINUED | OUTPATIENT
Start: 2025-05-13 | End: 2025-05-14

## 2025-05-13 RX ORDER — LEVOTHYROXINE SODIUM 25 UG/1
25 TABLET ORAL
Status: DISCONTINUED | OUTPATIENT
Start: 2025-05-14 | End: 2025-05-14

## 2025-05-13 RX ORDER — TOPIRAMATE 25 MG/1
50 TABLET, FILM COATED ORAL DAILY
Status: DISCONTINUED | OUTPATIENT
Start: 2025-05-14 | End: 2025-05-14

## 2025-05-13 RX ORDER — DIPHENHYDRAMINE HYDROCHLORIDE 50 MG/ML
25 INJECTION, SOLUTION INTRAMUSCULAR; INTRAVENOUS ONCE
Status: COMPLETED | OUTPATIENT
Start: 2025-05-13 | End: 2025-05-13

## 2025-05-13 RX ORDER — SENNOSIDES 8.6 MG
17.2 TABLET ORAL NIGHTLY PRN
Status: DISCONTINUED | OUTPATIENT
Start: 2025-05-13 | End: 2025-05-14

## 2025-05-13 RX ORDER — OXYBUTYNIN CHLORIDE 5 MG/1
5 TABLET ORAL 3 TIMES DAILY PRN
Status: DISCONTINUED | OUTPATIENT
Start: 2025-05-13 | End: 2025-05-14

## 2025-05-13 RX ORDER — TAMSULOSIN HYDROCHLORIDE 0.4 MG/1
0.4 CAPSULE ORAL NIGHTLY
Status: DISCONTINUED | OUTPATIENT
Start: 2025-05-13 | End: 2025-05-14

## 2025-05-13 RX ORDER — PHENAZOPYRIDINE HYDROCHLORIDE 100 MG/1
100 TABLET, FILM COATED ORAL 3 TIMES DAILY PRN
Status: DISCONTINUED | OUTPATIENT
Start: 2025-05-13 | End: 2025-05-14

## 2025-05-13 RX ORDER — SODIUM CHLORIDE, SODIUM LACTATE, POTASSIUM CHLORIDE, CALCIUM CHLORIDE 600; 310; 30; 20 MG/100ML; MG/100ML; MG/100ML; MG/100ML
INJECTION, SOLUTION INTRAVENOUS CONTINUOUS
Status: DISCONTINUED | OUTPATIENT
Start: 2025-05-13 | End: 2025-05-14

## 2025-05-13 RX ORDER — SODIUM PHOSPHATE, DIBASIC AND SODIUM PHOSPHATE, MONOBASIC 7; 19 G/230ML; G/230ML
1 ENEMA RECTAL ONCE AS NEEDED
Status: DISCONTINUED | OUTPATIENT
Start: 2025-05-13 | End: 2025-05-14

## 2025-05-13 RX ORDER — PROCHLORPERAZINE EDISYLATE 5 MG/ML
5 INJECTION INTRAMUSCULAR; INTRAVENOUS EVERY 8 HOURS PRN
Status: DISCONTINUED | OUTPATIENT
Start: 2025-05-13 | End: 2025-05-14

## 2025-05-13 RX ORDER — ALPRAZOLAM 0.5 MG
0.5 TABLET ORAL AS NEEDED
Status: DISCONTINUED | OUTPATIENT
Start: 2025-05-13 | End: 2025-05-14

## 2025-05-13 RX ORDER — ONDANSETRON 2 MG/ML
4 INJECTION INTRAMUSCULAR; INTRAVENOUS ONCE
Status: COMPLETED | OUTPATIENT
Start: 2025-05-13 | End: 2025-05-13

## 2025-05-13 RX ORDER — MORPHINE SULFATE 2 MG/ML
2 INJECTION, SOLUTION INTRAMUSCULAR; INTRAVENOUS EVERY 2 HOUR PRN
Status: DISCONTINUED | OUTPATIENT
Start: 2025-05-13 | End: 2025-05-14

## 2025-05-13 RX ORDER — SODIUM CHLORIDE 9 MG/ML
INJECTION, SOLUTION INTRAVENOUS CONTINUOUS
Status: DISCONTINUED | OUTPATIENT
Start: 2025-05-13 | End: 2025-05-14

## 2025-05-13 RX ORDER — ACETAMINOPHEN 325 MG/1
650 TABLET ORAL EVERY 4 HOURS PRN
Status: DISCONTINUED | OUTPATIENT
Start: 2025-05-13 | End: 2025-05-14

## 2025-05-13 RX ORDER — POLYETHYLENE GLYCOL 3350 17 G/17G
17 POWDER, FOR SOLUTION ORAL DAILY PRN
Status: DISCONTINUED | OUTPATIENT
Start: 2025-05-13 | End: 2025-05-14

## 2025-05-13 RX ORDER — MORPHINE SULFATE 2 MG/ML
1 INJECTION, SOLUTION INTRAMUSCULAR; INTRAVENOUS EVERY 2 HOUR PRN
Status: DISCONTINUED | OUTPATIENT
Start: 2025-05-13 | End: 2025-05-14

## 2025-05-13 RX ORDER — ROSUVASTATIN CALCIUM 5 MG/1
5 TABLET, COATED ORAL NIGHTLY
Status: DISCONTINUED | OUTPATIENT
Start: 2025-05-13 | End: 2025-05-14

## 2025-05-13 NOTE — H&P
[facility]     Iliana Ryan Patient Status:  Emergency    1985 MRN [id number]   Location [facility] EMERGENCY DEPARTMENT Attending Christal Boyd MD   Hosp Day # 0 PCP PHYSICIAN NONSTAFF     Chief Complaint: flank pain    Subjective:    History of Present Illness:     Iliana Ryan is a 39 year old female with medical history of left nephrolithiasis , hypothyroidism, and hyperlipidemia who presents to the ER with complaints of left sided flank pain and suprapubic pressure.  She had a cystoscopy with stent placement earlier this month.  She was feeling well right after her procedure. She started to have left flank pain again on Friday and was seen in the ER.  She had a X-ray which showed that the stones had moved.  She started having pain again this morning.  She had associated hematuria as well.  She denies a fever, chills, headaches, dizziness or diarrhea.    History/Other:    Past Medical History:  Past Medical History[1]  Past Surgical History:   Past Surgical History[2]   Family History:   Family History[3]  Social History:    reports that she has never smoked. She has never used smokeless tobacco. She reports current alcohol use. She reports that she does not use drugs.     Allergies: Allergies[4]    Medications:  Medications Ordered Prior to Encounter[5]    Review of Systems:   A comprehensive review of systems was completed.    Pertinent positives and negatives noted in the HPI.    Objective:   Physical Exam:    BP (!) 148/94   Pulse 75   Temp 97.1 °F (36.2 °C) (Temporal)   Resp 26   LMP 2023 (Approximate)   SpO2 100%   General: No acute distress, Alert  Respiratory: No rhonchi, no wheezes  Cardiovascular: S1, S2. Regular rate and rhythm  Abdomen: Soft, Non-tender, non-distended, positive bowel sounds  Neuro: No new focal deficits  Extremities: No edema      Results:    Labs:      Labs Last 24 Hours:    Recent Labs   Lab  05/09/25  1155 05/13/25  1200   RBC 4.60 4.48   HGB 10.4* 10.4*   HCT 33.7* 32.5*   MCV 73.3* 72.5*   MCH 22.6* 23.2*   MCHC 30.9* 32.0   RDW 15.0 14.8   NEPRELIM 5.77 9.83*   WBC 8.2 11.5*   .0 411.0       Recent Labs   Lab 05/09/25  1155 05/13/25  1200   * 137*   BUN 11 7*   CREATSERUM 0.79 0.80   EGFRCR 97 95   CA 9.4 9.5   ALB 5.1* 5.0*    136   K 4.1 4.2    105   CO2 24.0 22.0   ALKPHO 44 44   AST 15 14   ALT 13 12   BILT 0.3 0.5   TP 8.0 7.9       Estimated Glomerular Filtration Rate: 95 mL/min/1.73m2 (result from lab).    No results found for: \"PT\", \"INR\"    No results for input(s): \"TROP\", \"TROPHS\", \"CK\" in the last 168 hours.    No results for input(s): \"TROP\", \"PBNP\" in the last 168 hours.    No results for input(s): \"PCT\" in the last 168 hours.    Imaging: Imaging data reviewed in Epic.    Assessment & Plan:      #Left flank pain  #Left nephrolithiasis  #Recent cysto and stent   #Hematuria  -CT to further eval  -NPO  -Urology on consult  -IVF    #Hypothyroidism  -levothyroxine    #Hyperlipidemia  -statin    #Migraines  #Anxiety    #Obesity  -BMI 34.97    #Leukocytosis  -no fever  -follow Cx results    #Chronic microcytic anemia      Plan of care discussed with patient and family  Discussed with ER physician    Renu Baig MD    Supplementary Documentation:     The 21st Century Cures Act makes medical notes like these available to patients in the interest of transparency. Please be advised this is a medical document. Medical documents are intended to carry relevant information, facts as evident, and the clinical opinion of the practitioner. The medical note is intended as peer to peer communication and may appear blunt or direct. It is written in medical language and may contain abbreviations or verbiage that are unfamiliar.                                   [1]   Past Medical History:   Anxiety state    Bigeminy    Calculus of kidney    Depressive disorder    Difficult  intubation    documented 2020 by Dr Villegas and 25 by Dr Ch    Disorder of thyroid    Dysmenorrhea    Elevated LFTs    Essential hypertension    GERD (gastroesophageal reflux disease)    HEADACHES    History of miscarriage, currently pregnant (HCC)    Hx of motion sickness    HYPOTHYROIDISM    Irregular menses    Low-lying placenta (HCC)    Malpositioned IUD    Mental disorder    hx of depression after ectopic pregnancy    Migraines    HANNA (nonalcoholic steatohepatitis)    before , no current problems    Painful lumpy left breast    Palpitations    PONV (postoperative nausea and vomiting)    Post partum depression    hx of after ectopic pregnancy, took meds for about 6 weeks    Pregnancy (HCC)    Pregnancy-induced hypertension (HCC)    Shortness of breath    Sleep apnea    no current treatment    Syncope, near    Unspecified hypothyroidism    Visual impairment    glasses   [2]   Past Surgical History:  Procedure Laterality Date          x2    Cholecystectomy  2020    Gastric bypass,obese<100cm shana-en-y      Laparoscopic salpingostomy Right 2013    right-ectopic    Other surgical history      thyroid removed    Removal gallbladder      Thyroidectomy      nodular goiter    Total abdominal hysterectomy  2023    + ovaries, cervix removed   [3]   Family History  Problem Relation Age of Onset    Diabetes Father     Hypertension Father     Diabetes Maternal Grandfather     Cancer Maternal Grandfather         prostate    Hypertension Maternal Grandfather     Heart Disorder Maternal Grandfather     Lung Disorder Maternal Grandfather     Other (Tumor in bowel) Maternal Aunt     Breast Cancer Other         MGGM   [4] No Known Allergies  [5]   Current Facility-Administered Medications on File Prior to Encounter   Medication Dose Route Frequency Provider Last Rate Last Admin    [COMPLETED] sodium chloride 0.9 % IV bolus 1,000 mL  1,000 mL Intravenous Once Quiana Liu MD    Stopped at 25 1505    [COMPLETED] ondansetron (Zofran) 4 MG/2ML injection 4 mg  4 mg Intravenous Once Quiana Liu MD   4 mg at 25 1314    [COMPLETED] ondansetron (Zofran) 4 MG/2ML injection 4 mg  4 mg Intravenous Once Cuca Santana MD   4 mg at 25 0448    [COMPLETED] sodium chloride 0.9 % IV bolus 1,000 mL  1,000 mL Intravenous Once Cuca Santana MD   Stopped at 25 0624    [COMPLETED] ketorolac (Toradol) 30 MG/ML injection 30 mg  30 mg Intravenous Once Gilbert Pierson MD   30 mg at 25 0512    [COMPLETED] HYDROmorphone (Dilaudid) 1 MG/ML injection 0.5 mg  0.5 mg Intravenous Once Gilbert Pierson MD   0.5 mg at 25 0513    [] sodium chloride 0.9% infusion   Intravenous Continuous Gilbert Pierson MD   Stopped at 25 0900    [] ondansetron (Zofran) 4 MG/2ML injection 4 mg  4 mg Intravenous Q4H PRN Gilbert Pierson MD        [COMPLETED] sodium chloride 0.9% infusion 1,000 mL  1,000 mL Intravenous Once Gilbert Pierson MD   Stopped at 25 0930    [COMPLETED] ceFAZolin (Ancef) 2g in 10mL IV syringe premix  2 g Intravenous On Call to OR Walker Andrade MD   Stopped at 25 0600    [COMPLETED] fentaNYL (Sublimaze) 50 mcg/mL injection             [COMPLETED] ketorolac (Toradol) 15 MG/ML injection 15 mg  15 mg Intravenous Once Darrel Rodríguez MD   15 mg at 25 0644    [COMPLETED] ondansetron (Zofran) 4 MG/2ML injection 4 mg  4 mg Intravenous Once Darrel Rodríguez MD   4 mg at 25 0643     Current Outpatient Medications on File Prior to Encounter   Medication Sig Dispense Refill    HYDROcodone-acetaminophen 5-325 MG Oral Tab Take 1-2 tablets by mouth every 6 (six) hours as needed for Pain. 20 tablet 0    ondansetron (ZOFRAN) 4 mg tablet Take 1 tablet (4 mg total) by mouth every 8 (eight) hours as needed for Nausea. 10 tablet 0    sulfamethoxazole-trimethoprim -160 MG Oral Tab per tablet Take 1 tablet by mouth 2 (two) times  daily for 5 days. Take starting 2 days before surgery with Dr. Andrade 10 tablet 0    oxybutynin 5 MG Oral Tab Take 1 tablet (5 mg total) by mouth 3 (three) times daily as needed (Bladder spasms). Stop 12 hours before Living[place] catheter removal in clinic (if applicable) 15 tablet 0    phenazopyridine (PYRIDIUM) 100 MG Oral Tab Take 1 tablet (100 mg total) by mouth 3 (three) times daily as needed for Pain. This will turn your urine orange. 10 tablet 0    ALPRAZolam 0.5 MG Oral Tab Take 1 tablet (0.5 mg total) by mouth as needed.      rOPINIRole HCl 3 MG Oral Tab Take 2 tablets (6 mg total) by mouth nightly.      Polyethylene Glycol 3350 (MIRALAX) 17 g Oral Powd Pack Take 17 g by mouth daily as needed (Take to avoid constipation, especially if taking narcotic pain medications.). 20 packet 1    [] tamsulosin (FLOMAX) 0.4 MG Oral Cap Take 1 capsule (0.4 mg total) by mouth daily for 7 days. 7 capsule 0    HYDROcodone-acetaminophen 5-325 MG Oral Tab Take 1-2 tablets by mouth every 4 (four) hours as needed for Pain. 20 tablet 0    Naloxone HCl 4 MG/0.1ML Nasal Liquid 4 mg by Nasal route as needed. If patient remains unresponsive, repeat dose in other nostril 2-5 minutes after first dose. 1 kit 0    Naloxone HCl 4 MG/0.1ML Nasal Liquid 4 mg by Intranasal route as needed (May repeat as needed in other nostril if symptoms persist). If patient remains unresponsive, repeat dose in other nostril 2-5 minutes after first dose. 1 kit 0    topiramate 25 MG Oral Tab 1 tab po Qam before breakfast x 1 week, then increase to 2 tabs po Qam before breakfast. 60 tablet 3    Phentermine HCl 37.5 MG Oral Tab Take 1 tablet (37.5 mg total) by mouth every morning before breakfast. 30 tablet 3    buPROPion  MG Oral Tablet 24 Hr 2 tabs po Qam 60 tablet 3    rosuvastatin 5 MG Oral Tab Take 1 tablet (5 mg total) by mouth nightly. 90 tablet 0    Levothyroxine Sodium 300 MCG Oral Tab Take 1 tablet (300 mcg total) by mouth daily. Take  levothyroxine 25mcg with this to equal 325mcg daily. 90 tablet 0    levothyroxine 25 MCG Oral Tab Take 1 tablet (25 mcg total) by mouth before breakfast. Take levothyroxine 300mcg with this to equal 325mcg daily. 90 tablet 0

## 2025-05-13 NOTE — TELEPHONE ENCOUNTER
Patient given recommendation from NANDINI.  Rn instructed the Patient to continue Oxybutynin and Tamsulosin as prescribed.   Avoid constipation with Miralax for soft daily BM. Patient to hydrate with 40- 60 oz of water daily.  Patient informed that intermittent bleeding can be experience with stent in place.  Patient instructed to go to ER if symptoms worsen. Patient agreeable with plan.  This Encounter is now closed.

## 2025-05-13 NOTE — ED QUICK NOTES
Orders for admission, patient is aware of plan and ready to go upstairs. Any questions, please call ED RN magui at extension [phone].     Patient Covid vaccination status: Fully vaccinated     COVID Test Ordered in ED: None    COVID Suspicion at Admission: N/A    Running Infusions: Medication Infusions[1] None    Mental Status/LOC at time of transport: aox4    Other pertinent information:   CIWA score: N/A   NIH score:  N/A             [1]

## 2025-05-13 NOTE — TELEPHONE ENCOUNTER
Phoned Patient to notify hat there was some contamination on their urine.  Given upcoming surgery, Dr. Andrade is recommend antibiotics 2 days before and 3 days after procedure.  Patient agreeable with plan.  Patient reports that she elected to go to ER for pain management and nausea.  This Encounter is now closed.

## 2025-05-13 NOTE — TELEPHONE ENCOUNTER
Patient seen in ER  5/9 for severe back pain, nausea/vomiting.  Lithotripsy and stent exchange 5/20  Currently reports lower back pain an pressure x 2 days, with blood in urine.  Stated taking Zofran and Hydrocodone and not relieved at all. 8/10 pain scale, Denies fever, or constipation. Rn advised Patient that stent can be uncomfortable. Patient reports pain is not manageable. Patient given ER precautions for worsen of symptoms.  Patient agreeable with plan but requesting different pain medications.    NANDINI Rios:  For your Review and recommendation.

## 2025-05-13 NOTE — TELEPHONE ENCOUNTER
----- Message from Nasima MASCORRO sent at 5/8/2025  3:58 PM CDT -----    ----- Message -----  From: Walker Andrade MD  Sent: 5/8/2025   1:59 PM CDT  To: [facility] ;  Urology Cl#    Please let patient know that there was some contamination on their urine.  Given upcoming surgery, I recommend antibiotics 2 days before and 3 days after procedure.  This has been sent to the   pharmacy.  Thank you.  ----- Message -----  From: Lab, Background User  Sent: 5/7/2025   1:16 PM CDT  To: Walker Andrade MD

## 2025-05-13 NOTE — ED QUICK NOTES
Assumed care. VSS at this time. Pt aware she is waiting for a clean room. All other needs met at this time.

## 2025-05-13 NOTE — TELEPHONE ENCOUNTER
Make sure she is taking tamsulosin, oxybutynin.  Daily Bms needed take miralax or other if needed to make sure daily soft.  Stay hydrated  Give reassurance that blood is common, intermittent until stent removed.

## 2025-05-13 NOTE — TELEPHONE ENCOUNTER
Per patient continues to be in a lot of pain due to stent, also states she continues to vomit even though she takes zofran, requesting to speak to RN. Please call thank you.

## 2025-05-13 NOTE — ED PROVIDER NOTES
Patient Seen in: [facility] Emergency Department      History     Chief Complaint   Patient presents with    Nausea     Stated Complaint: kidney stent - severe pain and nausea has pain meds at home without relief     Subjective:   HPI  History of Present Illness            Patient presents with severe left abdomen/flank pain.  The patient had a left ureteral stent placed on May 1.  She has a large kidney stone and is scheduled to go in for a second procedure and stent removal on the 20th.  She was here 1 time previously with intractable pain.  She states that it was better but now is much worse.  She states it started at 1 AM and is more lower in the abdomen now as where it was previously isolated to the flank.  She tried Norco without relief.  She has been dry heaving.  She also notes she has had kenna hematuria since yesterday.  She feels bladder pressure and urgency but no dysuria.  She denies fever.      Objective:     Past Medical History:    Anxiety state    Bigeminy    Calculus of kidney    Depressive disorder    Difficult intubation    documented 7/14/2020 by Dr Villegas and 5/1/25 by Dr Ch    Disorder of thyroid    Dysmenorrhea    Elevated LFTs    Essential hypertension    GERD (gastroesophageal reflux disease)    HEADACHES    History of miscarriage, currently pregnant ([facility])    Hx of motion sickness    HYPOTHYROIDISM    Irregular menses    Low-lying placenta ([facility])    Malpositioned IUD    Mental disorder    hx of depression after ectopic pregnancy    Migraines    HANNA (nonalcoholic steatohepatitis)    before 2020, no current problems    Painful lumpy left breast    Palpitations    PONV (postoperative nausea and vomiting)    Post partum depression    hx of after ectopic pregnancy, took meds for about 6 weeks    Pregnancy ([facility])    Pregnancy-induced hypertension ([facility])    Shortness of breath    Sleep apnea    no current treatment    Syncope, near    Unspecified hypothyroidism    Visual impairment     glasses              Past Surgical History:   Procedure Laterality Date          x2    Cholecystectomy  2020    Gastric bypass,obese<100cm shana-en-y      Laparoscopic salpingostomy Right 2013    right-ectopic    Other surgical history      thyroid removed    Removal gallbladder      Thyroidectomy  2005    nodular goiter    Total abdominal hysterectomy  2023    + ovaries, cervix removed                No pertinent social history.                              Physical Exam     ED Triage Vitals [25 1154]   BP (!) 168/113   Pulse 78   Resp 20   Temp 97.1 °F (36.2 °C)   Temp src Temporal   SpO2 97 %   O2 Device None (Room air)       Current Vitals:   Vital Signs  BP: (!) 139/93  Pulse: 70  Resp: 18  Temp: 97.1 °F (36.2 °C)  Temp src: Temporal  MAP (mmHg): (!) 108    Oxygen Therapy  SpO2: 100 %  O2 Device: None (Room air)          Physical Exam  General: Alert and oriented x3 in significant distress due to pain and dry heaving.  Cardiovascular: Regular rate and rhythm, no murmurs.  Respiratory: Lungs clear to auscultation.  Abdomen: Soft, mild tenderness in the left abdomen, no rebound or guarding, normal active bowel sounds, no CVA tenderness.  Extremities: No CCE.  Skin: Warm and dry.        ED Course     Labs Reviewed   CBC WITH DIFFERENTIAL WITH PLATELET - Abnormal; Notable for the following components:       Result Value    WBC 11.5 (*)     HGB 10.4 (*)     HCT 32.5 (*)     MCV 72.5 (*)     MCH 23.2 (*)     Neutrophil Absolute Prelim 9.83 (*)     Neutrophil Absolute 9.83 (*)     Lymphocyte Absolute 0.99 (*)     All other components within normal limits   COMP METABOLIC PANEL (14) - Abnormal; Notable for the following components:    Glucose 137 (*)     BUN 7 (*)     Albumin 5.0 (*)     All other components within normal limits   URINALYSIS, ROUTINE - Abnormal; Notable for the following components:    Clarity Urine Turbid (*)     Blood Urine 3+ (*)     Protein Urine 100 (*)      Urobilinogen Urine 2 (*)     Leukocyte Esterase Urine 75 (*)     WBC Urine 21-50 (*)     RBC Urine >10 (*)     Bacteria Urine Rare (*)     Squamous Epi. Cells Few (*)     Ca Oxalate Crystals Few (*)     All other components within normal limits    Narrative:     k   RAINBOW DRAW LAVENDER   RAINBOW DRAW LIGHT GREEN   RAINBOW DRAW BLUE   RAINBOW DRAW GOLD   URINE CULTURE, ROUTINE        Results            Medications   HYDROmorphone (Dilaudid) 1 MG/ML injection 1 mg (1 mg Intravenous Given 5/13/25 1420)   ondansetron (Zofran) 4 MG/2ML injection 4 mg (4 mg Intravenous Given 5/13/25 1241)   sodium chloride 0.9 % IV bolus 1,000 mL (0 mL Intravenous Stopped 5/13/25 1444)   metoclopramide (Reglan) 5 mg/mL injection 10 mg (10 mg Intravenous Given 5/13/25 1448)   diphenhydrAMINE (Benadryl) 50 mg/mL  injection 25 mg (25 mg Intravenous Given 5/13/25 1448)     Patient was given pain and nausea medicine with improvement in the symptoms but she still had moderate pain and nausea and it seemed to come back when the meds were wearing off.  The patient was able to effectively empty her bladder and did not have evidence of urinary retention.                  MDM      Patient presents with left abdomen/flank pain and nausea.  Differential diagnosis includes but is not limited to stent pain, kidney stone pain, urinary tract infection and acute renal failure.  The patient has normal renal function on her chemistry panel.  Her hemoglobin is stable but she does have a slight leukocytosis.  She does not have a fever and her UA does show some evidence of infection but also appears contaminated.  Her specimen has been sent for culture.  I discussed further imaging with urology and they did not feel that it would be necessary at this time.  They understand that the patient needs admission for pain control and will consult while in the hospital.  I notified Dr. Andrade as well as Blanca Mann per his request.  The patient will be admitted  primarily to Dr. Baig from the hospitalist service and we have discussed the case as well.    Admission disposition: 5/13/2025  3:54 PM           Medical Decision Making      Disposition and Plan     Clinical Impression:  1. Intractable abdominal pain    2. Gross hematuria         Disposition:  Admit  5/13/2025  3:54 pm    Follow-up:  No follow-up provider specified.        Medications Prescribed:  Current Discharge Medication List                Supplementary Documentation:         Hospital Problems       Present on Admission  Date Reviewed: 4/20/2025          ICD-10-CM Noted POA    * (Principal) Intractable abdominal pain R10.9 5/1/2025 Unknown    Anemia D64.9 5/13/2025 Yes    Hyperglycemia R73.9 5/13/2025 Yes

## 2025-05-13 NOTE — ED INITIAL ASSESSMENT (HPI)
39YF c/c of pain Pt state that she had a kidney stent on May 1st Pt state that she here today with increased pain and nausea

## 2025-05-14 VITALS
OXYGEN SATURATION: 96 % | SYSTOLIC BLOOD PRESSURE: 134 MMHG | HEART RATE: 73 BPM | DIASTOLIC BLOOD PRESSURE: 82 MMHG | TEMPERATURE: 99 F | RESPIRATION RATE: 21 BRPM

## 2025-05-14 LAB
ANION GAP SERPL CALC-SCNC: 7 MMOL/L (ref 0–18)
BASOPHILS # BLD AUTO: 0.03 X10(3) UL (ref 0–0.2)
BASOPHILS NFR BLD AUTO: 0.3 %
BUN BLD-MCNC: 7 MG/DL (ref 9–23)
CALCIUM BLD-MCNC: 9.1 MG/DL (ref 8.7–10.6)
CHLORIDE SERPL-SCNC: 108 MMOL/L (ref 98–112)
CO2 SERPL-SCNC: 26 MMOL/L (ref 21–32)
CREAT BLD-MCNC: 0.84 MG/DL (ref 0.55–1.02)
EGFRCR SERPLBLD CKD-EPI 2021: 90 ML/MIN/1.73M2 (ref 60–?)
EOSINOPHIL # BLD AUTO: 0.15 X10(3) UL (ref 0–0.7)
EOSINOPHIL NFR BLD AUTO: 1.6 %
ERYTHROCYTE [DISTWIDTH] IN BLOOD BY AUTOMATED COUNT: 14.9 %
GLUCOSE BLD-MCNC: 109 MG/DL (ref 70–99)
HCT VFR BLD AUTO: 31.2 % (ref 35–48)
HGB BLD-MCNC: 9.5 G/DL (ref 12–16)
IMM GRANULOCYTES # BLD AUTO: 0.05 X10(3) UL (ref 0–1)
IMM GRANULOCYTES NFR BLD: 0.5 %
LYMPHOCYTES # BLD AUTO: 2.04 X10(3) UL (ref 1–4)
LYMPHOCYTES NFR BLD AUTO: 22.1 %
MCH RBC QN AUTO: 22.7 PG (ref 26–34)
MCHC RBC AUTO-ENTMCNC: 30.4 G/DL (ref 31–37)
MCV RBC AUTO: 74.5 FL (ref 80–100)
MONOCYTES # BLD AUTO: 0.71 X10(3) UL (ref 0.1–1)
MONOCYTES NFR BLD AUTO: 7.7 %
NEUTROPHILS # BLD AUTO: 6.25 X10 (3) UL (ref 1.5–7.7)
NEUTROPHILS # BLD AUTO: 6.25 X10(3) UL (ref 1.5–7.7)
NEUTROPHILS NFR BLD AUTO: 67.8 %
OSMOLALITY SERPL CALC.SUM OF ELEC: 291 MOSM/KG (ref 275–295)
PLATELET # BLD AUTO: 344 10(3)UL (ref 150–450)
POTASSIUM SERPL-SCNC: 4 MMOL/L (ref 3.5–5.1)
RBC # BLD AUTO: 4.19 X10(6)UL (ref 3.8–5.3)
SODIUM SERPL-SCNC: 141 MMOL/L (ref 136–145)
WBC # BLD AUTO: 9.2 X10(3) UL (ref 4–11)

## 2025-05-14 PROCEDURE — 99222 1ST HOSP IP/OBS MODERATE 55: CPT | Performed by: PHYSICIAN ASSISTANT

## 2025-05-14 PROCEDURE — 99239 HOSP IP/OBS DSCHRG MGMT >30: CPT | Performed by: HOSPITALIST

## 2025-05-14 RX ORDER — DIAZEPAM 5 MG/1
5 TABLET ORAL EVERY 6 HOURS PRN
Status: DISCONTINUED | OUTPATIENT
Start: 2025-05-14 | End: 2025-05-14

## 2025-05-14 RX ORDER — LEVOTHYROXINE SODIUM 300 UG/1
300 TABLET ORAL DAILY
Status: SHIPPED | COMMUNITY
Start: 2025-05-14

## 2025-05-14 RX ORDER — LEVOTHYROXINE SODIUM 25 UG/1
25 TABLET ORAL
Status: SHIPPED | COMMUNITY
Start: 2025-05-14

## 2025-05-14 RX ORDER — CEPHALEXIN 500 MG/1
500 CAPSULE ORAL 4 TIMES DAILY
Qty: 20 CAPSULE | Refills: 0 | Status: SHIPPED | OUTPATIENT
Start: 2025-05-14 | End: 2025-05-19

## 2025-05-14 RX ORDER — DIAZEPAM 5 MG/1
5 TABLET ORAL EVERY 6 HOURS PRN
Qty: 30 TABLET | Refills: 0 | Status: SHIPPED | OUTPATIENT
Start: 2025-05-14

## 2025-05-14 NOTE — PROGRESS NOTES
[facility]   part of [facility]     Hospitalist Progress Note     Iliana Ryan Patient Status:  Observation    1985 MRN [id number]   Location [facility] 3SW-A Attending Renu Baig MD   Hosp Day # 0 PCP PHYSICIAN NONSTAFF     Chief Complaint: stent pain    Subjective:     Patient reports she is feeling better,  pain is improving, has a mild headache    Objective:    Review of Systems:   A comprehensive review of systems was completed; pertinent positive and negatives stated in subjective.    Vital signs:  Temp:  [97.8 °F (36.6 °C)-98.7 °F (37.1 °C)] 98.6 °F (37 °C)  Pulse:  [68-86] 73  Resp:  [16-26] 21  BP: (134-162)/() 134/82  SpO2:  [95 %-100 %] 96 %    Physical Exam:    General: No acute distress  Respiratory: No wheezes, no rhonchi  Cardiovascular: S1, S2, regular rate and rhythm  Abdomen: Soft, Non-tender, non-distended, positive bowel sounds  Neuro: No new focal deficits.   Extremities: No edema      Diagnostic Data:    Labs:  Recent Labs   Lab 25  1200 25  0448   WBC 8.2 11.5* 9.2   HGB 10.4* 10.4* 9.5*   MCV 73.3* 72.5* 74.5*   .0 411.0 344.0       Recent Labs   Lab 25  1200 25  0448   * 137* 109*   BUN 11 7* 7*   CREATSERUM 0.79 0.80 0.84   CA 9.4 9.5 9.1   ALB 5.1* 5.0*  --     136 141   K 4.1 4.2 4.0    105 108   CO2 24.0 22.0 26.0   ALKPHO 44 44  --    AST 15 14  --    ALT 13 12  --    BILT 0.3 0.5  --    TP 8.0 7.9  --        Estimated Glomerular Filtration Rate: 90 mL/min/1.73m2 (result from lab).    No results for input(s): \"TROP\", \"TROPHS\", \"CK\" in the last 168 hours.    No results for input(s): \"PTP\", \"INR\" in the last 168 hours.               Microbiology    No results found for this visit on 25.      Imaging: Reviewed in Epic.    Medications: Scheduled Medications[1]    Assessment & Plan:      #Left flank pain  #Left nephrolithiasis  #Recent cysto and stent    #Hematuria  -CT negative for new changes  -Urology on consult  -trial of valium for spasms     #Hypothyroidism  -levothyroxine     #Hyperlipidemia  -statin     #Migraines  #Anxiety     #Obesity  -BMI 34.97     #Leukocytosis  -no fever  -follow Cx results  -empiric Abx     #Chronic microcytic anemia      Renu Baig MD    Supplementary Documentation:     Quality:  DVT Mechanical Prophylaxis:   SCDs,    DVT Pharmacologic Prophylaxis   Medication   None                Code Status: Prior  Living[place]: No urinary catheter in place  Livingston Duration (in days):   Central line:    CARIDAD: 5/14/2025    Discharge is dependent on: progress  At this point Mrs. Ryan is expected to be discharge to: home    The 21st Century Cures Act makes medical notes like these available to patients in the interest of transparency. Please be advised this is a medical document. Medical documents are intended to carry relevant information, facts as evident, and the clinical opinion of the practitioner. The medical note is intended as peer to peer communication and may appear blunt or direct. It is written in medical language and may contain abbreviations or verbiage that are unfamiliar.                         [1]    cefTRIAXone  2 g Intravenous Q24H    buPROPion ER  300 mg Oral Daily    levothyroxine  25 mcg Oral Before breakfast    Levothyroxine Sodium  300 mcg Oral Before breakfast    rOPINIRole HCl  6 mg Oral Nightly    rosuvastatin  5 mg Oral Nightly    tamsulosin  0.4 mg Oral Nightly    topiramate  50 mg Oral Daily

## 2025-05-14 NOTE — CONSULTS
[facility]   part of [facility]    Report of Consultation    Iliana Ryan Patient Status:  Observation    1985 MRN [id number]   Location [facility] 3SW-A Attending Renu Baig MD   Hosp Day # 0 PCP PHYSICIAN NONSTAFF     Date of Admission:  2025  Date of Consult:  2025    Reason for Consultation:  Left ureteral stone with hydronephrosis s/p left ureteral stent placement 25    History of Present Illness:  Iliana Ryan is a a(n) 39 year old female with hx of hypothyroidism, KHLOE, migraines, HTN, depression, who presented to the ED yesterday for worsening left sided abdominal pain. She was afebrile, hypertensive. Labs showed mild white count, normal serum creatinine, and urinalysis c/w ureteral stent. She had been seen 25 at the ED for pain, prescribed Norco and pain controlled. Urine culture was negative and KUB with stent in proper position. Urology was contacted from ED and trial of valium and possible cline placement discussed. Due to ongoing pain she was admitted for further evaluation and mgmt. CT scan was obtained on the floor that showed left ureteral stent in proper position, very mild hydronephrosis, and previously noted proximal ureteral stone now in mid-distal location.     Patient has received dilaudid and morphine for pain. She was taking oxybutynin and tamsulosin at home but has not been given since admission. Has not been taking any NSAIDS due to gastric bypass and upcoming surgery.     Notes that she has had increased gross hematuria over the last 48 hours. Currently feeling comfortable but required dilaudid overnight. Repeat PVR 15mL. Denies any constipation.       History:  Past Medical History[1]  Past Surgical History[2]  Family History[3]   reports that she has never smoked. She has never used smokeless tobacco. She reports current alcohol use. She reports that she does not use  drugs.    Allergies:  Allergies[4]    Medications:  Current Hospital Medications[5]    Review of Systems:  Pertinent items are noted in HPI.    Physical Exam:  /82 (BP Location: Right arm)   Pulse 73   Temp 98.6 °F (37 °C) (Oral)   Resp 21   LMP 09/29/2023 (Approximate)   SpO2 96%   Back: mild Left CVAT  Lungs: non labored respirations  Abdomen: mild LLQ tenderness  Neurologic: Grossly normal  Psych appropriate affect and mood    Laboratory Data:  Lab Results   Component Value Date    WBC 9.2 05/14/2025    HGB 9.5 05/14/2025    HCT 31.2 05/14/2025    .0 05/14/2025    CREATSERUM 0.84 05/14/2025    BUN 7 05/14/2025     05/14/2025    K 4.0 05/14/2025     05/14/2025    CO2 26.0 05/14/2025     05/14/2025    CA 9.1 05/14/2025    ALB 5.0 05/13/2025    ALKPHO 44 05/13/2025    BILT 0.5 05/13/2025    TP 7.9 05/13/2025    AST 14 05/13/2025    ALT 12 05/13/2025       Urinalysis Results (last three years):  Recent Labs     02/26/23  0836 01/13/25  1452 04/30/25  0637 05/01/25  0559 05/09/25  1155 05/13/25  1416   COLORUR Other* Yellow Yellow Light-Yellow Dark-Yellow Light-Yellow   CLARITY Slightly Cloudy* Clear Slightly Cloudy* Clear Clear Turbid*   SPECGRAVITY 1.015 1.010 1.020 1.015 1.010 1.017   PHURINE 6.0 5.5 5.5 5.0 6.0 7.5   PROUR 30 mg/dL* Negative Negative Negative 50* 100*   GLUUR Negative Negative Negative Normal Normal Normal   KETUR Negative Negative Negative Negative Negative Negative   BILUR Negative Negative Negative Negative Negative Negative   BLOODURINE Large* Negative Large* 2+* 3+* 3+*   NITRITE Negative Negative Negative Negative 1+* Negative   UROBILINOGEN 0.2 0.2 0.2 Normal Normal 2*   LEUUR Trace* Negative Negative Negative Negative 75*   WBCUR 6-10*  --  1-5 1-5 6-10* 21-50*   RBCUR >10*  --  >10* >10* >10* >10*   BACUR 1+*  --  1+* None Seen None Seen Rare*       Urine Culture Results (last three years):  Lab Results   Component Value Date    URINECUL No Growth at  18-24 hrs. 05/09/2025    URINECUL  05/06/2025     <10,000 cfu/ml Multiple species present- probable contamination.    URINECUL  02/26/2023     <10,000 cfu/ml Multiple species present- probable contamination.    URINECUL  09/26/2020     10-50,000 cfu/ml Multiple species present-probable contamination.    URINECUL >100,000 CFU/ML Gram positive tony 10/02/2018    URINECUL No Growth 1 Day 03/11/2017    URINECUL 40,000 CFU/ML Mixed gram positive tony 01/22/2017    URINECUL 40,000 CFU/ML Mixed gram positive tony 07/17/2016       Imaging  CT ABDOMEN+PELVIS KIDNEYSTONE 2D RNDR(NO IV,NO ORAL)(CPT=74176)  Result Date: 5/13/2025  PROCEDURE:  CT ABDOMEN+PELVIS KIDNEYSTONE 2D RNDR(NO IV,NO ORAL)(CPT=74176)  COMPARISON:  [facility], CT, CT ABDOMEN+PELVIS KIDNEYSTONE 2D RNDR(NO IV,NO ORAL)(CPT=74176), 4/30/2025, 7:40 AM.  INDICATIONS:  Follow-up for left sided obstructive uropathy with ureteral stent placement.  TECHNIQUE:  Unenhanced multislice CT scanning from above the kidneys to below the urinary bladder.  2D rendering are generated on the CT scanner workstation to localize potential stones in the cranio-caudal plane.  Dose reduction techniques were used. Dose information is transmitted to the ACR (American College of Radiology) NRDR (National Radiology Data Registry) which includes the Dose Index Registry.  PATIENT STATED HISTORY: (As transcribed by Technologist)  Stent evaluation.    FINDINGS:  There has been interval placement of a left-sided ureteral stent.  There is a stable degree of mild left hydronephrosis with increasing left perinephric and periureteral fat stranding.  The previously noted obstructing stone within the proximal to mid left ureter has migrated into the distal 3rd of the left ureter.  Multiple bilateral nonobstructing renal stones are again noted.  Normal appearance of the liver, pancreas and adrenal glands.  Patient is status post cholecystectomy without biliary ductal dilatation.  The spleen is  enlarged measuring 13.4 cm in AP dimension.  No focal splenic lesions.  There is evidence of a gastric bypass.  The duodenum sweep and small bowel are unremarkable.  The colon is decompressed.  No free intraperitoneal air or fluid identified.  No acute osseous abnormalities.  There are moderate degenerative disc changes at L5-S1.            CONCLUSION:  1. There has been interval placement of a left-sided ureteral stent, with a stable degree of left hydronephrosis and increasing left perinephric and periureteral fat stranding. 2. The previously noted obstructing stone within the proximal to mid left ureter has migrated into the distal 3rd of the left ureter. 3. Stable multiple bilateral nonobstructing renal stones.    LOCATION:  [facility]   Dictated by ([facility]): Augustine Mckeon DO on 5/13/2025 at 10:03 PM     Finalized by ([facility]): Augustine Mckeon DO on 5/13/2025 at 10:07 PM       XR ABDOMEN (1 VIEW) (CPT=74018)  Result Date: 5/9/2025  PROCEDURE:  XR ABDOMEN (1 VIEW) (CPT=74018)  INDICATIONS:  Left ureteral stent.  COMPARISON:  [facility], CT, CT ABDOMEN+PELVIS KIDNEYSTONE 2D RNDR(NO IV,NO ORAL)(CPT=74176), 4/30/2025, 7:40 AM.  [facility], XR, XR ABDOMEN, OBSTRUCTIVE SERIES (CPT=74020), 3/11/2017, 1:36 PM.  TECHNIQUE:  Supine AP view was obtained.  PATIENT STATED HISTORY: (As transcribed by Technologist)  Left ureteral stent. Increased pain and vomiting.  Evaluate for position.    FINDINGS:   A left ureteral stent is seen.  There is a possible 4 mm stone projecting over the distal left aspect of the left ureteral stent.  Alternatively, this could represent a phlebolith.  Clinical correlation with the patient's symptoms is recommended.  There are small stones projecting over the kidneys. If the patient's symptoms persist or worsen, consider further assessment with ultrasound or CT.            CONCLUSION:  See above.   LOCATION:  [id number]   Dictated by ([facility]): Stromberg, LeRoy, MD on 5/09/2025 at 1:57 PM     Finalized by ([facility]):  Stromberg, LeRoy, MD on 5/09/2025 at 2:01 PM       XR OR - N/C  Result Date: 5/1/2025  PROCEDURE:  XR OR - N/C  COMPARISON:  None.  INDICATIONS:  Cystogram  TECHNIQUE:   FLUOROSCOPY IMAGES OBTAINED:  7 FLUOROSCOPY TIME:  38 seconds TECHNOLOGIST TIME:  1 hour RADIATION DOSE (AIR KERMA PRODUCT):  454.3uGy/m2   FINDINGS:   7 intraoperative fluoroscopic image(s) submitted for interpretation postoperatively.  Image(s) demonstrate cannulation of the right ureter.  Placement of right-sided nephroureteral stent.. Correlation with real time fluoroscopy examination is recommended. See operative report for further details.            CONCLUSION: Intraoperative images for operative control.  LOCATION:  [facility]    Dictated by ([facility]): Richie Almonte MD on 5/01/2025 at 10:20 PM     Finalized by ([facility]): Richie Almonte MD on 5/01/2025 at 10:21 PM       CT ABDOMEN+PELVIS KIDNEYSTONE 2D RNDR(NO IV,NO ORAL)(CPT=74176)  Result Date: 4/30/2025  PROCEDURE:  CT ABDOMEN+PELVIS KIDNEYSTONE 2D RNDR(NO IV,NO ORAL)(CPT=74176)  COMPARISON:  [facility], CT, CT ABDOMEN+PELVIS(CONTRAST ONLY)(CPT=74177), 1/13/2025, 3:56 PM.  [facility], CT, CT ABDOMEN+PELVIS KIDNEYSTONE 2D RNDR(NO IV,NO ORAL)(CPT=74176), 2/26/2023, 9:16 AM.  INDICATIONS:  Left low back pain - denies taking medication pta - hx kidney stones  TECHNIQUE:  Unenhanced multislice CT scanning from above the kidneys to below the urinary bladder.  2D rendering are generated on the CT scanner workstation to localize potential stones in the cranio-caudal plane.  Dose reduction techniques were used. Dose information is transmitted to the ACR (American College of Radiology) NRDR (National Radiology Data Registry) which includes the Dose Index Registry.  PATIENT STATED HISTORY: (As transcribed by Technologist)  Acute right flank pain, Hx of kidney stones.    FINDINGS:  LUNG BASES:  Lung bases are clear. LIVER:  Normal in shape and contour but limited evaluation without contrast. BILIARY:    Cholecystectomy clips.  No intrahepatic biliary dilatation.. SPLEEN:  Normal.  No enlargement or focal lesion. PANCREAS:  No mino-pancreatic inflammatory stranding ADRENALS:  Normal.  No mass or enlargement.  KIDNEYS:  Mild left-sided hydronephrosis.  There is a 5 mm proximal left ureteral calculus.  Nonobstructing bilateral renal calculi. BOWEL/MESENTERY:  Bowel is normal in caliber. No evidence of obstruction.  Postoperative changes from gastric bypass PELVIS:  Bladder is unremarkable in appearance.  Calcified phleboliths in the pelvis.   AORTA/VASCULAR:  Aorta is normal in caliber. BONES:  Degenerative changes greatest at L5-S1 level.            CONCLUSION:  1. Mild left-sided hydronephrosis with a 5 mm proximal left ureteral calculus. 2. Nonobstructing bilateral renal calculi.    LOCATION:  [id number]   Dictated by ([facility]): Richie Almonte MD on 4/30/2025 at 7:54 AM     Finalized by ([facility]): Richie Almonte MD on 4/30/2025 at 7:57 AM             Impression:  Problem List[6]    39 year old female with hx of hypothyroidism, KHLOE, migraines, HTN, depression, who presented to the ED yesterday for worsening left sided abdominal pain. She was afebrile, hypertensive. Labs showed mild white count, normal serum creatinine, and urinalysis c/w ureteral stent. She had been seen 5/9/25 at the ED for pain, prescribed Norco and pain controlled. Urine culture was negative and KUB with stent in proper position. Urology was contacted from ED and trial of valium and possible cline placement discussed. Due to ongoing pain she was admitted for further evaluation and mgmt. CT scan was obtained on the floor that showed left ureteral stent in proper position, very mild hydronephrosis, and previously noted proximal ureteral stone now in mid-distal location.     Reviewed with patient likely contributing factors. At this time no indication for  surgical intervention. We discussed other options of stent management including use of valium,  NSAIDS (scantly due to gastric bypass and upcoming surgery), or cline catheter.     Recommendations:  Continue current regimen, add valium. If not controlled could use NSAIDS sparingly or consider cline catheter placement. Agree with covering with abx empirically, await final culture.     Reviewed with patient, nursing staff, and hospitalist.    Would be okay to d'c from  standpoint if pain controlled and cleared by hospitalist services. Plan to proceed with surgery as scheduled.     Thank you for allowing me to participate in the care of your patient.    Blanca Kenyon PA-C  [facility]  2025           [1]   Past Medical History:   Anxiety state    Bigeminy    Calculus of kidney    Depressive disorder    Difficult intubation    documented 2020 by Dr Villegas and 25 by Dr Ch    Disorder of thyroid    Dysmenorrhea    Elevated LFTs    Essential hypertension    GERD (gastroesophageal reflux disease)    HEADACHES    High cholesterol    History of miscarriage, currently pregnant (HCC)    Hx of motion sickness    HYPOTHYROIDISM    Irregular menses    Low-lying placenta (HCC)    Malpositioned IUD    Mental disorder    hx of depression after ectopic pregnancy    Migraines    HANNA (nonalcoholic steatohepatitis)    before , no current problems    Painful lumpy left breast    Palpitations    PONV (postoperative nausea and vomiting)    Post partum depression    hx of after ectopic pregnancy, took meds for about 6 weeks    Pregnancy (HCC)    Pregnancy-induced hypertension (HCC)    Sleep apnea    no current treatment    Syncope, near    Unspecified hypothyroidism    Visual impairment    glasses   [2]   Past Surgical History:  Procedure Laterality Date          x2    Cholecystectomy  2020    Gastric bypass,obese<100cm [id number]      Laparoscopic salpingostomy Right     right-ectopic    Other surgical history      thyroid removed    Removal gallbladder       Thyroidectomy  2005    nodular goiter    Total abdominal hysterectomy  12/2023    + ovaries, cervix removed   [3]   Family History  Problem Relation Age of Onset    Diabetes Father     Hypertension Father     Diabetes Maternal Grandfather     Cancer Maternal Grandfather         prostate    Hypertension Maternal Grandfather     Heart Disorder Maternal Grandfather     Lung Disorder Maternal Grandfather     Other (Tumor in bowel) Maternal Aunt     Breast Cancer Other         MGGM   [4] No Known Allergies  [5]   Current Facility-Administered Medications:     cefTRIAXone (Rocephin) 1 g in sodium chloride 0.9% 100 mL IVPB-ADDV, 1 g, Intravenous, Q24H    ALPRAZolam (Xanax) tab 0.5 mg, 0.5 mg, Oral, PRN    buPROPion ER (Wellbutrin XL) 24 hr tab 300 mg, 300 mg, Oral, Daily    levothyroxine (Synthroid) tab 25 mcg, 25 mcg, Oral, Before breakfast    levothyroxine (Synthroid) tab 300 mcg, 300 mcg, Oral, Before breakfast    oxybutynin (Ditropan) tab 5 mg, 5 mg, Oral, TID PRN    phenazopyridine (Pyridium) tab 100 mg, 100 mg, Oral, TID PRN    rOPINIRole (Requip) tab 6 mg, 6 mg, Oral, Nightly    rosuvastatin (Crestor) tab 5 mg, 5 mg, Oral, Nightly    tamsulosin (Flomax) cap 0.4 mg, 0.4 mg, Oral, Nightly    topiramate (TopaMAX) tab 50 mg, 50 mg, Oral, Daily    sodium chloride 0.9% infusion, , Intravenous, Continuous    acetaminophen (Tylenol) tab 650 mg, 650 mg, Oral, Q4H PRN **OR** HYDROcodone-acetaminophen (Norco) 5-325 MG per tab 1 tablet, 1 tablet, Oral, Q4H PRN **OR** HYDROcodone-acetaminophen (Norco) 5-325 MG per tab 2 tablet, 2 tablet, Oral, Q4H PRN    morphINE PF 2 MG/ML injection 1 mg, 1 mg, Intravenous, Q2H PRN **OR** morphINE PF 2 MG/ML injection 2 mg, 2 mg, Intravenous, Q2H PRN **OR** morphINE PF 4 MG/ML injection 4 mg, 4 mg, Intravenous, Q2H PRN    ondansetron (Zofran) 4 MG/2ML injection 4 mg, 4 mg, Intravenous, Q6H PRN    prochlorperazine (Compazine) 10 MG/2ML injection 5 mg, 5 mg, Intravenous, Q8H PRN    polyethylene  glycol (PEG 3350) (Miralax) 17 g oral packet 17 g, 17 g, Oral, Daily PRN    sennosides (Senokot) tab 17.2 mg, 17.2 mg, Oral, Nightly PRN    bisacodyl (Dulcolax) 10 MG rectal suppository 10 mg, 10 mg, Rectal, Daily PRN    fleet enema (Fleet) rectal enema 133 mL, 1 enema, Rectal, Once PRN    acetaminophen (Tylenol Extra Strength) tab 1,000 mg, 1,000 mg, Oral, Once    lactated ringers infusion, , Intravenous, Continuous  [6]   Patient Active Problem List  Diagnosis    Hypothyroid    PVC (premature ventricular contraction)    Primary hypertension    Mild anxiety    HANNA (nonalcoholic steatohepatitis)    KHLOE (obstructive sleep apnea)    RLS (restless legs syndrome)    Intractable abdominal pain    Kidney stone    Anemia    Hyperglycemia    Gross hematuria    Flank pain

## 2025-05-14 NOTE — PLAN OF CARE
NURSING ADMISSION NOTE      Patient admitted via Cart  Oriented to room.  Safety precautions initiated.  Bed in low position.  Call light in reach.  Alert and Oriented x4. On RA. VSS. Tele-NS. Flank Severe Pain controlled by PRN medications, see MAR for actions. Denies N/T. Voiding freely, straining urine. NPO. Denies N/V. Call light within reach at this time.    Plan: Urology to reassess - possible [place]

## 2025-05-14 NOTE — PLAN OF CARE
Patient is Aox4, VSS on RA, pain is controlled at this time, will try valium per urology recommendations, voiding appropriately, PVR negative, IV abx as ordered, call light and belongings within reach.    Awake

## 2025-05-16 ENCOUNTER — HOSPITAL ENCOUNTER (EMERGENCY)
Facility: HOSPITAL | Age: 40
Discharge: HOME | End: 2025-05-16
Attending: EMERGENCY MEDICINE
Payer: COMMERCIAL

## 2025-05-16 VITALS
OXYGEN SATURATION: 100 % | WEIGHT: 230 LBS | SYSTOLIC BLOOD PRESSURE: 139 MMHG | RESPIRATION RATE: 22 BRPM | TEMPERATURE: 97 F | BODY MASS INDEX: 34.46 KG/M2 | HEART RATE: 79 BPM | DIASTOLIC BLOOD PRESSURE: 78 MMHG | HEIGHT: 68.5 IN

## 2025-05-16 DIAGNOSIS — R11.2 NAUSEA AND VOMITING IN ADULT: Primary | ICD-10-CM

## 2025-05-16 DIAGNOSIS — Z96.0 URETERAL STENT PRESENT: ICD-10-CM

## 2025-05-16 LAB
ALBUMIN SERPL-MCNC: 5.1 G/DL (ref 3.2–4.8)
ALBUMIN/GLOB SERPL: 1.7 {RATIO} (ref 1–2)
ALP LIVER SERPL-CCNC: 47 U/L (ref 37–98)
ALT SERPL-CCNC: 11 U/L (ref 10–49)
ANION GAP SERPL CALC-SCNC: 8 MMOL/L (ref 0–18)
AST SERPL-CCNC: 15 U/L (ref ?–34)
BASOPHILS # BLD AUTO: 0.04 X10(3) UL (ref 0–0.2)
BASOPHILS NFR BLD AUTO: 0.3 %
BILIRUB SERPL-MCNC: 0.5 MG/DL (ref 0.3–1.2)
BILIRUB UR QL STRIP.AUTO: NEGATIVE
BUN BLD-MCNC: 7 MG/DL (ref 9–23)
CALCIUM BLD-MCNC: 9.9 MG/DL (ref 8.7–10.6)
CHLORIDE SERPL-SCNC: 104 MMOL/L (ref 98–112)
CO2 SERPL-SCNC: 22 MMOL/L (ref 21–32)
CREAT BLD-MCNC: 0.68 MG/DL (ref 0.55–1.02)
EGFRCR SERPLBLD CKD-EPI 2021: 113 ML/MIN/1.73M2 (ref 60–?)
EOSINOPHIL # BLD AUTO: 0.03 X10(3) UL (ref 0–0.7)
EOSINOPHIL NFR BLD AUTO: 0.2 %
ERYTHROCYTE [DISTWIDTH] IN BLOOD BY AUTOMATED COUNT: 14.9 %
GLOBULIN PLAS-MCNC: 3 G/DL (ref 2–3.5)
GLUCOSE BLD-MCNC: 126 MG/DL (ref 70–99)
GLUCOSE UR STRIP.AUTO-MCNC: NORMAL MG/DL
HCT VFR BLD AUTO: 33.7 % (ref 35–48)
HGB BLD-MCNC: 10.7 G/DL (ref 12–16)
IMM GRANULOCYTES # BLD AUTO: 0.08 X10(3) UL (ref 0–1)
IMM GRANULOCYTES NFR BLD: 0.6 %
KETONES UR STRIP.AUTO-MCNC: 10 MG/DL
LEUKOCYTE ESTERASE UR QL STRIP.AUTO: 500
LYMPHOCYTES # BLD AUTO: 1.21 X10(3) UL (ref 1–4)
LYMPHOCYTES NFR BLD AUTO: 9.5 %
MCH RBC QN AUTO: 22.9 PG (ref 26–34)
MCHC RBC AUTO-ENTMCNC: 31.8 G/DL (ref 31–37)
MCV RBC AUTO: 72.2 FL (ref 80–100)
MONOCYTES # BLD AUTO: 0.5 X10(3) UL (ref 0.1–1)
MONOCYTES NFR BLD AUTO: 3.9 %
NEUTROPHILS # BLD AUTO: 10.88 X10 (3) UL (ref 1.5–7.7)
NEUTROPHILS # BLD AUTO: 10.88 X10(3) UL (ref 1.5–7.7)
NEUTROPHILS NFR BLD AUTO: 85.5 %
NITRITE UR QL STRIP.AUTO: NEGATIVE
OSMOLALITY SERPL CALC.SUM OF ELEC: 278 MOSM/KG (ref 275–295)
PH UR STRIP.AUTO: 7.5 [PH] (ref 5–8)
PLATELET # BLD AUTO: 488 10(3)UL (ref 150–450)
POTASSIUM SERPL-SCNC: 4 MMOL/L (ref 3.5–5.1)
PROT SERPL-MCNC: 8.1 G/DL (ref 5.7–8.2)
PROT UR STRIP.AUTO-MCNC: 100 MG/DL
RBC # BLD AUTO: 4.67 X10(6)UL (ref 3.8–5.3)
RBC #/AREA URNS AUTO: >10 /HPF
SODIUM SERPL-SCNC: 134 MMOL/L (ref 136–145)
SP GR UR STRIP.AUTO: 1.01 (ref 1–1.03)
UROBILINOGEN UR STRIP.AUTO-MCNC: NORMAL MG/DL
WBC # BLD AUTO: 12.7 X10(3) UL (ref 4–11)
WBC CLUMPS UR QL AUTO: PRESENT /HPF

## 2025-05-16 PROCEDURE — 99284 EMERGENCY DEPT VISIT MOD MDM: CPT

## 2025-05-16 PROCEDURE — 87086 URINE CULTURE/COLONY COUNT: CPT | Performed by: EMERGENCY MEDICINE

## 2025-05-16 PROCEDURE — 81001 URINALYSIS AUTO W/SCOPE: CPT | Performed by: EMERGENCY MEDICINE

## 2025-05-16 PROCEDURE — 96361 HYDRATE IV INFUSION ADD-ON: CPT

## 2025-05-16 PROCEDURE — 96375 TX/PRO/DX INJ NEW DRUG ADDON: CPT

## 2025-05-16 PROCEDURE — 96374 THER/PROPH/DIAG INJ IV PUSH: CPT

## 2025-05-16 PROCEDURE — 80053 COMPREHEN METABOLIC PANEL: CPT | Performed by: EMERGENCY MEDICINE

## 2025-05-16 PROCEDURE — 85025 COMPLETE CBC W/AUTO DIFF WBC: CPT | Performed by: EMERGENCY MEDICINE

## 2025-05-16 RX ORDER — DIPHENHYDRAMINE HYDROCHLORIDE 50 MG/ML
25 INJECTION, SOLUTION INTRAMUSCULAR; INTRAVENOUS ONCE
Status: COMPLETED | OUTPATIENT
Start: 2025-05-16 | End: 2025-05-16

## 2025-05-16 RX ORDER — ONDANSETRON 2 MG/ML
4 INJECTION INTRAMUSCULAR; INTRAVENOUS ONCE
Status: COMPLETED | OUTPATIENT
Start: 2025-05-16 | End: 2025-05-16

## 2025-05-16 RX ORDER — ONDANSETRON 4 MG/1
4 TABLET, ORALLY DISINTEGRATING ORAL EVERY 4 HOURS PRN
Qty: 10 TABLET | Refills: 0 | Status: SHIPPED | OUTPATIENT
Start: 2025-05-16 | End: 2025-05-23

## 2025-05-16 RX ORDER — ONDANSETRON 2 MG/ML
4 INJECTION INTRAMUSCULAR; INTRAVENOUS ONCE
Status: DISCONTINUED | OUTPATIENT
Start: 2025-05-16 | End: 2025-05-16

## 2025-05-16 RX ORDER — METOCLOPRAMIDE HYDROCHLORIDE 5 MG/ML
10 INJECTION INTRAMUSCULAR; INTRAVENOUS ONCE
Status: COMPLETED | OUTPATIENT
Start: 2025-05-16 | End: 2025-05-16

## 2025-05-16 RX ORDER — HYDROMORPHONE HYDROCHLORIDE 1 MG/ML
1 INJECTION, SOLUTION INTRAMUSCULAR; INTRAVENOUS; SUBCUTANEOUS EVERY 30 MIN PRN
Refills: 0 | Status: DISCONTINUED | OUTPATIENT
Start: 2025-05-16 | End: 2025-05-16

## 2025-05-16 RX ORDER — METOCLOPRAMIDE 10 MG/1
10 TABLET ORAL 3 TIMES DAILY PRN
Qty: 20 TABLET | Refills: 0 | Status: SHIPPED | OUTPATIENT
Start: 2025-05-16 | End: 2025-06-15

## 2025-05-16 NOTE — ED INITIAL ASSESSMENT (HPI)
Pt presents to ED with c/o of nause and vomiting since 2330 last noc. C/o epigastric /upper abdominal pain 4/10 on pain scale. Has a left  \"kidney stent\" and was discharged on Wednesday s/p severe pain in back.

## 2025-05-16 NOTE — ED QUICK NOTES
Rounding Completed    PT states she is currently pain and nausea free and feeling so much better    Elimination needs assessed.      Bed is locked and in lowest position. Call light within reach.

## 2025-05-16 NOTE — ED PROVIDER NOTES
Patient Seen in: [facility] Emergency Department      History     Chief Complaint   Patient presents with    Abdomen/Flank Pain    Nausea/Vomiting/Diarrhea     Stated Complaint: cant stop vomiting since 1130 last night, zofran taken  at 7am    Subjective:   HPI  History of Present Illness            39-year-old female with a history of right ureteral lithiasis and ureteral stent scheduled for laser lithotripsy and stent exchange in 1 week presents to the emergency department complaining of intractable vomiting since yesterday evening.  She has had issues with pain control due to the stent but currently that is not an issue.  She is taking Norco on a consistent basis at home and has been using Zofran now without consistent symptomatic improvement.  No fever.  Currently on Keflex that she was discharged from the hospital with a couple of days ago.      Objective:     Past Medical History:    Anxiety state    Bigeminy    Calculus of kidney    Depressive disorder    Difficult intubation    documented 7/14/2020 by Dr Villegas and 5/1/25 by Dr Ch    Disorder of thyroid    Dysmenorrhea    Elevated LFTs    Essential hypertension    GERD (gastroesophageal reflux disease)    HEADACHES    High cholesterol    History of miscarriage, currently pregnant ([facility])    Hx of motion sickness    HYPOTHYROIDISM    Irregular menses    Low-lying placenta ([facility])    Malpositioned IUD    Mental disorder    hx of depression after ectopic pregnancy    Migraines    HANNA (nonalcoholic steatohepatitis)    before 2020, no current problems    Painful lumpy left breast    Palpitations    PONV (postoperative nausea and vomiting)    Post partum depression    hx of after ectopic pregnancy, took meds for about 6 weeks    Pregnancy ([facility])    Pregnancy-induced hypertension ([facility])    Sleep apnea    no current treatment    Syncope, near    Unspecified hypothyroidism    Visual impairment    glasses              Past Surgical History:   Procedure Laterality  Date          x2    Cholecystectomy  2020    Gastric bypass,obese<100cm shana-en-y      Laparoscopic salpingostomy Right 2013    right-ectopic    Other surgical history  2005    thyroid removed    Removal gallbladder      Thyroidectomy  2005    nodular goiter    Total abdominal hysterectomy  2023    + ovaries, cervix removed                Social History     Socioeconomic History    Marital status:    Tobacco Use    Smoking status: Never    Smokeless tobacco: Never   Vaping Use    Vaping status: Never Used   Substance and Sexual Activity    Alcohol use: Yes     Comment: once a month    Drug use: No    Sexual activity: Yes     Partners: Male     Social Drivers of Health     Food Insecurity: No Food Insecurity (2025)    NCSS - Food Insecurity     Worried About Running Out of Food in the Last Year: No     Ran Out of Food in the Last Year: No   Transportation Needs: No Transportation Needs (2025)    NCSS - Transportation     Lack of Transportation: No   Housing Stability: Not At Risk (2025)    NCSS - Housing/Utilities     Has Housing: Yes     Worried About Losing Housing: No     Unable to Get Utilities: No                                Physical Exam     ED Triage Vitals [25 1149]   /86   Pulse 85   Resp 20   Temp 97.3 °F (36.3 °C)   Temp src Temporal   SpO2 100 %   O2 Device None (Room air)       Current Vitals:   Vital Signs  BP: 141/81  Pulse: 78  Resp: 24  Temp: 97.3 °F (36.3 °C)  Temp src: Temporal  MAP (mmHg): 98    Oxygen Therapy  SpO2: 100 %  O2 Device: None (Room air)          Physical Exam       General Appearance: This is a young adult female sitting on a gurney.  Vital signs were reviewed per nurses notes.  HEENT: Normocephalic/atraumatic.  Anicteric sclera.  Oral mucosa is moist.  Oropharynx is normal.  Neck: No adenopathy or thyromegaly.  Lungs are clear to auscultation.  Heart exam: Normal S1-S2 without extra sounds or murmurs.  Regular rate and  rhythm.  Abdomen is nontender.  Extremities are atraumatic.  Skin is dry without rashes or lesions.  Neuroexam: Awake, conversive and moving all 4 extremities well.      ED Course     Labs Reviewed   COMP METABOLIC PANEL (14) - Abnormal; Notable for the following components:       Result Value    Glucose 126 (*)     Sodium 134 (*)     BUN 7 (*)     Albumin 5.1 (*)     All other components within normal limits   CBC WITH DIFFERENTIAL WITH PLATELET - Abnormal; Notable for the following components:    WBC 12.7 (*)     HGB 10.7 (*)     HCT 33.7 (*)     .0 (*)     MCV 72.2 (*)     MCH 22.9 (*)     Neutrophil Absolute Prelim 10.88 (*)     Neutrophil Absolute 10.88 (*)     All other components within normal limits   URINALYSIS WITH CULTURE REFLEX - Abnormal; Notable for the following components:    Clarity Urine Ex.Turbid (*)     Ketones Urine 10 (*)     Blood Urine 3+ (*)     Protein Urine 100 (*)     Leukocyte Esterase Urine 500 (*)     WBC Urine 11-20 (*)     RBC Urine >10 (*)     Bacteria Urine Rare (*)     Squamous Epi. Cells Few (*)     WBC Clump Present (*)     All other components within normal limits   RAINBOW DRAW LAVENDER   RAINBOW DRAW LIGHT GREEN   RAINBOW DRAW BLUE   RAINBOW DRAW GOLD   URINE CULTURE, ROUTINE          Results            Intravenous access was obtained.  Laboratory studies were drawn.  IV fluids were administered.  The patient was given Reglan and Benadryl as well as intravenous Zosyn for nausea with symptomatic improvement.    Laboratory studies were reviewed.  The patient has a urine culture from 3 days ago which shows no growth.  Even though there are white cells present in the current urinalysis I recommended discontinuing antibiotics as this may be what is causing the vomiting that the patient is experiencing.  She is scheduled to start on Bactrim preprocedure on Monday.    Discharge instructions were rendered.                    MDM      #1.  Nausea and vomiting.  Patient has  prescriptions for both Reglan and Zofran to use as needed.  Antibiotics discontinued at this time.  New urine culture was sent.  Last urine culture from 3 days ago was negative.    2.  Ureteral stent present.        MDM    Disposition and Plan     Clinical Impression:  1. Nausea and vomiting in adult    2. Ureteral stent present         Disposition:  Discharge  5/16/2025  2:40 pm    Follow-up:  Walker Andrade MD  [address] [place] [address]  [phone]    Call            Medications Prescribed:  Current Discharge Medication List        START taking these medications    Details   ondansetron 4 MG Oral Tablet Dispersible Take 1 tablet (4 mg total) by mouth every 4 (four) hours as needed for Nausea.  Qty: 10 tablet, Refills: 0      metoclopramide 10 MG Oral Tab Take 1 tablet (10 mg total) by mouth 3 (three) times daily as needed.  Qty: 20 tablet, Refills: 0                   Supplementary Documentation:

## 2025-05-18 NOTE — DISCHARGE SUMMARY
[facility]     Iliana Ryan Patient Status:  Observation    1985 MRN [id number]   Location [facility] 3SW-A Attending No att. providers found   Hosp Day # 0 PCP PHYSICIAN NONSTAFF     Date of Admission: 2025  Date of Discharge:  2025     Discharge Disposition: Home or Self Care    Discharge Diagnosis:  Left flank pain  Left nephrolithiasis, stent related pain  Hypothyroidism  Hyperlipidemia  Migraines  Anxiety  Obesity    History of Present Illness:   Iliana Ryan is a 39 year old female with medical history of left nephrolithiasis , hypothyroidism, and hyperlipidemia who presents to the ER with complaints of left sided flank pain and suprapubic pressure.  She had a cystoscopy with stent placement earlier this month.  She was feeling well right after her procedure. She started to have left flank pain again on Friday and was seen in the ER.  She had a X-ray which showed that the stones had moved.  She started having pain again this morning.  She had associated hematuria as well.  She denies a fever, chills, headaches, dizziness or diarrhea.     Brief Synopsis: Patient is a 39 year old female admitted with pain related to her recently placed stent for her nephrolithiasis,  she has a CT which showed stent in proper position.  She was started on valium for spasms and her pain was improved.  She was discharged home in good condition.    Lace+ Score: 53  59-90 High Risk  29-58 Medium Risk  0-28   Low Risk       TCM Follow-Up Recommendation:  LACE 29-58: Moderate Risk of readmission after discharge from the hospital.      Procedures during hospitalization:   none    Consultants:  Urology    Discharge Medication List:     Discharge Medications        START taking these medications        Instructions Prescription details   cephALEXin 500 MG Caps  Commonly known as: Keflex      Take 1 capsule (500 mg total) by mouth 4 (four) times daily for 5 days.   Stop  taking on: May 19, 2025  Quantity: 20 capsule  Refills: 0     diazePAM 5 MG Tabs  Commonly known as: Valium      Take 1 tablet (5 mg total) by mouth every 6 (six) hours as needed (spasms).   Quantity: 30 tablet  Refills: 0            CHANGE how you take these medications        Instructions Prescription details   buPROPion  MG Tb24  Commonly known as: Wellbutrin XL  What changed:   how much to take  how to take this  when to take this  additional instructions      2 tabs po Qam   Quantity: 60 tablet  Refills: 3     topiramate 25 MG Tabs  Commonly known as: TopaMAX  What changed:   how much to take  how to take this  when to take this  additional instructions      1 tab po Qam before breakfast x 1 week, then increase to 2 tabs po Qam before breakfast.   Quantity: 60 tablet  Refills: 3            CONTINUE taking these medications        Instructions Prescription details   HYDROcodone-acetaminophen 5-325 MG Tabs  Commonly known as: Norco      Take 1-2 tablets by mouth every 4 (four) hours as needed for Pain.   Quantity: 20 tablet  Refills: 0     Levothyroxine Sodium 300 MCG Tabs      Take 1 tablet (300 mcg total) by mouth in the morning. Take 300mcg (1 tablet) by mouth daily. Take each dose with 1 tablet of 25mcg levothyroxine for a total daily dose of 325mcg levothyroxine.   Refills: 0     levothyroxine 25 MCG Tabs  Commonly known as: Synthroid      Take 1 tablet (25 mcg total) by mouth before breakfast. Take 25mcg (1 tablet) by mouth daily. Take each dose with 1 tablet of levothyroxine 300mcg for a total daily dose of 325mcg levothyroxine daily.   Refills: 0     oxybutynin 5 MG Tabs  Commonly known as: Ditropan      Take 1 tablet (5 mg total) by mouth 3 (three) times daily as needed (Bladder spasms). Stop 12 hours before [place] catheter removal in clinic (if applicable)   Quantity: 15 tablet  Refills: 0     phenazopyridine 100 MG Tabs  Commonly known as: Pyridium      Take 1 tablet (100 mg total) by mouth 3  (three) times daily as needed for Pain. This will turn your urine orange.   Quantity: 10 tablet  Refills: 0     Phentermine HCl 37.5 MG Tabs  Commonly known as: ADIPEX-P      Take 1 tablet (37.5 mg total) by mouth every morning before breakfast.   Quantity: 30 tablet  Refills: 3     Polyethylene Glycol 3350 17 g Pack  Commonly known as: MiraLax      Take 17 g by mouth daily as needed (Take to avoid constipation, especially if taking narcotic pain medications.).   Quantity: 20 packet  Refills: 1     rOPINIRole HCl 3 MG Tabs  Commonly known as: REQUIP      Take 2 tablets (6 mg total) by mouth nightly.   Refills: 0     rosuvastatin 5 MG Tabs  Commonly known as: Crestor      Take 1 tablet (5 mg total) by mouth nightly.   Quantity: 90 tablet  Refills: 0            STOP taking these medications      tamsulosin 0.4 MG Caps  Commonly known as: Flomax               ASK your doctor about these medications        Instructions Prescription details   ondansetron 4 mg tablet  Commonly known as: Zofran  Ask about: Should I take this medication?      Take 1 tablet (4 mg total) by mouth every 8 (eight) hours as needed for Nausea.   Stop taking on: May 16, 2025  Quantity: 10 tablet  Refills: 0               Where to Get Your Medications        These medications were sent to [facility]/pharmacy #[id number] [place], [place]  [facility] [place] [phone], [phone]  [address] [place] [address]      Phone: [phone]   cephALEXin 500 MG Caps  diazePAM 5 MG Tabs         [place]    Follow-up appointment:   Walker Andrade MD  [address] [place] [id number]  [phone]    Follow up  as scheduled for ureteroscopy.    Appointments for Next 30 Days 5/18/2025 - 6/17/2025        Date Arrival Time Visit Type Length Department Provider     5/28/2025  2:00 PM  PROCEDURE [0638] 30 min [facility], [facility] Walker Andrade MD    Patient Instructions:         Location  Instructions:     Masks are optional for all patients and visitors, unless otherwise indicated. Note: A $50 fee will be charged for missed appointments or same-day cancellations. Please provide 24 hours' notice if you need to cancel or reschedule your appointment.                      Vital signs:       Physical Exam:    General: No acute distress   Lungs: clear to auscultation  Cardiovascular: S1, S2  Abdomen: Soft      -----------------------------------------------------------------------------------------------  PATIENT DISCHARGE INSTRUCTIONS: See electronic chart    Renu Baig MD    Total time spent on discharge plannin minutes     The  [employer] Cures Act makes medical notes like these available to patients in the interest of transparency. Please be advised this is a medical document. Medical documents are intended to carry relevant information, facts as evident, and the clinical opinion of the practitioner. The medical note is intended as peer to peer communication and may appear blunt or direct. It is written in medical language and may contain abbreviations or verbiage that are unfamiliar.

## 2025-05-20 ENCOUNTER — ANESTHESIA (OUTPATIENT)
Dept: SURGERY | Facility: HOSPITAL | Age: 40
End: 2025-05-20
Payer: COMMERCIAL

## 2025-05-20 ENCOUNTER — HOSPITAL ENCOUNTER (OUTPATIENT)
Facility: HOSPITAL | Age: 40
Setting detail: HOSPITAL OUTPATIENT SURGERY
Discharge: HOME | End: 2025-05-20
Attending: UROLOGY | Admitting: UROLOGY
Payer: COMMERCIAL

## 2025-05-20 ENCOUNTER — APPOINTMENT (OUTPATIENT)
Dept: GENERAL RADIOLOGY | Facility: HOSPITAL | Age: 40
End: 2025-05-20
Attending: UROLOGY
Payer: COMMERCIAL

## 2025-05-20 ENCOUNTER — ANESTHESIA EVENT (OUTPATIENT)
Dept: SURGERY | Facility: HOSPITAL | Age: 40
End: 2025-05-20
Payer: COMMERCIAL

## 2025-05-20 VITALS
SYSTOLIC BLOOD PRESSURE: 168 MMHG | TEMPERATURE: 98 F | RESPIRATION RATE: 18 BRPM | OXYGEN SATURATION: 100 % | BODY MASS INDEX: 33.8 KG/M2 | DIASTOLIC BLOOD PRESSURE: 93 MMHG | HEART RATE: 87 BPM | HEIGHT: 68 IN | WEIGHT: 223 LBS

## 2025-05-20 DIAGNOSIS — N20.0 KIDNEY STONE: ICD-10-CM

## 2025-05-20 PROCEDURE — 74420 UROGRAPHY RTRGR +-KUB: CPT | Performed by: UROLOGY

## 2025-05-20 PROCEDURE — 52353 CYSTOURETERO W/LITHOTRIPSY: CPT | Performed by: UROLOGY

## 2025-05-20 PROCEDURE — 52356 CYSTO/URETERO W/LITHOTRIPSY: CPT | Performed by: UROLOGY

## 2025-05-20 DEVICE — URETERAL STENT
Type: IMPLANTABLE DEVICE | Site: URETER | Status: FUNCTIONAL
Brand: CONTOUR™

## 2025-05-20 RX ORDER — SCOPOLAMINE 1 MG/3D
PATCH, EXTENDED RELEASE TRANSDERMAL AS NEEDED
Status: DISCONTINUED | OUTPATIENT
Start: 2025-05-20 | End: 2025-05-20 | Stop reason: HOSPADM

## 2025-05-20 RX ORDER — PROCHLORPERAZINE EDISYLATE 5 MG/ML
5 INJECTION INTRAMUSCULAR; INTRAVENOUS EVERY 8 HOURS PRN
Status: DISCONTINUED | OUTPATIENT
Start: 2025-05-20 | End: 2025-05-20

## 2025-05-20 RX ORDER — LABETALOL HYDROCHLORIDE 5 MG/ML
INJECTION, SOLUTION INTRAVENOUS
Status: COMPLETED
Start: 2025-05-20 | End: 2025-05-20

## 2025-05-20 RX ORDER — OXYBUTYNIN CHLORIDE 5 MG/1
5 TABLET ORAL 3 TIMES DAILY PRN
Qty: 15 TABLET | Refills: 0 | Status: SHIPPED | OUTPATIENT
Start: 2025-05-20

## 2025-05-20 RX ORDER — HYDROMORPHONE HYDROCHLORIDE 1 MG/ML
0.2 INJECTION, SOLUTION INTRAMUSCULAR; INTRAVENOUS; SUBCUTANEOUS EVERY 5 MIN PRN
Status: DISCONTINUED | OUTPATIENT
Start: 2025-05-20 | End: 2025-05-20

## 2025-05-20 RX ORDER — SODIUM CHLORIDE, SODIUM LACTATE, POTASSIUM CHLORIDE, CALCIUM CHLORIDE 600; 310; 30; 20 MG/100ML; MG/100ML; MG/100ML; MG/100ML
INJECTION, SOLUTION INTRAVENOUS CONTINUOUS
Status: DISCONTINUED | OUTPATIENT
Start: 2025-05-20 | End: 2025-05-20

## 2025-05-20 RX ORDER — ACETAMINOPHEN 500 MG
1000 TABLET ORAL ONCE
Status: DISCONTINUED | OUTPATIENT
Start: 2025-05-20 | End: 2025-05-20 | Stop reason: HOSPADM

## 2025-05-20 RX ORDER — NALOXONE HYDROCHLORIDE 0.4 MG/ML
80 INJECTION, SOLUTION INTRAMUSCULAR; INTRAVENOUS; SUBCUTANEOUS AS NEEDED
Status: DISCONTINUED | OUTPATIENT
Start: 2025-05-20 | End: 2025-05-20

## 2025-05-20 RX ORDER — DEXAMETHASONE SODIUM PHOSPHATE 4 MG/ML
VIAL (ML) INJECTION AS NEEDED
Status: DISCONTINUED | OUTPATIENT
Start: 2025-05-20 | End: 2025-05-20 | Stop reason: SURG

## 2025-05-20 RX ORDER — HYDROCODONE BITARTRATE AND ACETAMINOPHEN 5; 325 MG/1; MG/1
2 TABLET ORAL ONCE AS NEEDED
Status: DISCONTINUED | OUTPATIENT
Start: 2025-05-20 | End: 2025-05-20

## 2025-05-20 RX ORDER — HYDROMORPHONE HYDROCHLORIDE 1 MG/ML
0.6 INJECTION, SOLUTION INTRAMUSCULAR; INTRAVENOUS; SUBCUTANEOUS EVERY 5 MIN PRN
Status: DISCONTINUED | OUTPATIENT
Start: 2025-05-20 | End: 2025-05-20

## 2025-05-20 RX ORDER — SCOPOLAMINE 1 MG/3D
1 PATCH, EXTENDED RELEASE TRANSDERMAL ONCE
Status: DISCONTINUED | OUTPATIENT
Start: 2025-05-20 | End: 2025-05-20 | Stop reason: HOSPADM

## 2025-05-20 RX ORDER — ONDANSETRON 2 MG/ML
INJECTION INTRAMUSCULAR; INTRAVENOUS AS NEEDED
Status: DISCONTINUED | OUTPATIENT
Start: 2025-05-20 | End: 2025-05-20 | Stop reason: SURG

## 2025-05-20 RX ORDER — LABETALOL HYDROCHLORIDE 5 MG/ML
5 INJECTION, SOLUTION INTRAVENOUS EVERY 5 MIN PRN
Status: DISCONTINUED | OUTPATIENT
Start: 2025-05-20 | End: 2025-05-20

## 2025-05-20 RX ORDER — CEPHALEXIN 500 MG/1
500 CAPSULE ORAL 3 TIMES DAILY
Qty: 6 CAPSULE | Refills: 0 | Status: SHIPPED | OUTPATIENT
Start: 2025-05-21 | End: 2025-05-23

## 2025-05-20 RX ORDER — ONDANSETRON 2 MG/ML
4 INJECTION INTRAMUSCULAR; INTRAVENOUS EVERY 6 HOURS PRN
Status: DISCONTINUED | OUTPATIENT
Start: 2025-05-20 | End: 2025-05-20

## 2025-05-20 RX ORDER — TAMSULOSIN HYDROCHLORIDE 0.4 MG/1
0.4 CAPSULE ORAL EVERY EVENING
Qty: 7 CAPSULE | Refills: 0 | Status: SHIPPED | OUTPATIENT
Start: 2025-05-20 | End: 2025-05-27

## 2025-05-20 RX ORDER — ACETAMINOPHEN 500 MG
1000 TABLET ORAL ONCE AS NEEDED
Status: DISCONTINUED | OUTPATIENT
Start: 2025-05-20 | End: 2025-05-20

## 2025-05-20 RX ORDER — IOPAMIDOL 612 MG/ML
INJECTION, SOLUTION INTRAVASCULAR AS NEEDED
Status: DISCONTINUED | OUTPATIENT
Start: 2025-05-20 | End: 2025-05-20 | Stop reason: HOSPADM

## 2025-05-20 RX ORDER — HYDROCODONE BITARTRATE AND ACETAMINOPHEN 5; 325 MG/1; MG/1
1 TABLET ORAL ONCE AS NEEDED
Status: DISCONTINUED | OUTPATIENT
Start: 2025-05-20 | End: 2025-05-20

## 2025-05-20 RX ORDER — ONDANSETRON 2 MG/ML
INJECTION INTRAMUSCULAR; INTRAVENOUS
Status: COMPLETED
Start: 2025-05-20 | End: 2025-05-20

## 2025-05-20 RX ORDER — MEPERIDINE HYDROCHLORIDE 25 MG/ML
12.5 INJECTION INTRAMUSCULAR; INTRAVENOUS; SUBCUTANEOUS AS NEEDED
Status: DISCONTINUED | OUTPATIENT
Start: 2025-05-20 | End: 2025-05-20

## 2025-05-20 RX ORDER — HYDROMORPHONE HYDROCHLORIDE 1 MG/ML
0.4 INJECTION, SOLUTION INTRAMUSCULAR; INTRAVENOUS; SUBCUTANEOUS EVERY 5 MIN PRN
Status: DISCONTINUED | OUTPATIENT
Start: 2025-05-20 | End: 2025-05-20

## 2025-05-20 RX ADMIN — DEXAMETHASONE SODIUM PHOSPHATE 8 MG: 4 MG/ML VIAL (ML) INJECTION at 14:10:00

## 2025-05-20 RX ADMIN — SODIUM CHLORIDE, SODIUM LACTATE, POTASSIUM CHLORIDE, CALCIUM CHLORIDE: 600; 310; 30; 20 INJECTION, SOLUTION INTRAVENOUS at 14:00:00

## 2025-05-20 RX ADMIN — ONDANSETRON 8 MG: 2 INJECTION INTRAMUSCULAR; INTRAVENOUS at 14:10:00

## 2025-05-20 NOTE — DISCHARGE INSTRUCTIONS
You had cystoscopy, ureteroscopy, and stent placement in the operating room today.    Instructions:    - No heavy lifting or strenuous activity for 1 day. You may resume regular activity tomorrow.       - Your follow-up appointment is listed below. Please contact us at [phone] if you need to change your appointment.     Your appointments       Date & Time Appointment Department ([place])    May 28, 2025 2:00 PM CDT Procedure with Walker Andrade MD [facility] ([place] [address])              [place] [id number]  [address]  [facility] [id number] [phone]             -  You have a stent (small plastic tube) inside your kidney and ureter to allow the swelling from surgery to resolve. This is only temporary and must be removed, so you should not forget about it. We will remove your stent via a brief cystoscopy in clinic when you return. If you have to miss this appointment for some reason please make sure you re-schedule it.        - With a ureteral stent in place you may have some discomfort on your side/flank. You can feel pain worsen when you urinate, so try to avoid holding urine and void every 2-3 hours. You also may notice increased blood in the urine as you increase your activity. If this happens increase your hydration and take it easy for a day. Warm baths/hot packs can help with the discomfort as well as your prescribed medications and Advil/Motrin (if you are able to take these).     - You may experience mild pain after the procedure for a few days.  If the pain becomes intolerable please contact our office or go to the nearest Emergency Room or Urgent Care. You should take over the counter ibuprofen (AKA motrin, advil) for mild pain (provided you do not have a medical condition such as stomach ulcers or kidney disease which prohibits you from taking these). You may alternate this with  tylenol as well. If pain is still not relieved by tylenol and/or ibuprofen, you may take narcotic pain medication if prescribed (typically oxycodone or tramadol). If you are taking narcotic pain medication this can make you constipated, so you should take over the counter stool softeners or miralax if prescribed.    - Warm pack or hot baths often help with discomfort after cystoscopy.    - If you take blood thinners (such as aspirin or plavix) please hold these medications until 3 days after surgery.     - Complete the antibiotics as given     - You may experience burning and frequency of urination over the next few days. This will improve after a few days if you stay well hydrated. If you were prescribed phenazopyridine (Pyridium) this may relieve urinary discomfort but you can only take this for 3 days. Pyridium will make your urine orange.     - You are likely to see some blood in your urine (pink or light red urine) that should clear up within a few days. Staying well hydrated should help this clear up. If you notice the urine stays dark red or there are multiple large blood clots despite good hydration, please call the urology clinic ([phone]).     - Try to abstain from alcohol, coffee, tea, artificial sweeteners, and spicy food for the next 48 hours as these can irritate the bladder.     - If you develop fevers / chills, difficulty urinating, or abdominal pain that does not improve with pain medications, please call the office.     - Drink 1.5 to 2 liters of fluid today (water is preferable). If you are on a fluid restriction due to other medical reasons then you need to adhere to your fluid restriction recommendations.      Walker Andrade MD  Staff Urologist  [facility]  Office: [phone]

## 2025-05-20 NOTE — H&P
UROLOGY PRE-OPERATIVE HISTORY & PHYSICAL      Iliana Ryan Patient Status:  Hospital Outpatient Surgery    1985 MRN [id number]   Location [facility] SURGERY Attending Walker Andrade MD   Hosp Day # 0 PCP PHYSICIAN NONSTAFF     Primary Care Provider: Nonstaff, Physician     Procedure      Cystoscopy, LEFT ureteroscopy, laser lithotripsy, left stent EXCHANGE:       History of Present Illness:     Iliana Ryan is a a(n) 39 year old female with hx of hypothyroidism, KHLOE, migraines, HTN, depression, who presented to the ED for worsening left sided abdominal pain after being diagnosed with ureteral stone on 25- underwent stent placement on 25- here now for ureteroscopy.     Patient presented as above with left flank pain. CT was done and showed a 6mm left proximal ureteral stone and additional bilateral stones. Of note; patient with history of gastric bypass and topimerate use (risks for stones). Urine was negative.   Also reports remote history of passed stone.   Given ongoing pain; was taken to OR on  for stent placement.   Findings: Normal urethra. No bladder lesions. Left RPG with mild hydronephrosis. Left ureteral stent (6x26) placed without issue. No significant cloudy/purulent urine noted.     She was discharged in stable condition. Unfortunately returned to ED with pain on - stent was in appropriate position; pain medications given.   Then admitted  for ongoing stent pain.   CT showed left sided stent with mild hydronephrosis; stone now in distal 1/3 ureter.   She was managed with pain medication; discharged in stable condition on  without intervention. PVR was low. Urine without significant infection.   She now presents for ureteroscopy.  The risks discussed included, but were not limited to, urinary tract infection, sepsis, bleeding, injury to urethra/bladder/ureter, urethra stricture, ureteral stricture, inability to treat stone. Patient agreeable to  proceed and informed consent obtained.       Ucx: Previous negative, contamination- currently on keflex       Blood thinner None   Stent size 6x26   5' 8\" (1.727 m)    History:     Past Medical History:    Anxiety state    Bigeminy    Calculus of kidney    Depressive disorder    Difficult intubation    documented 2020 by Dr Villegas and 25 by Dr Ch    Disorder of thyroid    Dysmenorrhea    Elevated LFTs    Essential hypertension    GERD (gastroesophageal reflux disease)    HEADACHES    High cholesterol    History of miscarriage, currently pregnant (HCC)    Hx of motion sickness    HYPOTHYROIDISM    Irregular menses    Low-lying placenta (HCC)    Malpositioned IUD    Mental disorder    hx of depression after ectopic pregnancy    Migraines    HANNA (nonalcoholic steatohepatitis)    before , no current problems    Painful lumpy left breast    Palpitations    PONV (postoperative nausea and vomiting)    Post partum depression    hx of after ectopic pregnancy, took meds for about 6 weeks    Pregnancy ([facility])    Pregnancy-induced hypertension (HCC)    Sleep apnea    no current treatment    Syncope, near    Unspecified hypothyroidism    Visual impairment    glasses       Past Surgical History:   Procedure Laterality Date          x2    Cholecystectomy  2020    Gastric bypass,obese<100cm shana-en-y      Laparoscopic salpingostomy Right 2013    right-ectopic    Other surgical history      thyroid removed    Removal gallbladder      Thyroidectomy      nodular goiter    Total abdominal hysterectomy  2023    + ovaries, cervix removed       Family History   Problem Relation Age of Onset    Diabetes Father     Hypertension Father     Diabetes Maternal Grandfather     Cancer Maternal Grandfather         prostate    Hypertension Maternal Grandfather     Heart Disorder Maternal Grandfather     Lung Disorder Maternal Grandfather     Other (Tumor in bowel) Maternal Aunt     Breast Cancer Other          MGGM       Social History     Socioeconomic History    Marital status:    Tobacco Use    Smoking status: Never    Smokeless tobacco: Never   Vaping Use    Vaping status: Never Used   Substance and Sexual Activity    Alcohol use: Yes     Comment: once a month    Drug use: No    Sexual activity: Yes     Partners: Male     Social Drivers of Health     Food Insecurity: No Food Insecurity (5/13/2025)    NCSS - Food Insecurity     Worried About Running Out of Food in the Last Year: No     Ran Out of Food in the Last Year: No   Transportation Needs: No Transportation Needs (5/13/2025)    NCSS - Transportation     Lack of Transportation: No   Housing Stability: Not At Risk (5/13/2025)    NCSS - Housing/Utilities     Has Housing: Yes     Worried About Losing Housing: No     Unable to Get Utilities: No       Medications:  Current Outpatient Medications   Medication Sig Dispense Refill    oxybutynin 5 MG Oral Tab Take 1 tablet (5 mg total) by mouth 3 (three) times daily as needed (Bladder spasms). Stop 12 hours before Livingston catheter removal in clinic (if applicable) 15 tablet 0    phenazopyridine (PYRIDIUM) 100 MG Oral Tab Take 1 tablet (100 mg total) by mouth 3 (three) times daily as needed for Pain. This will turn your urine orange. 10 tablet 0    rOPINIRole HCl 3 MG Oral Tab Take 2 tablets (6 mg total) by mouth nightly.      Polyethylene Glycol 3350 (MIRALAX) 17 g Oral Powd Pack Take 17 g by mouth daily as needed (Take to avoid constipation, especially if taking narcotic pain medications.). 20 packet 1    HYDROcodone-acetaminophen 5-325 MG Oral Tab Take 1-2 tablets by mouth every 4 (four) hours as needed for Pain. 20 tablet 0    topiramate 25 MG Oral Tab 1 tab po Qam before breakfast x 1 week, then increase to 2 tabs po Qam before breakfast. (Patient taking differently: Take 2 tablets (50 mg total) by mouth daily.) 60 tablet 3    Phentermine HCl 37.5 MG Oral Tab Take 1 tablet (37.5 mg total) by mouth every  morning before breakfast. (Patient not taking: Reported on 5/16/2025) 30 tablet 3    buPROPion  MG Oral Tablet 24 Hr 2 tabs po Qam (Patient taking differently: Take 2 tablets (300 mg total) by mouth daily.) 60 tablet 3    rosuvastatin 5 MG Oral Tab Take 1 tablet (5 mg total) by mouth nightly. 90 tablet 0    ondansetron 4 MG Oral Tablet Dispersible Take 1 tablet (4 mg total) by mouth every 4 (four) hours as needed for Nausea. 10 tablet 0    metoclopramide 10 MG Oral Tab Take 1 tablet (10 mg total) by mouth 3 (three) times daily as needed. 20 tablet 0    Levothyroxine Sodium 300 MCG Oral Tab Take 1 tablet (300 mcg total) by mouth in the morning. Take 300mcg (1 tablet) by mouth daily. Take each dose with 1 tablet of 25mcg levothyroxine for a total daily dose of 325mcg levothyroxine.      levothyroxine 25 MCG Oral Tab Take 1 tablet (25 mcg total) by mouth before breakfast. Take 25mcg (1 tablet) by mouth daily. Take each dose with 1 tablet of levothyroxine 300mcg for a total daily dose of 325mcg levothyroxine daily.      diazePAM 5 MG Oral Tab Take 1 tablet (5 mg total) by mouth every 6 (six) hours as needed (spasms). 30 tablet 0    cephALEXin 500 MG Oral Cap Take 1 capsule (500 mg total) by mouth 4 (four) times daily for 5 days. 20 capsule 0       Allergies:  No Known Allergies    Review of Systems:   A comprehensive 10-point review of systems was completed.  Pertinent positives and negatives are noted in the the HPI.    Physical Exam:   Vital Signs:  Height 5' 8\" (1.727 m), weight 230 lb (104.3 kg), last menstrual period 09/29/2023, not currently breastfeeding.     CONSTITUTIONAL: Well developed, well nourished, in no acute distress   RESPIRATORY: Normal respiratory effort  ABDOMEN: Soft, non-tender, non-distended      Laboratory Data:  Lab Results   Component Value Date    WBC 12.7 (H) 05/16/2025    HGB 10.7 (L) 05/16/2025    .0 (H) 05/16/2025     Lab Results   Component Value Date     (L)  05/16/2025    K 4.0 05/16/2025     05/16/2025    CO2 22.0 05/16/2025    BUN 7 (L) 05/16/2025     (H) 05/16/2025    GFRAA 68 09/26/2020    AST 15 05/16/2025    ALT 11 05/16/2025    TP 8.1 05/16/2025    ALB 5.1 (H) 05/16/2025    CA 9.9 05/16/2025    MG 2.0 05/02/2025       Urinalysis Results (last three years):  Recent Labs     02/26/23  0836 01/13/25  1452 04/30/25  0637 05/01/25  0559 05/09/25  1155 05/13/25  1416 05/16/25  1309   COLORUR Other* Yellow Yellow Light-Yellow Dark-Yellow Light-Yellow  --    CLARITY Slightly Cloudy* Clear Slightly Cloudy* Clear Clear Turbid* Ex.Turbid*   SPECGRAVITY 1.015 1.010 1.020 1.015 1.010 1.017 1.015   PHURINE 6.0 5.5 5.5 5.0 6.0 7.5 7.5   PROUR 30 mg/dL* Negative Negative Negative 50* 100* 100*   GLUUR Negative Negative Negative Normal Normal Normal Normal   KETUR Negative Negative Negative Negative Negative Negative 10*   BILUR Negative Negative Negative Negative Negative Negative Negative   BLOODURINE Large* Negative Large* 2+* 3+* 3+* 3+*   NITRITE Negative Negative Negative Negative 1+* Negative Negative   UROBILINOGEN 0.2 0.2 0.2 Normal Normal 2* Normal   LEUUR Trace* Negative Negative Negative Negative 75* 500*   WBCUR 6-10*  --  1-5 1-5 6-10* 21-50* 11-20*   RBCUR >10*  --  >10* >10* >10* >10* >10*   BACUR 1+*  --  1+* None Seen None Seen Rare* Rare*       Urine Culture Results (last three years):  Lab Results   Component Value Date    URINECUL No Growth at 18-24 hrs. 05/16/2025    URINECUL No Growth at 18-24 hrs. 05/13/2025    URINECUL No Growth at 18-24 hrs. 05/09/2025    URINECUL  05/06/2025     <10,000 cfu/ml Multiple species present- probable contamination.    URINECUL  02/26/2023     <10,000 cfu/ml Multiple species present- probable contamination.    URINECUL  09/26/2020     10-50,000 cfu/ml Multiple species present-probable contamination.    URINECUL >100,000 CFU/ML Gram positive tony 10/02/2018    URINECUL No Growth 1 Day 03/11/2017    URINECUL 40,000  CFU/ML Mixed gram positive tony 01/22/2017    URINECUL 40,000 CFU/ML Mixed gram positive tony 07/17/2016       PSA:  No results found for: \"PSA\", \"PERCENTPSA\", \"PSAS\", \"PSAULTRA\"     Imaging (last three days):  No results found.     Assessment:   Iliana Ryan is a 39 year old y/o female who presents for the above stated procedure.       Plan:     - OR for Cystoscopy, LEFT ureteroscopy, laser lithotripsy, left stent EXCHANGE:     - NPO since midnight   - Antibiotics ordered for OR   - Informed consent obtained - risks and benefits explained, and all questions answered  - Marked appropriately     I have personally reviewed all relevant medical records, labs, and imaging.       Walker Andrade MD  Staff Urologist  [facility]  Office: [phone]

## 2025-05-20 NOTE — ANESTHESIA PREPROCEDURE EVALUATION
PRE-OP EVALUATION    Patient Name: Iliana Ryan    Admit Diagnosis: Kidney stone [N20.0]    Pre-op Diagnosis: Kidney stone [N20.0]    Cystoscopy, LEFT ureteroscopy, laser lithotripsy, left stent EXCHANGE    Anesthesia Procedure: Cystoscopy, LEFT ureteroscopy, laser lithotripsy, left stent EXCHANGE (Left: Urethra)    Surgeons and Role:     * Walker Andrade MD - Primary    Pre-op vitals reviewed.  Temp: 98.1 °F (36.7 °C)  Pulse: 59  Resp: 18  BP: 149/108  SpO2: 100 %  Body mass index is 33.41 kg/m².    Current medications reviewed.  Hospital Medications:  Current Medications[1]    Outpatient Medications:   Prescriptions Prior to Admission[2]    Allergies: Patient has no known allergies.      Anesthesia Evaluation        Anesthetic Complications           GI/Hepatic/Renal      (+) GERD          (+) liver disease                 Cardiovascular      ECG reviewed.  Exercise tolerance: good     MET: >4      (+) hypertension   (+) hyperlipidemia                                  Endo/Other           (+) hypothyroidism                       Pulmonary                    (+) sleep apnea and noncompliant      Neuro/Psych    Negative neuro/psych ROS.    (+) anxiety                      HANNA.  S/p Gastric bypass 10/2022: LFTs have improved per patient.    10/2023 ECHO findings:  Conclusions:   Left ventricle: The cavity size was normal. Wall thickness was normal.   Systolic function was normal. The estimated ejection fraction was 55-60%. No   diagnostic evidence for regional wall motion abnormalities. Left ventricular   diastolic function parameters were normal.     Impressions:  This study is compared with previous dated 09/28/2020: No   significant change.           Past Surgical History[3]  Social Hx on file[4]  History   Drug Use No     Available pre-op labs reviewed.  Lab Results   Component Value Date    WBC 12.7 (H) 05/16/2025    RBC 4.67 05/16/2025    HGB 10.7 (L) 05/16/2025    HCT 33.7 (L) 05/16/2025    MCV  72.2 (L) 05/16/2025    MCH 22.9 (L) 05/16/2025    MCHC 31.8 05/16/2025    RDW 14.9 05/16/2025    .0 (H) 05/16/2025     Lab Results   Component Value Date     (L) 05/16/2025    K 4.0 05/16/2025     05/16/2025    CO2 22.0 05/16/2025    BUN 7 (L) 05/16/2025    CREATSERUM 0.68 05/16/2025     (H) 05/16/2025    CA 9.9 05/16/2025            Airway      Mallampati: III  Mouth opening: >3 FB  TM distance: 4 - 6 cm  Neck ROM: full Cardiovascular    Cardiovascular exam normal.         Dental    Dentition appears grossly intact         Pulmonary    Pulmonary exam normal.                 Other findings              ASA: 3   Plan: general  NPO status verified and patient meets guidelines.          Plan/risks discussed with: patient                Present on Admission:  **None**             [1]    [Transfer Hold] acetaminophen (Tylenol Extra Strength) tab 1,000 mg  1,000 mg Oral Once    [Transfer Hold] scopolamine (Transderm-Scop) 1 MG/3DAYS patch 1 patch  1 patch Transdermal Once    lactated ringers infusion   Intravenous Continuous    ceFAZolin (Ancef) 2g in 10mL IV syringe premix  2 g Intravenous Once   [2]   Medications Prior to Admission   Medication Sig Dispense Refill Last Dose/Taking    ondansetron 4 MG Oral Tablet Dispersible Take 1 tablet (4 mg total) by mouth every 4 (four) hours as needed for Nausea. 10 tablet 0 5/20/2025 Morning    metoclopramide 10 MG Oral Tab Take 1 tablet (10 mg total) by mouth 3 (three) times daily as needed. 20 tablet 0 5/19/2025    Levothyroxine Sodium 300 MCG Oral Tab Take 1 tablet (300 mcg total) by mouth in the morning. Take 300mcg (1 tablet) by mouth daily. Take each dose with 1 tablet of 25mcg levothyroxine for a total daily dose of 325mcg levothyroxine.   5/20/2025    levothyroxine 25 MCG Oral Tab Take 1 tablet (25 mcg total) by mouth before breakfast. Take 25mcg (1 tablet) by mouth daily. Take each dose with 1 tablet of levothyroxine 300mcg for a total daily dose  of 325mcg levothyroxine daily.   2025    diazePAM 5 MG Oral Tab Take 1 tablet (5 mg total) by mouth every 6 (six) hours as needed (spasms). 30 tablet 0 2025    oxybutynin 5 MG Oral Tab Take 1 tablet (5 mg total) by mouth 3 (three) times daily as needed (Bladder spasms). Stop 12 hours before Livingston catheter removal in clinic (if applicable) 15 tablet 0 2025    phenazopyridine (PYRIDIUM) 100 MG Oral Tab Take 1 tablet (100 mg total) by mouth 3 (three) times daily as needed for Pain. This will turn your urine orange. 10 tablet 0 2025    rOPINIRole HCl 3 MG Oral Tab Take 2 tablets (6 mg total) by mouth nightly.   2025    Polyethylene Glycol 3350 (MIRALAX) 17 g Oral Powd Pack Take 17 g by mouth daily as needed (Take to avoid constipation, especially if taking narcotic pain medications.). 20 packet 1 Taking As Needed    HYDROcodone-acetaminophen 5-325 MG Oral Tab Take 1-2 tablets by mouth every 4 (four) hours as needed for Pain. 20 tablet 0 Past Week    topiramate 25 MG Oral Tab 1 tab po Qam before breakfast x 1 week, then increase to 2 tabs po Qam before breakfast. (Patient taking differently: Take 2 tablets (50 mg total) by mouth daily.) 60 tablet 3 Taking    Phentermine HCl 37.5 MG Oral Tab Take 1 tablet (37.5 mg total) by mouth every morning before breakfast. (Patient not taking: Reported on 2025) 30 tablet 3 Taking    buPROPion  MG Oral Tablet 24 Hr 2 tabs po Qam (Patient taking differently: Take 2 tablets (300 mg total) by mouth daily.) 60 tablet 3 2025    rosuvastatin 5 MG Oral Tab Take 1 tablet (5 mg total) by mouth nightly. 90 tablet 0 2025    [] cephALEXin 500 MG Oral Cap Take 1 capsule (500 mg total) by mouth 4 (four) times daily for 5 days. 20 capsule 0     [] ondansetron (ZOFRAN) 4 mg tablet Take 1 tablet (4 mg total) by mouth every 8 (eight) hours as needed for Nausea. 10 tablet 0    [3]   Past Surgical History:  Procedure Laterality Date           x2    Cholecystectomy  07/14/2020    Gastric bypass,obese<100cm shana-en-y  2022    Laparoscopic salpingostomy Right 2013    right-ectopic    Other surgical history  2005    thyroid removed    Removal gallbladder      Thyroidectomy  2005    nodular goiter    Total abdominal hysterectomy  12/2023    + ovaries, cervix removed   [4]   Social History  Socioeconomic History    Marital status:    Tobacco Use    Smoking status: Never    Smokeless tobacco: Never   Vaping Use    Vaping status: Never Used   Substance and Sexual Activity    Alcohol use: Yes     Comment: once a month    Drug use: No    Sexual activity: Yes     Partners: Male

## 2025-05-20 NOTE — ANESTHESIA POSTPROCEDURE EVALUATION
[facility]    Iliana Ryan Patient Status:  Hospital Outpatient Surgery   Age/Gender 39 year old female MRN [id number]   Location [facility] SURGERY Attending Walker Andrade MD   Hosp Day # 0 PCP PHYSICIAN NONSTAFF       Anesthesia Post-op Note    Cystoscopy, LEFT ureteroscopy, laser lithotripsy, left stent EXCHANGE    Procedure Summary       Date: 05/20/25 Room / Location:  MAIN OR 07 /  MAIN OR    Anesthesia Start: 1400 Anesthesia Stop: 1517    Procedure: Cystoscopy, LEFT ureteroscopy, laser lithotripsy, left stent EXCHANGE (Left: Urethra) Diagnosis:       Kidney stone      (Kidney stone [N20.0])    Surgeons: Walker Andrade MD Anesthesiologist: Fco Rowell MD    Anesthesia Type: general ASA Status: 3            Anesthesia Type: general    Vitals Value Taken Time   /111 05/20/25 15:19   Temp 97.4 05/20/25 15:19   Pulse 88 05/20/25 15:19   Resp 18 05/20/25 15:19   SpO2 100 05/20/25 15:19           Patient Location: PACU    Anesthesia Type: general    Airway Patency: patent and extubated    Postop Pain Control: adequate    Mental Status: mildly sedated but able to meaningfully participate in the post-anesthesia evaluation    Nausea/Vomiting: none    Cardiopulmonary/Hydration status: stable euvolemic    Complications: no apparent anesthesia related complications    Postop vital signs: stable    Dental Exam: Unchanged from Preop    Patient to be discharged from PACU when criteria met.

## 2025-05-20 NOTE — ANESTHESIA PROCEDURE NOTES
Airway  Date/Time: 5/20/2025 2:08 PM  Reason: elective      General Information and Staff   Patient location during procedure: OR  Anesthesiologist: Fco Rowell MD  Performed: anesthesiologist   Performed by: Fco Rowell MD  Authorized by: Fco Rowell MD        Indications and Patient Condition  Indications for airway management: anesthesia  Sedation level: deep      Preoxygenated: yesPatient position: sniffing    Mask difficulty assessment: 0 - not attempted    Final Airway Details    Final airway type: supraglottic airway      Successful airway: classic  Size: 3     Number of attempts at approach: 1  Ventilation between attempts: none  Number of other approaches attempted: 0    Additional Comments  PreO2.  IV induction as noted.  Eyes taped.  Initial LMA #4 placement unsuccessful; atraumatic LMA #3 placement, +ETCO2,+BBS.

## 2025-05-20 NOTE — OPERATIVE REPORT
Urology Operative Note    Attending Surgeon: Walker Andrade MD    Assistant Surgeon: None    Patient Name: Iliana Ryan    Date of Surgery: 5/20/2025    Preoperative Diagnosis: Left nephrolithiasis    Postoperative Diagnosis: Same    Procedure Performed:   Cystoscopy, left ureteroscopy, laser lithotripsy, basket stone extraction (URETER),  Left ureteroscopy, laser lithotripsy, basket stone extraction (KIDNEY)  Left retrograde pyelogram, ureteral stent exchange    Indication:  Iliana Ryan is a a(n) 39 year old female with hx of hypothyroidism, KHLOE, migraines, HTN, depression, who presented to the ED for worsening left sided abdominal pain after being diagnosed with ureteral stone on 5/1/25- underwent stent placement on 5/1/25- here now for ureteroscopy.   The patient was counseled on options, risks, and benefits and elected to undergo the above procedure. We discussed risks including, but not limited to, bleeding, infection, damage to surrounding structures, need for repeat procedure(s). The patient understood these risks and wished to proceed with surgery.  See preoperative note for full details of presentation and counseling.     Findings:  Normal urethra. Stent not encrusted. Left ureteroscopy with ~6mm stone in mid/distal ureter; laser fragmented/basket extracted.   Kidney with additional scattered stones; largest ~8mm in size. Additional nephrocalcinosis noted. All stones laser fragmented/dusted and basket extracted.   6x26 stent placed without issue.       Procedure:  The patient was taken to the operating room and a timeout was performed confirming the correct patient and procedure. The patient was prepped and draped in lithotomy position after undergoing general anesthesia. Pre-operative prophylactic antibiotics were given in the form of Ancef.    The cystoscope was inserted per urethra and the bladder was inspected and drained. The left ureteral stent was grasped and pulled to the  urethral meatus with a cystoscopic grasper. The stent was cannulated with a Sensor wire, and the wire was passed into the renal pelvis which I confirmed using fluoroscopy. The stent was fully removed.     Then a dual lumen catheter was passed over the wire and into the distal ureter. A retrograde pyelogram was obtained demonstrating no hydronephrosis; appropriate wire placement. The catheter was then removed.     I then inserted a semirigid ureteroscope alongside the wire and into the ureter. Ureteroscopy revealed a ~6mm stone in the distal ureter. The stone was fragmented using a laser, and then the fragments were extracted using a zero tip basket. The stones were dropped in the bladder and later removed through the cystoscope. I then performed semirigid ureteroscopy to the level of the UPJ and saw no further stones or lesions; a retrograde pyelogram from this level showed no kidney filling defects. I then placed a second wire through the scope and into the kidney under visual guidance and then proceeded to remove the semirigid ureteroscope leaving 2 wires in place.     Over our working wire we gently introduced a 11/13F x 28cm ureteral access sheath under fluoroscopic visualization to the level of the proximal ureter. This passed easily. Once that was done, we then removed the inner sheath and wire.  The flexible ureteroscope was advanced into the sheath and up into the left renal pelvis.  A systematic inspection of the kidney revealed several stones throughout the kidney- largest ~8mm in size. Additional nephrocalcinosis noted.   The stone was fragmented using a laser, and then the fragments were extracted using a zero tip basket. Any smaller remaining stone fragments were lasered to dust, and thoroughly irrigated. All renal calyces were surveyed once more, and no large fragments were seen. A retrograde pyelogram was performed through the scope and showed no extravasation. The ureter was inspected while slowly  removing the scope and access sheath, and it appeared healthy without stone fragments seen.    We then placed a 6x26  double-J ureteral stent over our safety wire under direct cystoscopic and fluoroscopic guidance. The proximal and distal curls formed appropriately. Stent left without strings. The bladder was then drained and the cystoscope was removed. The procedure was then terminated.    The patient was awoken from anesthesia and transferred to PACU in stable condition. The patient tolerated the procedure well. All instrument/supply counts were correct at the end of the case.    Specimens:   Stone fragments for chemical analysis    Estimated Blood Loss:  1 mL    Tubes/Drains:  6-[place] x 26 cm JJ left ureteral stent    Complications:   None immediate    Condition from OR:  Stable    Plan:   Abx x2 days   Return for stent removal as scheduled    Future Appointments   Date Time Provider Department Center   5/28/2025  2:00 PM Walker Andrade MD [id number]  [facility]           Walker Andrade MD  Staff Urologist  [facility]  Office: [phone]

## 2025-05-21 ENCOUNTER — TELEPHONE (OUTPATIENT)
Dept: SURGERY | Facility: CLINIC | Age: 40
End: 2025-05-21

## 2025-05-21 NOTE — TELEPHONE ENCOUNTER
Rn phoned Patient to discuss below.  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 Requesting a Return to Work letter for employer.  Patient denies pain, fever.  Has small blood in urine, Patient advise that blood can occur to hydrate with 48-68 oz of water daily.   Patient instructed to avoid heavy lifting or strenuous activity, void Q 2-3  hours and take OTC Tylenol if experience mild pain.  Patient given ER precautions for fever/chills, pain or increased bleeding.  Patient agreeable with plan.  MCM sent per Patient request.  This Encounter is now closed.

## 2025-05-21 NOTE — TELEPHONE ENCOUNTER
Pt called.  Pt requesting a note for work pt had surgery yesterday 5-20-25 and is able to return to work.  Was not told when pt can return.   Put on mychart.  Please call

## 2025-05-22 ENCOUNTER — HOSPITAL ENCOUNTER (EMERGENCY)
Facility: HOSPITAL | Age: 40
Discharge: HOME | End: 2025-05-22
Attending: STUDENT IN AN ORGANIZED HEALTH CARE EDUCATION/TRAINING PROGRAM
Payer: COMMERCIAL

## 2025-05-22 ENCOUNTER — APPOINTMENT (OUTPATIENT)
Dept: CT IMAGING | Facility: HOSPITAL | Age: 40
End: 2025-05-22
Attending: STUDENT IN AN ORGANIZED HEALTH CARE EDUCATION/TRAINING PROGRAM
Payer: COMMERCIAL

## 2025-05-22 VITALS
HEART RATE: 74 BPM | OXYGEN SATURATION: 99 % | HEIGHT: 68 IN | BODY MASS INDEX: 34.86 KG/M2 | DIASTOLIC BLOOD PRESSURE: 80 MMHG | WEIGHT: 230 LBS | SYSTOLIC BLOOD PRESSURE: 140 MMHG | TEMPERATURE: 98 F | RESPIRATION RATE: 20 BRPM

## 2025-05-22 DIAGNOSIS — R11.2 NAUSEA AND VOMITING IN ADULT: Primary | ICD-10-CM

## 2025-05-22 LAB
ALBUMIN SERPL-MCNC: 5.1 G/DL (ref 3.2–4.8)
ALBUMIN/GLOB SERPL: 1.6 {RATIO} (ref 1–2)
ALP LIVER SERPL-CCNC: 47 U/L (ref 37–98)
ALT SERPL-CCNC: 7 U/L (ref 10–49)
ANION GAP SERPL CALC-SCNC: 7 MMOL/L (ref 0–18)
AST SERPL-CCNC: 11 U/L (ref ?–34)
BASOPHILS # BLD AUTO: 0.04 X10(3) UL (ref 0–0.2)
BASOPHILS NFR BLD AUTO: 0.4 %
BILIRUB SERPL-MCNC: 0.5 MG/DL (ref 0.3–1.2)
BUN BLD-MCNC: <5 MG/DL (ref 9–23)
CALCIUM BLD-MCNC: 9.7 MG/DL (ref 8.7–10.6)
CHLORIDE SERPL-SCNC: 102 MMOL/L (ref 98–112)
CO2 SERPL-SCNC: 25 MMOL/L (ref 21–32)
CREAT BLD-MCNC: 0.79 MG/DL (ref 0.55–1.02)
EGFRCR SERPLBLD CKD-EPI 2021: 97 ML/MIN/1.73M2 (ref 60–?)
EOSINOPHIL # BLD AUTO: 0.13 X10(3) UL (ref 0–0.7)
EOSINOPHIL NFR BLD AUTO: 1.2 %
ERYTHROCYTE [DISTWIDTH] IN BLOOD BY AUTOMATED COUNT: 15 %
GLOBULIN PLAS-MCNC: 3.1 G/DL (ref 2–3.5)
GLUCOSE BLD-MCNC: 137 MG/DL (ref 70–99)
HCT VFR BLD AUTO: 37.1 % (ref 35–48)
HGB BLD-MCNC: 11.3 G/DL (ref 12–16)
IMM GRANULOCYTES # BLD AUTO: 0.11 X10(3) UL (ref 0–1)
IMM GRANULOCYTES NFR BLD: 1 %
LIPASE SERPL-CCNC: 38 U/L (ref 12–53)
LYMPHOCYTES # BLD AUTO: 1.63 X10(3) UL (ref 1–4)
LYMPHOCYTES NFR BLD AUTO: 15.4 %
MCH RBC QN AUTO: 22.3 PG (ref 26–34)
MCHC RBC AUTO-ENTMCNC: 30.5 G/DL (ref 31–37)
MCV RBC AUTO: 73.2 FL (ref 80–100)
MONOCYTES # BLD AUTO: 0.76 X10(3) UL (ref 0.1–1)
MONOCYTES NFR BLD AUTO: 7.2 %
NEUTROPHILS # BLD AUTO: 7.93 X10 (3) UL (ref 1.5–7.7)
NEUTROPHILS # BLD AUTO: 7.93 X10(3) UL (ref 1.5–7.7)
NEUTROPHILS NFR BLD AUTO: 74.8 %
PLATELET # BLD AUTO: 455 10(3)UL (ref 150–450)
POTASSIUM SERPL-SCNC: 4.1 MMOL/L (ref 3.5–5.1)
PROT SERPL-MCNC: 8.2 G/DL (ref 5.7–8.2)
RBC # BLD AUTO: 5.07 X10(6)UL (ref 3.8–5.3)
SODIUM SERPL-SCNC: 134 MMOL/L (ref 136–145)
WBC # BLD AUTO: 10.6 X10(3) UL (ref 4–11)

## 2025-05-22 PROCEDURE — 93010 ELECTROCARDIOGRAM REPORT: CPT

## 2025-05-22 PROCEDURE — 99284 EMERGENCY DEPT VISIT MOD MDM: CPT

## 2025-05-22 PROCEDURE — 99285 EMERGENCY DEPT VISIT HI MDM: CPT

## 2025-05-22 PROCEDURE — 93005 ELECTROCARDIOGRAM TRACING: CPT

## 2025-05-22 PROCEDURE — 80053 COMPREHEN METABOLIC PANEL: CPT | Performed by: STUDENT IN AN ORGANIZED HEALTH CARE EDUCATION/TRAINING PROGRAM

## 2025-05-22 PROCEDURE — 96361 HYDRATE IV INFUSION ADD-ON: CPT

## 2025-05-22 PROCEDURE — 85025 COMPLETE CBC W/AUTO DIFF WBC: CPT | Performed by: STUDENT IN AN ORGANIZED HEALTH CARE EDUCATION/TRAINING PROGRAM

## 2025-05-22 PROCEDURE — 74177 CT ABD & PELVIS W/CONTRAST: CPT | Performed by: STUDENT IN AN ORGANIZED HEALTH CARE EDUCATION/TRAINING PROGRAM

## 2025-05-22 PROCEDURE — 83690 ASSAY OF LIPASE: CPT | Performed by: STUDENT IN AN ORGANIZED HEALTH CARE EDUCATION/TRAINING PROGRAM

## 2025-05-22 PROCEDURE — 96375 TX/PRO/DX INJ NEW DRUG ADDON: CPT

## 2025-05-22 PROCEDURE — 96374 THER/PROPH/DIAG INJ IV PUSH: CPT

## 2025-05-22 RX ORDER — METOCLOPRAMIDE HYDROCHLORIDE 5 MG/ML
10 INJECTION INTRAMUSCULAR; INTRAVENOUS ONCE
Status: COMPLETED | OUTPATIENT
Start: 2025-05-22 | End: 2025-05-22

## 2025-05-22 RX ORDER — HALOPERIDOL 5 MG/ML
5 INJECTION INTRAMUSCULAR ONCE
Status: COMPLETED | OUTPATIENT
Start: 2025-05-22 | End: 2025-05-22

## 2025-05-22 RX ORDER — DIPHENHYDRAMINE HYDROCHLORIDE 50 MG/ML
25 INJECTION, SOLUTION INTRAMUSCULAR; INTRAVENOUS ONCE
Status: COMPLETED | OUTPATIENT
Start: 2025-05-22 | End: 2025-05-22

## 2025-05-22 NOTE — ED PROVIDER NOTES
Patient Seen in: [facility] Emergency Department        History  Chief Complaint   Patient presents with    Nausea/vomiting     Stated Complaint: pt states nausea, vomiting and abd pain    Subjective:   HPI    The patient is a 39-year-old female history of high for thyroidism, migraines hypertension depression along with history of cholecystectomy who is status post left ureteral stent placement followed by lithotripsy who is presenting with reports of intractable nausea and vomiting.  She states she has been feeling nauseous since Friday.  She had her stent exchanged and underwent a laser lithotripsy on May 20th.  She denies any fevers or chills or dysuria.  She does not complain of any overt abdominal pain just feels very nauseous.    Per chart review patient did undergo lithotripsy and stent exchange May 20 with Dr. Andrade.  She was also admitted from May 13 to the 14th because of stent related pain.  It appears at at that time patient also had nausea vomiting.  She underwent CT scan which showed no abnormal findings.  Objective:     Past Medical History:    Anxiety state    Bigeminy    Calculus of kidney    Depressive disorder    Difficult intubation    documented 7/14/2020 by Dr Villegas and 5/1/25 by Dr Ch    Disorder of thyroid    Dysmenorrhea    Elevated LFTs    Essential hypertension    GERD (gastroesophageal reflux disease)    HEADACHES    High cholesterol    History of miscarriage, currently pregnant (HCC)    Hx of motion sickness    HYPOTHYROIDISM    Irregular menses    Low-lying placenta (HCC)    Malpositioned IUD    Mental disorder    hx of depression after ectopic pregnancy    Migraines    HANNA (nonalcoholic steatohepatitis)    before 2020, no current problems    Painful lumpy left breast    Palpitations    PONV (postoperative nausea and vomiting)    Post partum depression    hx of after ectopic pregnancy, took meds for about 6 weeks    Pregnancy (HCC)    Pregnancy-induced hypertension (HCC)     Sleep apnea    no current treatment    Syncope, near    Unspecified hypothyroidism    Visual impairment    glasses              Past Surgical History:   Procedure Laterality Date          x2    Cholecystectomy  2020    Gastric bypass,obese<100cm shana-en-y      Laparoscopic salpingostomy Right 2013    right-ectopic    Other surgical history  2005    thyroid removed    Removal gallbladder      Thyroidectomy  2005    nodular goiter    Total abdominal hysterectomy  2023    + ovaries, cervix removed                Social History     Socioeconomic History    Marital status:    Tobacco Use    Smoking status: Never    Smokeless tobacco: Never   Vaping Use    Vaping status: Never Used   Substance and Sexual Activity    Alcohol use: Yes     Comment: once a month    Drug use: No    Sexual activity: Yes     Partners: Male     Social Drivers of Health     Food Insecurity: No Food Insecurity (2025)    NCSS - Food Insecurity     Worried About Running Out of Food in the Last Year: No     Ran Out of Food in the Last Year: No   Transportation Needs: No Transportation Needs (2025)    NCSS - Transportation     Lack of Transportation: No   Housing Stability: Not At Risk (2025)    NCSS - Housing/Utilities     Has Housing: Yes     Worried About Losing Housing: No     Unable to Get Utilities: No                                Physical Exam    ED Triage Vitals   BP 25 0205 140/80   Pulse 25 0205 118   Resp 25 0205 20   Temp 25 0155 98 °F (36.7 °C)   Temp src 25 0155 Oral   SpO2 25 0205 99 %   O2 Device 25 0205 None (Room air)       Current Vitals:   Vital Signs  BP: 140/80  Pulse: 74  Resp: 20  Temp: 98 °F (36.7 °C)  Temp src: Oral    Oxygen Therapy  SpO2: 99 %  O2 Device: None (Room air)            Physical Exam  Vitals and nursing note reviewed.   Constitutional:       General: She is not in acute distress.     Appearance: Normal appearance.   HENT:       Head: Normocephalic.      Nose: Nose normal.      Mouth/Throat:      Mouth: Mucous membranes are moist.   Eyes:      Extraocular Movements: Extraocular movements intact.      Pupils: Pupils are equal, round, and reactive to light.   Cardiovascular:      Rate and Rhythm: Normal rate and regular rhythm.      Pulses: Normal pulses.      Heart sounds: Normal heart sounds.   Pulmonary:      Effort: Pulmonary effort is normal.      Breath sounds: Normal breath sounds.   Abdominal:      General: Abdomen is flat. Bowel sounds are normal. There is no distension.      Palpations: Abdomen is soft.      Tenderness: There is abdominal tenderness. There is no right CVA tenderness, left CVA tenderness, guarding or rebound.      Hernia: No hernia is present.   Musculoskeletal:         General: No swelling or tenderness. Normal range of motion.      Cervical back: Normal range of motion.   Skin:     General: Skin is warm and dry.   Neurological:      Mental Status: She is alert and oriented to person, place, and time. Mental status is at baseline.   Psychiatric:         Mood and Affect: Mood normal.                 ED Course  Labs Reviewed   CBC WITH DIFFERENTIAL WITH PLATELET - Abnormal; Notable for the following components:       Result Value    HGB 11.3 (*)     .0 (*)     MCV 73.2 (*)     MCH 22.3 (*)     MCHC 30.5 (*)     Neutrophil Absolute Prelim 7.93 (*)     Neutrophil Absolute 7.93 (*)     All other components within normal limits   COMP METABOLIC PANEL (14) - Abnormal; Notable for the following components:    Glucose 137 (*)     Sodium 134 (*)     BUN <5 (*)     ALT 7 (*)     Albumin 5.1 (*)     All other components within normal limits   LIPASE - Normal   RAINBOW DRAW LAVENDER   RAINBOW DRAW LIGHT GREEN   RAINBOW DRAW BLUE       EKG    Rate, intervals and axes as noted on EKG Report.  Rate: 76  Rhythm: Sinus Rhythm  Reading: when compared to prior, PVCs no longer present             CT ABDOMEN AND PELVIS WITH  CONTRAST    Comparison: None      IMPRESSION:    The visualized portions of the appendix are unremarkable.    Status post cholecystectomy.  No significant biliary dilatation or acute pancreatic abnormality.    Multiple nonobstructing right renal calculi.  No evidence of right-sided hydronephrosis or ureteral calculus.    Left-sided ureteral stent is present.  Nonobstructing left renal calculi.  No hydronephrosis.  Question small gas bubble within the left upper pole renal collecting system.  This may be related to instrumentation of the bladder with reflux via the stent.  No obstructing calculus on the left.    Status post hysterectomy.    Status post gastric bypass.  No definite CT evidence for complication.    No ascites or bowel obstruction.    No mass or lymphadenopathy.    Report finalized at 05:01 AM ET      Misael Corona MD  This report has been electronically signed and verified by the Radiologist whose name is printed above.          MDM       Medical Decision Making    The differential includes the following  Bowel obstruction however unlikely, cyclical vomiting, choledocholithiasis, dehydration    Pertinent comorbidities include  As detailed above    Pertinent social history includes  As detailed above    Labs  Sodium is 134 which is stable from May 16, BUN less than 5 was 7 on May 16, AST 11 ALT 7  White blood cell count normal hemoglobin 11.3 from 10.7    Imaging studies  I reviewed the images and my independent interpreation after review is no bowel obstruction. Additionaly, I reviewd the radiology report that states the following as detailed above     External data reviewed  Urology notes/ op note as detailed above     Discussion of management with external providers      ER course  Arrival here patient is afebrile hemodynamically stable.  She has diffuse tenderness to palpation but no focal areas.  Her LFTs do not show elevations concerning for choledocholithiasis.  BUN is been trending downward  which signifies low oral intake.  Patient's been given IV fluids.  She was given Reglan and Benadryl but still noted that she felt nauseous.  EKG was performed which showed a QTc less than 500 therefore she was given IV Haldol which we discussed and patient was willing to try.  A CT scan was performed with ultimately does not show any signs of biliary ductal dilatation or pancreatic abnormality.  It shows appropriate placement of left ureteral stent as well as patient's history of a gastric bypass.  There is no signs of evidence of complication associated with this finding.    I reassessed the patient and she is sleeping comfortably.  She is easily aroused and notes that she feels better.  She and her  are willing to go home at this time.  I recommended clears and slow advancement of her diet and follow-up with PCP.  Should she continue to be unable to tolerate p.o. then she needs to be reevaluated in the ER.    Clinical Impression:  1. Nausea and vomiting in adult         Disposition:  Discharge  5/22/2025  4:21 am    Follow-up:  Nonstaff, Physician    Follow up  return to the ER for worse symptoms          Medications Prescribed:  Discharge Medication List as of 5/22/2025  4:25 AM                Supplementary Documentation:

## 2025-05-23 LAB
ATRIAL RATE: 76 BPM
P AXIS: 44 DEGREES
P-R INTERVAL: 166 MS
Q-T INTERVAL: 404 MS
QRS DURATION: 104 MS
QTC CALCULATION (BEZET): 454 MS
R AXIS: -6 DEGREES
T AXIS: 36 DEGREES
VENTRICULAR RATE: 76 BPM

## 2025-05-28 ENCOUNTER — PROCEDURE (OUTPATIENT)
Dept: SURGERY | Facility: CLINIC | Age: 40
End: 2025-05-28
Payer: COMMERCIAL

## 2025-05-28 ENCOUNTER — PATIENT MESSAGE (OUTPATIENT)
Dept: FAMILY MEDICINE CLINIC | Facility: CLINIC | Age: 40
End: 2025-05-28

## 2025-05-28 DIAGNOSIS — N20.0 KIDNEY STONE: Primary | ICD-10-CM

## 2025-05-28 LAB
BILIRUBIN: NEGATIVE
GLUCOSE (URINE DIPSTICK): NEGATIVE MG/DL
KETONES (URINE DIPSTICK): NEGATIVE MG/DL
MULTISTIX LOT#: ABNORMAL NUMERIC
NITRITE, URINE: NEGATIVE
PH, URINE: 7 (ref 4.5–8)
PROTEIN (URINE DIPSTICK): 30 MG/DL
SPECIFIC GRAVITY: 1.01 (ref 1–1.03)
URINE-COLOR: YELLOW
UROBILINOGEN,SEMI-QN: 0.2 MG/DL (ref 0–1.9)

## 2025-05-28 PROCEDURE — 52310 CYSTOSCOPY AND TREATMENT: CPT | Performed by: UROLOGY

## 2025-05-28 PROCEDURE — 81003 URINALYSIS AUTO W/O SCOPE: CPT | Performed by: UROLOGY

## 2025-05-28 RX ORDER — SULFAMETHOXAZOLE AND TRIMETHOPRIM 800; 160 MG/1; MG/1
1 TABLET ORAL ONCE
Status: COMPLETED | OUTPATIENT
Start: 2025-05-28 | End: 2025-05-28

## 2025-05-28 RX ADMIN — SULFAMETHOXAZOLE AND TRIMETHOPRIM 1 TABLET: 800; 160 TABLET ORAL at 14:10:00

## 2025-05-28 NOTE — PROGRESS NOTES
Clinic Procedure Note    INDICATIONS:        Iliana Ryan is a a(n) 39 year old female with hx of hypothyroidism, KHLOE, migraines, HTN, depression, who presented to the ED for worsening left sided abdominal pain after being diagnosed with ureteral stone on 5/1/25- underwent stent placement on 5/1/25- here now for ureteroscopy.      Patient presented as above with left flank pain. CT was done and showed a 6mm left proximal ureteral stone and additional bilateral stones. Of note; patient with history of gastric bypass and topimerate use (risks for stones). Urine was negative.   Also reports remote history of passed stone.   Given ongoing pain; was taken to OR on 5/1 for stent placement.   Findings: Normal urethra. No bladder lesions. Left RPG with mild hydronephrosis. Left ureteral stent (6x26) placed without issue. No significant cloudy/purulent urine noted.      Unfortunately, patient had significant issues with stent and was admitted for 1 day for stent related pain.  This improved.  She did have residual nausea after which has been ongoing since her initial procedure.  We proceeded with surgery on 5/20/2025.    Findings:  Normal urethra. Stent not encrusted. Left ureteroscopy with ~6mm stone in mid/distal ureter; laser fragmented/basket extracted.   Kidney with additional scattered stones; largest ~8mm in size. Additional nephrocalcinosis noted. All stones laser fragmented/dusted and basket extracted.   6x26 stent placed without issue.      Surgery was uncomplicated as above.  Unfortunately, she returned to the emergency room on 5/22/2025 with ongoing nausea.  No pain.  Her workup was otherwise normal.  Her CT scan noted stent in appropriate position, no hydronephrosis, faint 2 mm stone in the kidney.  Picture below.  On my review, likely a tiny amount of stone dust.  Patient doing well today.  We discussed CT scan findings and options.  Will proceed with stent removal.  Her nausea is slowly  improving.               Stone analysis:    Pending.    PROCEDURE:       1. Flexible cystoscopy, removal of Left ureteral stent ([id number])    DATE OF PROCEDURE: 2025     PRE-PROCEDURE DIAGNOSIS: Nephrolithiasis    POST-PROCEDURE DIAGNOSIS: Same     SURGEON: Walker Andrade MD    FINDINGS:  Stent successfully removed in its entirety.     PROCEDURE:   Patient was brought to the procedure suite and a time-out was performed identifiying the patient,  and procedure to be performed. The risks and benefits of the procedure were once again discussed with the patient including bleeding, infection, and dysuria. The patient agreed to proceed. The patient did not have any signs or symptoms of active UTI. Prophylactic PO antibiotics were given in clinic.    The patient was placed in supine position on the table and the genital area was prepped and draped in the standard sterile fashion. Urojet was used prior for local anesthesia effect. A flexible cystoscope was inserted per urethra. There were no urethral strictures present. The bladder was entered and the distal coil of the stent was seen protruding from the ureteral orifice. The stent was grasped with the flexible stent grasper, and then the stent and cystoscope were both removed. The stent was visualized outside the body and confirmed to be intact and fully removed.     There were no complications and the patient tolerated the procedure well.    IMPRESSION:  Successful stent removal after ureteroscopy today.    PLAN:   -Renal/bladder ultrasound  in 8 weeks to ensure no silent hydronephrosis  -24 hour urine study and blood work (uric acid, pth, calcium) for metabolic workup    I discussed general fluid and dietary guidelines to help prevent further stone formation including:    - Fluid consumption of preferably water to make 2-2.5  L/day of urine  - Low sodium consumption  - Adequate calcium consumption (approximately 1200 mg/day)  - Low fat and moderate animal protein  consumption  - Limit consumption  of oxalate rich foods   - I encouraged increased dietary intake of citrate with lemon juice (4 oz day)       For patient's right-sided kidney stone burden, we discussed options including observation versus ureteroscopy.  She has several stones in the side, largest is about 8 mm in size.  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  We will plan for the above follow-up but if she changes her mind she will send me a message to schedule right-sided ureteroscopy.  All questions answered.  Warning signs to present to ED discussed.   Patient already stopped topimerate.     Return in 3/4 months       Walker Andrade MD  Staff Urologist  [facility]  Office: [phone]

## 2025-05-29 RX ORDER — ROPINIROLE 3 MG/1
6 TABLET, FILM COATED ORAL NIGHTLY
Qty: 180 TABLET | Refills: 0 | Status: SHIPPED | OUTPATIENT
Start: 2025-05-29

## 2025-05-31 LAB
CAOX DIHYDRATE: 10 %
CAOX MONOHYDRATE: 90 %
WEIGHT-STONE: 357 MG

## 2025-06-07 ENCOUNTER — PATIENT MESSAGE (OUTPATIENT)
Dept: SURGERY | Facility: CLINIC | Age: 40
End: 2025-06-07

## 2025-06-07 DIAGNOSIS — E03.9 ACQUIRED HYPOTHYROIDISM: Primary | ICD-10-CM

## 2025-06-09 ENCOUNTER — TELEPHONE (OUTPATIENT)
Dept: SURGERY | Facility: CLINIC | Age: 40
End: 2025-06-09

## 2025-06-09 DIAGNOSIS — N20.0 KIDNEY STONE: Primary | ICD-10-CM

## 2025-06-09 RX ORDER — LEVOTHYROXINE SODIUM 300 UG/1
TABLET ORAL
Qty: 90 TABLET | Refills: 0 | Status: SHIPPED | OUTPATIENT
Start: 2025-06-09

## 2025-06-09 RX ORDER — ROSUVASTATIN CALCIUM 5 MG/1
5 TABLET, COATED ORAL NIGHTLY
Qty: 90 TABLET | Refills: 0 | Status: SHIPPED | OUTPATIENT
Start: 2025-06-09

## 2025-06-09 RX ORDER — LEVOTHYROXINE SODIUM 25 UG/1
TABLET ORAL
Qty: 90 TABLET | Refills: 0 | Status: SHIPPED | OUTPATIENT
Start: 2025-06-09

## 2025-06-09 NOTE — TELEPHONE ENCOUNTER
A refill request was received for:  Requested Prescriptions     Pending Prescriptions Disp Refills    LEVOTHYROXINE SODIUM 300 MCG Oral Tab [Pharmacy Med Name: LEVOTHYROXINE 300 MCG TABLET] 90 tablet 0     Sig: TAKE 1 TABLET (300 MCG TOTAL) BY MOUTH DAILY. TAKE LEVOTHYROXINE 25MCG WITH THIS TO EQUAL 325MCG DAILY.    LEVOTHYROXINE 25 MCG Oral Tab [Pharmacy Med Name: LEVOTHYROXINE 25 MCG TABLET] 90 tablet 0     Sig: TAKE 1 TABLET (25 MCG TOTAL) BY MOUTH BEFORE BREAKFAST. TAKE LEVOTHYROXINE 300MCG WITH THIS TO EQUAL 325MCG DAILY.     Signed Prescriptions Disp Refills    ROSUVASTATIN 5 MG Oral Tab 90 tablet 0     Sig: TAKE 1 TABLET BY MOUTH EVERY DAY AT NIGHT     Authorizing Provider: TAD MAC     Ordering User: URBANO MENJIVAR       Last refill date:   5/14/25    Last office visit: 3/18/25    Follow up due:  Future Appointments   Date Time Provider Department Center   9/24/2025  4:00 PM Walker Andrade MD [id number]

## 2025-06-09 NOTE — TELEPHONE ENCOUNTER
Hi,    Can you please schedule this patient for surgery.   to have patient come in to see me or set up a telehealth at end of June to discuss more .   She should be scheduled in ~August.     Also, can you arrange for the patient to drop a urine sample for urine culture 1-2 weeks prior to the scheduled surgery date? I placed the order. ?    Thanks,  Walker        Urology Surgery Request  Surgeon: Walker Andrade  Location (if known): [facility]  Procedure: Cystoscopy, RIGHT ureteroscopy, laser lithotripsy, stent placement  Anesthesia: General   Time Frame: As above   Time required: 60 minutes  Diagnosis: Nephrolithiasis  Special Equipment: C-arm    Antibiotics: per hospital protocol unless checked below   ___ Levaquin 500 mg IV   ___ Gemcitabine 2 g/100 mL NS bladder instillation to be given in OR    Estimated Post Op/Follow Up Appt:   1 week for cystoscopy/stent removal in clinic with me. Please schedule appointment at time of surgery scheduling.

## 2025-06-12 ENCOUNTER — PATIENT MESSAGE (OUTPATIENT)
Dept: FAMILY MEDICINE CLINIC | Facility: CLINIC | Age: 40
End: 2025-06-12

## 2025-06-12 RX ORDER — BUPROPION HYDROCHLORIDE 150 MG/1
TABLET ORAL
Qty: 20 TABLET | Refills: 0 | Status: SHIPPED | OUTPATIENT
Start: 2025-06-12

## 2025-06-27 ENCOUNTER — HOSPITAL ENCOUNTER (OUTPATIENT)
Dept: MAMMOGRAPHY | Age: 40
Discharge: HOME | End: 2025-06-27
Attending: INTERNAL MEDICINE
Payer: COMMERCIAL

## 2025-06-27 DIAGNOSIS — Z12.31 ENCOUNTER FOR SCREENING MAMMOGRAM FOR BREAST CANCER: ICD-10-CM

## 2025-06-27 PROCEDURE — 77063 BREAST TOMOSYNTHESIS BI: CPT | Performed by: INTERNAL MEDICINE

## 2025-06-27 PROCEDURE — 77067 SCR MAMMO BI INCL CAD: CPT | Performed by: INTERNAL MEDICINE

## 2025-07-14 ENCOUNTER — LAB ENCOUNTER (OUTPATIENT)
Dept: LAB | Age: 40
End: 2025-07-14
Attending: UROLOGY
Payer: COMMERCIAL

## 2025-07-14 DIAGNOSIS — N20.0 KIDNEY STONE: ICD-10-CM

## 2025-07-14 PROCEDURE — 87086 URINE CULTURE/COLONY COUNT: CPT

## 2025-07-15 ENCOUNTER — APPOINTMENT (OUTPATIENT)
Dept: ADMINISTRATIVE | Facility: HOSPITAL | Age: 40
End: 2025-07-15
Payer: COMMERCIAL

## 2025-07-21 RX ORDER — BUPROPION HYDROCHLORIDE 150 MG/1
300 TABLET ORAL EVERY MORNING
Qty: 180 TABLET | Refills: 1 | Status: SHIPPED | OUTPATIENT
Start: 2025-07-21

## 2025-07-21 NOTE — TELEPHONE ENCOUNTER
.A refill request was received for:  Requested Prescriptions     Pending Prescriptions Disp Refills    BUPROPION  MG Oral Tablet 24 Hr [Pharmacy Med Name: BUPROPION HCL  MG TABLET] 180 tablet 1     Sig: TAKE 2 TABLETS BY MOUTH EVERY MORNING       Last refill date:   6/12/25    Last office visit: 4/17/25    Follow up due:  Future Appointments   Date Time Provider Department Center   8/4/2025 10:45 AM Walker Andrade MD [id number]   9/24/2025  4:00 PM Walker Andrade MD [id number]  [facility]

## 2025-07-27 ENCOUNTER — ANESTHESIA EVENT (OUTPATIENT)
Dept: SURGERY | Facility: HOSPITAL | Age: 40
End: 2025-07-27
Payer: COMMERCIAL

## 2025-07-27 NOTE — ANESTHESIA PREPROCEDURE EVALUATION
PRE-OP EVALUATION    Patient Name: Iliana Ryan    Admit Diagnosis: Nephrolithiasis [N20.0]    Pre-op Diagnosis: Nephrolithiasis [N20.0]    Cystoscopy, RIGHT ureteroscopy, Right laser lithotripsy, Right stent placement    Anesthesia Procedure: Cystoscopy, RIGHT ureteroscopy, Right laser lithotripsy, Right stent placement (Right)    Surgeons and Role:     * Walker Andrade MD - Primary    Pre-op vitals reviewed.  Temp: 98.7 °F (37.1 °C)  Pulse: 66  Resp: 16  BP: 125/84  SpO2: 100 %  Body mass index is 35.58 kg/m².    Current medications reviewed.  Hospital Medications:  Current Medications[1]    Outpatient Medications:   Prescriptions Prior to Admission[2]    Allergies: Patient has no known allergies.      Anesthesia Evaluation        Anesthetic Complications  (+) history of anesthetic complications (succesful Ione intubation x2 w/ rigid stylet)  History of: difficult airway and PONV       GI/Hepatic/Renal                (+) liver disease (HANNA)                 Cardiovascular  Comment: TTE 2023 for chest pain. Normal LVEF, no sig valve dz              (+) obesity  (+) hypertension                                     Endo/Other                                  Pulmonary                    (+) sleep apnea       Neuro/Psych      (+) depression                        Size 3 LMA better fit last time        Past Surgical History[3]  Social Hx on file[4]  History   Drug Use No     Available pre-op labs reviewed.  Lab Results   Component Value Date    WBC 10.6 05/22/2025    RBC 5.07 05/22/2025    HGB 11.3 (L) 05/22/2025    HCT 37.1 05/22/2025    MCV 73.2 (L) 05/22/2025    MCH 22.3 (L) 05/22/2025    MCHC 30.5 (L) 05/22/2025    RDW 15.0 05/22/2025    .0 (H) 05/22/2025     Lab Results   Component Value Date     (L) 05/22/2025    K 4.1 05/22/2025     05/22/2025    CO2 25.0 05/22/2025    BUN <5 (L) 05/22/2025    CREATSERUM 0.79 05/22/2025     (H) 05/22/2025    CA 9.7 05/22/2025             Airway      Mallampati: III  Mouth opening: 3 FB  TM distance: 4 - 6 cm  Neck ROM: full Cardiovascular      Rhythm: regular  Rate: normal     Dental    Dentition appears grossly intact         Pulmonary      Breath sounds clear to auscultation bilaterally.               Other findings              ASA: 3   Plan: general  NPO status verified and patient meets guidelines.    Post-procedure pain management plan discussed with surgeon and patient.      Plan/risks discussed with: patient (Risks of general anesthesia discussed with patient, including nausea/vomiting, sore throat, dental injury, aspiration, allergic reaction, and cardiopulmonary compromise. All of their questions were answered and they are in agreement with the plan.)                Present on Admission:  **None**             [1]    acetaminophen (Tylenol Extra Strength) tab 1,000 mg  1,000 mg Oral Once    scopolamine (Transderm-Scop) 1 MG/3DAYS patch 1 patch  1 patch Transdermal Once    lactated ringers infusion   Intravenous Continuous    ceFAZolin (Ancef) 2g in 10mL IV syringe premix  2 g Intravenous Once   [2]   Facility-Administered Medications Prior to Admission   Medication Dose Route Frequency Provider Last Rate Last Admin    [COMPLETED] sulfamethoxazole-trimethoprim DS (Bactrim DS) 800-160 MG per tab 1 tablet  1 tablet Oral Once Walker Andrade MD   1 tablet at 05/28/25 1410     Medications Prior to Admission   Medication Sig Dispense Refill Last Dose/Taking    BUPROPION  MG Oral Tablet 24 Hr TAKE 2 TABLETS BY MOUTH EVERY MORNING 180 tablet 1 7/28/2025 at  3:00 AM    cephALEXin 500 MG Oral Cap Take 1 capsule (500 mg total) by mouth 3 (three) times daily for 5 days. Start 2 days before surgery 15 capsule 0 7/28/2025 at  3:00 AM    ROSUVASTATIN 5 MG Oral Tab TAKE 1 TABLET BY MOUTH EVERY DAY AT NIGHT 90 tablet 0 7/27/2025 Evening    LEVOTHYROXINE SODIUM 300 MCG Oral Tab TAKE 1 TABLET (300 MCG TOTAL) BY MOUTH DAILY. TAKE LEVOTHYROXINE 25MCG  WITH THIS TO EQUAL 325MCG DAILY. 90 tablet 0 2025 at  3:00 AM    LEVOTHYROXINE 25 MCG Oral Tab TAKE 1 TABLET (25 MCG TOTAL) BY MOUTH BEFORE BREAKFAST. TAKE LEVOTHYROXINE 300MCG WITH THIS TO EQUAL 325MCG DAILY. 90 tablet 0 2025 at  3:00 AM    rOPINIRole HCl 3 MG Oral Tab Take 2 tablets (6 mg total) by mouth nightly. (Patient taking differently: Take 2 tablets (6 mg total) by mouth nightly.) 180 tablet 0 2025 Evening    diazePAM 5 MG Oral Tab Take 1 tablet (5 mg total) by mouth every 6 (six) hours as needed (spasms). 30 tablet 0 More than a month    phenazopyridine (PYRIDIUM) 100 MG Oral Tab Take 1 tablet (100 mg total) by mouth 3 (three) times daily as needed for Pain. This will turn your urine orange. 10 tablet 0 More than a month    Polyethylene Glycol 3350 (MIRALAX) 17 g Oral Powd Pack Take 17 g by mouth daily as needed (Take to avoid constipation, especially if taking narcotic pain medications.). 20 packet 1 More than a month    HYDROcodone-acetaminophen 5-325 MG Oral Tab Take 1-2 tablets by mouth every 4 (four) hours as needed for Pain. 20 tablet 0 More than a month   [3]   Past Surgical History:  Procedure Laterality Date          x2    Cholecystectomy  2020    Gastric bypass,obese<100cm shana-en-y      Hysterectomy      partial; ovaries intact    Laparoscopic salpingostomy Right     right-ectopic    Other surgical history  2005    thyroid removed    Removal gallbladder      Thyroidectomy  2005    nodular goiter    Total abdominal hysterectomy  2023    + ovaries, cervix removed   [4]   Social History  Socioeconomic History    Marital status:    Tobacco Use    Smoking status: Never    Smokeless tobacco: Never   Vaping Use    Vaping status: Never Used   Substance and Sexual Activity    Alcohol use: Yes     Comment: once a month    Drug use: No    Sexual activity: Yes     Partners: Male

## 2025-07-28 ENCOUNTER — APPOINTMENT (OUTPATIENT)
Dept: GENERAL RADIOLOGY | Facility: HOSPITAL | Age: 40
End: 2025-07-28
Attending: UROLOGY

## 2025-07-28 ENCOUNTER — HOSPITAL ENCOUNTER (OUTPATIENT)
Facility: HOSPITAL | Age: 40
Setting detail: HOSPITAL OUTPATIENT SURGERY
Discharge: HOME | End: 2025-07-28
Attending: UROLOGY | Admitting: UROLOGY

## 2025-07-28 ENCOUNTER — ANESTHESIA (OUTPATIENT)
Dept: SURGERY | Facility: HOSPITAL | Age: 40
End: 2025-07-28
Payer: COMMERCIAL

## 2025-07-28 VITALS
TEMPERATURE: 98 F | SYSTOLIC BLOOD PRESSURE: 148 MMHG | OXYGEN SATURATION: 97 % | HEIGHT: 68 IN | HEART RATE: 72 BPM | RESPIRATION RATE: 18 BRPM | BODY MASS INDEX: 35.46 KG/M2 | WEIGHT: 234 LBS | DIASTOLIC BLOOD PRESSURE: 92 MMHG

## 2025-07-28 DIAGNOSIS — N20.0 NEPHROLITHIASIS: ICD-10-CM

## 2025-07-28 PROCEDURE — 74420 UROGRAPHY RTRGR +-KUB: CPT | Performed by: UROLOGY

## 2025-07-28 PROCEDURE — 52356 CYSTO/URETERO W/LITHOTRIPSY: CPT | Performed by: UROLOGY

## 2025-07-28 DEVICE — IMPLANTABLE DEVICE: Type: IMPLANTABLE DEVICE | Site: URETER | Status: FUNCTIONAL

## 2025-07-28 RX ORDER — SODIUM CHLORIDE, SODIUM LACTATE, POTASSIUM CHLORIDE, CALCIUM CHLORIDE 600; 310; 30; 20 MG/100ML; MG/100ML; MG/100ML; MG/100ML
INJECTION, SOLUTION INTRAVENOUS CONTINUOUS
Status: DISCONTINUED | OUTPATIENT
Start: 2025-07-28 | End: 2025-07-28

## 2025-07-28 RX ORDER — LIDOCAINE HYDROCHLORIDE 20 MG/ML
JELLY TOPICAL AS NEEDED
Status: DISCONTINUED | OUTPATIENT
Start: 2025-07-28 | End: 2025-07-28 | Stop reason: HOSPADM

## 2025-07-28 RX ORDER — ONDANSETRON 2 MG/ML
INJECTION INTRAMUSCULAR; INTRAVENOUS AS NEEDED
Status: DISCONTINUED | OUTPATIENT
Start: 2025-07-28 | End: 2025-07-28 | Stop reason: SURG

## 2025-07-28 RX ORDER — ACETAMINOPHEN 500 MG
1000 TABLET ORAL ONCE AS NEEDED
Status: COMPLETED | OUTPATIENT
Start: 2025-07-28 | End: 2025-07-28

## 2025-07-28 RX ORDER — PROCHLORPERAZINE EDISYLATE 5 MG/ML
5 INJECTION INTRAMUSCULAR; INTRAVENOUS EVERY 8 HOURS PRN
Status: DISCONTINUED | OUTPATIENT
Start: 2025-07-28 | End: 2025-07-28

## 2025-07-28 RX ORDER — KETOROLAC TROMETHAMINE 30 MG/ML
INJECTION, SOLUTION INTRAMUSCULAR; INTRAVENOUS AS NEEDED
Status: DISCONTINUED | OUTPATIENT
Start: 2025-07-28 | End: 2025-07-28 | Stop reason: SURG

## 2025-07-28 RX ORDER — SCOPOLAMINE 1 MG/3D
1 PATCH, EXTENDED RELEASE TRANSDERMAL ONCE
Status: DISCONTINUED | OUTPATIENT
Start: 2025-07-28 | End: 2025-07-28 | Stop reason: HOSPADM

## 2025-07-28 RX ORDER — HYDROMORPHONE HYDROCHLORIDE 1 MG/ML
INJECTION, SOLUTION INTRAMUSCULAR; INTRAVENOUS; SUBCUTANEOUS
Status: COMPLETED
Start: 2025-07-28 | End: 2025-07-28

## 2025-07-28 RX ORDER — ONDANSETRON 2 MG/ML
4 INJECTION INTRAMUSCULAR; INTRAVENOUS EVERY 6 HOURS PRN
Status: DISCONTINUED | OUTPATIENT
Start: 2025-07-28 | End: 2025-07-28

## 2025-07-28 RX ORDER — LABETALOL HYDROCHLORIDE 5 MG/ML
INJECTION, SOLUTION INTRAVENOUS
Status: COMPLETED
Start: 2025-07-28 | End: 2025-07-28

## 2025-07-28 RX ORDER — HYDROCODONE BITARTRATE AND ACETAMINOPHEN 5; 325 MG/1; MG/1
2 TABLET ORAL ONCE AS NEEDED
Status: COMPLETED | OUTPATIENT
Start: 2025-07-28 | End: 2025-07-28

## 2025-07-28 RX ORDER — HYDROCODONE BITARTRATE AND ACETAMINOPHEN 5; 325 MG/1; MG/1
1 TABLET ORAL ONCE AS NEEDED
Status: COMPLETED | OUTPATIENT
Start: 2025-07-28 | End: 2025-07-28

## 2025-07-28 RX ORDER — HYDROMORPHONE HYDROCHLORIDE 1 MG/ML
0.2 INJECTION, SOLUTION INTRAMUSCULAR; INTRAVENOUS; SUBCUTANEOUS EVERY 5 MIN PRN
Status: DISCONTINUED | OUTPATIENT
Start: 2025-07-28 | End: 2025-07-28

## 2025-07-28 RX ORDER — ACETAMINOPHEN 500 MG
1000 TABLET ORAL ONCE
Status: DISCONTINUED | OUTPATIENT
Start: 2025-07-28 | End: 2025-07-28 | Stop reason: HOSPADM

## 2025-07-28 RX ORDER — OXYBUTYNIN CHLORIDE 5 MG/1
5 TABLET ORAL 3 TIMES DAILY PRN
Qty: 15 TABLET | Refills: 0 | Status: SHIPPED | OUTPATIENT
Start: 2025-07-28

## 2025-07-28 RX ORDER — HYDROCODONE BITARTRATE AND ACETAMINOPHEN 5; 325 MG/1; MG/1
TABLET ORAL
Status: COMPLETED
Start: 2025-07-28 | End: 2025-07-28

## 2025-07-28 RX ORDER — HYDROMORPHONE HYDROCHLORIDE 1 MG/ML
0.4 INJECTION, SOLUTION INTRAMUSCULAR; INTRAVENOUS; SUBCUTANEOUS EVERY 5 MIN PRN
Status: DISCONTINUED | OUTPATIENT
Start: 2025-07-28 | End: 2025-07-28

## 2025-07-28 RX ORDER — TRAMADOL HYDROCHLORIDE 50 MG/1
50 TABLET ORAL EVERY 6 HOURS PRN
Qty: 10 TABLET | Refills: 0 | Status: SHIPPED | OUTPATIENT
Start: 2025-07-28

## 2025-07-28 RX ORDER — DEXAMETHASONE SODIUM PHOSPHATE 4 MG/ML
VIAL (ML) INJECTION AS NEEDED
Status: DISCONTINUED | OUTPATIENT
Start: 2025-07-28 | End: 2025-07-28 | Stop reason: SURG

## 2025-07-28 RX ORDER — IOPAMIDOL 612 MG/ML
INJECTION, SOLUTION INTRAVASCULAR AS NEEDED
Status: DISCONTINUED | OUTPATIENT
Start: 2025-07-28 | End: 2025-07-28 | Stop reason: HOSPADM

## 2025-07-28 RX ORDER — POLYETHYLENE GLYCOL 3350 17 G/17G
17 POWDER, FOR SOLUTION ORAL DAILY PRN
Qty: 20 PACKET | Refills: 1 | Status: SHIPPED | OUTPATIENT
Start: 2025-07-28

## 2025-07-28 RX ORDER — NALOXONE HYDROCHLORIDE 0.4 MG/ML
0.08 INJECTION, SOLUTION INTRAMUSCULAR; INTRAVENOUS; SUBCUTANEOUS AS NEEDED
Status: DISCONTINUED | OUTPATIENT
Start: 2025-07-28 | End: 2025-07-28

## 2025-07-28 RX ORDER — LABETALOL HYDROCHLORIDE 5 MG/ML
5 INJECTION, SOLUTION INTRAVENOUS EVERY 5 MIN PRN
Status: DISCONTINUED | OUTPATIENT
Start: 2025-07-28 | End: 2025-07-28

## 2025-07-28 RX ORDER — TAMSULOSIN HYDROCHLORIDE 0.4 MG/1
0.4 CAPSULE ORAL EVERY EVENING
Qty: 14 CAPSULE | Refills: 0 | Status: SHIPPED | OUTPATIENT
Start: 2025-07-28 | End: 2025-08-11

## 2025-07-28 RX ORDER — HYDROMORPHONE HYDROCHLORIDE 1 MG/ML
0.6 INJECTION, SOLUTION INTRAMUSCULAR; INTRAVENOUS; SUBCUTANEOUS EVERY 5 MIN PRN
Status: DISCONTINUED | OUTPATIENT
Start: 2025-07-28 | End: 2025-07-28

## 2025-07-28 RX ORDER — PHENAZOPYRIDINE HYDROCHLORIDE 100 MG/1
100 TABLET, FILM COATED ORAL 3 TIMES DAILY PRN
Qty: 10 TABLET | Refills: 0 | Status: SHIPPED | OUTPATIENT
Start: 2025-07-28

## 2025-07-28 RX ORDER — LIDOCAINE HYDROCHLORIDE 10 MG/ML
INJECTION, SOLUTION EPIDURAL; INFILTRATION; INTRACAUDAL; PERINEURAL AS NEEDED
Status: DISCONTINUED | OUTPATIENT
Start: 2025-07-28 | End: 2025-07-28 | Stop reason: SURG

## 2025-07-28 RX ADMIN — DEXAMETHASONE SODIUM PHOSPHATE 4 MG: 4 MG/ML VIAL (ML) INJECTION at 07:17:00

## 2025-07-28 RX ADMIN — SODIUM CHLORIDE, SODIUM LACTATE, POTASSIUM CHLORIDE, CALCIUM CHLORIDE: 600; 310; 30; 20 INJECTION, SOLUTION INTRAVENOUS at 07:07:00

## 2025-07-28 RX ADMIN — KETOROLAC TROMETHAMINE 30 MG: 30 INJECTION, SOLUTION INTRAMUSCULAR; INTRAVENOUS at 08:03:00

## 2025-07-28 RX ADMIN — LIDOCAINE HYDROCHLORIDE 50 MG: 10 INJECTION, SOLUTION EPIDURAL; INFILTRATION; INTRACAUDAL; PERINEURAL at 07:13:00

## 2025-07-28 RX ADMIN — ONDANSETRON 4 MG: 2 INJECTION INTRAMUSCULAR; INTRAVENOUS at 07:55:00

## 2025-07-28 NOTE — ANESTHESIA PROCEDURE NOTES
Airway  Date/Time: 7/28/2025 7:13 AM  Reason: elective      General Information and Staff   Patient location during procedure: OR  Anesthesiologist: Lowell Rawls MD  Performed: anesthesiologist   Performed by: Lowell Rawls MD  Authorized by: Lowell Rawls MD        Indications and Patient Condition  Indications for airway management: anesthesia  Sedation level: deep      Preoxygenated: yesPatient position: sniffing    Mask difficulty assessment: 0 - not attempted    Final Airway Details    Final airway type: supraglottic airway      Successful airway: classic  Size: 3     Number of attempts at approach: 1    Additional Comments  LMA Classic 3 with easy placement and good seal

## 2025-07-28 NOTE — ANESTHESIA POSTPROCEDURE EVALUATION
[facility]    Iliana Ryan Patient Status:  Hospital Outpatient Surgery   Age/Gender 40 year old female MRN [id number]   Location [facility] POST ANESTHESIA CARE UNIT Attending Walker Andrade MD   Hosp Day # 0 PCP PHYSICIAN NONSTAFF       Anesthesia Post-op Note    Cystoscopy, RIGHT ureteroscopy, Right laser lithotripsy, Right stent placement    Procedure Summary       Date: 07/28/25 Room / Location:  MAIN OR [address] [place] [address] MAIN OR    Anesthesia Start: 0706 Anesthesia Stop: 0821    Procedure: Cystoscopy, RIGHT ureteroscopy, Right laser lithotripsy, Right stent placement (Right: Urethra) Diagnosis:       Nephrolithiasis      (Nephrolithiasis [N20.0])    Surgeons: Walker Andrade MD Anesthesiologist: Lowell Rawls MD    Anesthesia Type: general ASA Status: 3            Anesthesia Type: general    Vitals Value Taken Time   /96 07/28/25 08:21   Temp 98.5 °F (36.9 °C) 07/28/25 08:21   Pulse 71 07/28/25 08:21   Resp 18 07/28/25 08:21   SpO2 100 % 07/28/25 08:21   Vitals shown include unfiled device data.        Patient Location: PACU    Anesthesia Type: general    Airway Patency: patent    Postop Pain Control: adequate    Mental Status: mildly sedated but able to meaningfully participate in the post-anesthesia evaluation    Nausea/Vomiting: none    Cardiopulmonary/Hydration status: stable euvolemic    Complications: no apparent anesthesia related complications    Postop vital signs: stable    Dental Exam: Unchanged from Preop    Patient to be discharged from PACU when criteria met.

## 2025-08-04 ENCOUNTER — PROCEDURE (OUTPATIENT)
Dept: SURGERY | Facility: CLINIC | Age: 40
End: 2025-08-04

## 2025-08-04 DIAGNOSIS — N20.0 KIDNEY STONE: Primary | ICD-10-CM

## 2025-08-04 LAB
APPEARANCE: CLEAR
BILIRUBIN: NEGATIVE
GLUCOSE (URINE DIPSTICK): NEGATIVE MG/DL
KETONES (URINE DIPSTICK): NEGATIVE MG/DL
MULTISTIX LOT#: ABNORMAL NUMERIC
NITRITE, URINE: NEGATIVE
PH, URINE: 6 (ref 4.5–8)
PROTEIN (URINE DIPSTICK): 30 MG/DL
SPECIFIC GRAVITY: 1.02 (ref 1–1.03)
URINE-COLOR: YELLOW
UROBILINOGEN,SEMI-QN: 0.2 MG/DL (ref 0–1.9)

## 2025-08-04 PROCEDURE — 81003 URINALYSIS AUTO W/O SCOPE: CPT | Performed by: UROLOGY

## 2025-08-04 PROCEDURE — 52310 CYSTOSCOPY AND TREATMENT: CPT | Performed by: UROLOGY

## 2025-08-04 RX ORDER — SULFAMETHOXAZOLE AND TRIMETHOPRIM 800; 160 MG/1; MG/1
1 TABLET ORAL ONCE
Status: COMPLETED | OUTPATIENT
Start: 2025-08-04 | End: 2025-08-04

## 2025-08-04 RX ADMIN — SULFAMETHOXAZOLE AND TRIMETHOPRIM 1 TABLET: 800; 160 TABLET ORAL at 10:33:00

## 2025-08-07 LAB
CAOX DIHYDRATE: 10 %
CAOX MONOHYDRATE: 90 %
WEIGHT-STONE: 107 MG

## 2025-08-16 DIAGNOSIS — F34.1 DYSTHYMIC DISORDER: ICD-10-CM

## 2025-08-18 RX ORDER — BUPROPION HYDROCHLORIDE 150 MG/1
150 TABLET ORAL DAILY
Qty: 30 TABLET | Refills: 1 | OUTPATIENT
Start: 2025-08-18

## 2025-08-26 RX ORDER — ROPINIROLE 3 MG/1
6 TABLET, FILM COATED ORAL NIGHTLY
Qty: 180 TABLET | Refills: 0 | Status: SHIPPED | OUTPATIENT
Start: 2025-08-26

## (undated) DEVICE — Device

## (undated) DEVICE — 1200CC GUARDIAN II: Brand: GUARDIAN

## (undated) DEVICE — SINGLE ACTION PUMPING SYSTEM

## (undated) DEVICE — LIGHT HANDLE

## (undated) DEVICE — CATHETER URET 5FR L70CM FLX OPN TIP NONPORTED

## (undated) DEVICE — URETERAL ACCESS SHEATH SET: Brand: NAVIGATOR HD

## (undated) DEVICE — ADHESIVE LIQ 2/3ML VI MASTISOL

## (undated) DEVICE — DISPOSABLE LAPAROSCOPIC CLIP APPLIER WITH 20 CLIPS.: Brand: EPIX® UNIVERSAL CLIP APPLIER

## (undated) DEVICE — SYRINGE MED 20ML STD CLR PLAS LL TIP N CTRL

## (undated) DEVICE — GLOVE SUR 7.5 SENSICARE PI PIP CRM PWD F

## (undated) DEVICE — SLEEVE COMPR MD KNEE LEN SGL USE KENDALL SCD

## (undated) DEVICE — NITINOL STONE RETRIEVAL BASKET: Brand: ZERO TIP

## (undated) DEVICE — TROCAR: Brand: KII FIOS FIRST ENTRY

## (undated) DEVICE — LIGACLIP EXTRA LIGATING CLIP CARTRIDGES: 6 TITANIUM CLIPS/ CARTRIDGE (LARGE): Brand: LIGACLIP

## (undated) DEVICE — PACK PBDS CYSTOSCOPY

## (undated) DEVICE — ADAPTER BX SEAL Y BIOPSY PORT ADJ FOR HYSTEROSCOPE

## (undated) DEVICE — SYRINGE MED 10ML LL TIP W/O SFTY DISP

## (undated) DEVICE — SINGLE-USE DIGITAL FLEXIBLE URETEROSCOPE: Brand: LITHOVUE™ ELITE

## (undated) DEVICE — SUTURE VICRYL 0

## (undated) DEVICE — 20 ML SYRINGE LUER-LOCK TIP: Brand: MONOJECT

## (undated) DEVICE — FIBER LSR 200UM 2J 80HZ 60W DL FOR LITHO

## (undated) DEVICE — NITINOL WIRE WITH HYDROPHILIC TIP: Brand: SENSOR

## (undated) DEVICE — UNDYED BRAIDED (POLYGLACTIN 910), SYNTHETIC ABSORBABLE SUTURE: Brand: COATED VICRYL

## (undated) DEVICE — TISSUE RETRIEVAL SYSTEM: Brand: INZII RETRIEVAL SYSTEM

## (undated) DEVICE — DERMABOND LIQUID ADHESIVE

## (undated) DEVICE — LAP CHOLE/APPY CDS-LF: Brand: MEDLINE INDUSTRIES, INC.

## (undated) DEVICE — KENDALL SCD EXPRESS SLEEVES, KNEE LENGTH, MEDIUM: Brand: KENDALL SCD

## (undated) DEVICE — TROCAR: Brand: KII® SLEEVE

## (undated) DEVICE — SOLUTION IRRIG 3000ML 0.9% NACL FLX CONT

## (undated) DEVICE — RETRIEVAL DEPLOYMENT DEVICE: Brand: LITHOVUE EMPOWER™

## (undated) DEVICE — ENDOPATH ULTRA VERESS INSUFFLATION NEEDLES WITH LUER LOCK CONNECTORS: Brand: ENDOPATH

## (undated) DEVICE — STERILE SYNTHETIC POLYISOPRENE POWDER-FREE SURGICAL GLOVES WITH HYDROGEL COATING: Brand: PROTEXIS

## (undated) DEVICE — SOL  .9 1000ML BTL

## (undated) DEVICE — DEVICE RETRV DEPLOYMENT LITHOVUE EMPOWER

## (undated) DEVICE — DUAL LUMEN URETERAL CATHETER

## (undated) NOTE — LETTER
[facility] [address], [address]  Consent for Procedure/Sedation    Date:     Time:       1.  I authorize the performance upon Bella Roque the following:cardiac catheterization, left ventricular cineangiography, bilateral connor Signature of person authorized                                     Relationship to  to consent for patient: _________________________ patient: ___________________    Witness: _______________________________ Date: _____________________    Printed: 10/26/202

## (undated) NOTE — Clinical Note
Luis Paetl, pt feeling better since discharge.  Deferred scheduling visit with you she is following Urology  Thank you, Starr

## (undated) NOTE — LETTER
[facility] 3SW-A  [address] [place] [address]  Dept: [phone]         May 2, 2025    Patient: Iliana Ryan   YOB: 1985   Date of Visit: 5/1/2025       To Whom It May Concern:    Iliana Ryan was seen and treated in our Immediate Care department on 5/1/2025. She {Return to school/sport/work:76540}.    If you have any questions or concerns, please don't hesitate to call.      Encounter Provider(s):    Gilbert Pierson MD Dunne, Matthew, MD Akbari, Faisal, MD     12:19 PM  5/2/2025

## (undated) NOTE — ED AVS SNAPSHOT
Mrs. Casimiro Sellers   MRN: [id number]    Department:  [facility] Emergency Department in [place]   Date of Visit:  11/24/2018           Disclosure     Insurance plans vary and the physician(s) referred by the ER may not be covered by your plan.  Please tell this physician (or your personal doctor if your instructions are to return to your personal doctor) about any new or lasting problems. The primary care or specialist physician will see patients referred from the [facility] Emergency Department.  Mansi Bucio

## (undated) NOTE — ED AVS SNAPSHOT
[facility] Emergency Department    [facility] [address] [address]    Phone:  [phone]    Fax:  [phone]           Mrs. Sher Gaitan   MRN: [id number]    Department:  [facility] Emergency Department   Date of Visit: IF THERE IS ANY CHANGE OR WORSENING OF YOUR CONDITION, CALL YOUR PRIMARY CARE PHYSICIAN AT ONCE OR RETURN IMMEDIATELY TO THE EMERGENCY DEPARTMENT.     If you have been prescribed any medication(s), please fill your prescription right away and begin taking t

## (undated) NOTE — ED AVS SNAPSHOT
Mrs. Kayy Ibrahim   MRN: [id number]    Department:  [facility] in [place]   Date of Visit:  1/26/2020           Disclosure     Insurance plans vary and the physician(s) referred by the ER may not be covered by your plan.  Please tell this physician (or your personal doctor if your instructions are to return to your personal doctor) about any new or lasting problems. The primary care or specialist physician will see patients referred from the [facility] Emergency Department.  Yoav Adhikari

## (undated) NOTE — ED AVS SNAPSHOT
[facility] Emergency Department in [address]    Phone:  [phone]    Fax:  [phone]           Mrs. Bell Gilbert   MRN: [id number]    Department:  [facility] Emergency Department in [place]   Date of Insurance plans vary and the physician(s) referred by the ER may not be covered by your plan. Please contact your insurance company to determine coverage for follow-up care and referrals.     [facility]  Main [phone]  Pediatric If the emergency physician has read X-rays, these will be re-interpreted by a radiologist.  If there is a significant change in your reading, you will be contacted. Please make sure we have your correct phone number before you leave.  After you leave, you s and ask to get set up for an insurance coverage that is in-network with [facility].         Imaging Results         XR ABDOMEN, OBSTRUCTIVE SERIES (CPT=74020) (Final result) Result time:  03/11/17 13:50:11    Final result    Impression:    CONCL bile duct. Common bile duct diameter is 4 mm. PANCREAS:  Normal.  SPLEEN:  Normal.  KIDNEYS:  Small left-sided kidney stones are noted in measuring up to 4 mm in size in the midpole. Right kidney measures 12.5 cm. Left kidney measures 13.7 cm.   AORTA/I

## (undated) NOTE — LETTER
[address]  [address]  Authorization for Surgical Operation and Procedure     Date:___________                                                                                                         Time:__________  I hereby authorize * Surgery not found *, my physician and his/her assistants (if applicable), which may include medical students, residents, and/or fellows, to perform the following surgical operation/ procedure and administer such anesthesia as may be determined necessary by my physician:  Operation/Procedure name (s)  on Iliana Ryan   2.   I recognize that during the surgical operation/procedure, unforeseen conditions may necessitate additional or different procedures than those listed above.  I, therefore, further authorize and request that the above-named surgeon, assistants, or designees perform such procedures as are, in their judgment, necessary and desirable.    3.   My surgeon/physician has discussed prior to my surgery the potential benefits, risks and side effects of this procedure; the likelihood of achieving goals; and potential problems that might occur during recuperation.  They also discussed reasonable alternatives to the procedure, including risks, benefits, and side effects related to the alternatives and risks related to not receiving this procedure.  I have had all my questions answered and I acknowledge that no guarantee has been made as to the result that may be obtained.    4.   Should the need arise during my operation/procedure, which includes change of level of care prior to discharge, I also consent to the administration of blood and/or blood products.  Further, I understand that despite careful testing and screening of blood or blood products by collecting agencies, I may still be subject to ill effects as a result of receiving a blood transfusion and/or blood products.  The following are some, but not all, of the potential  risks that can occur: fever and allergic reactions, hemolytic reactions, transmission of diseases such as Hepatitis, AIDS and Cytomegalovirus (CMV) and fluid overload.  In the event that I wish to have an autologous transfusion of my own blood, or a directed donor transfusion, I will discuss this with my physician.  Check only if Refusing Blood or Blood Products  I understand refusal of blood or blood products as deemed necessary by my physician may have serious consequences to my condition to include possible death. I hereby assume responsibility for my refusal and release the hospital, its personnel, and my physicians from any responsibility for the consequences of my refusal.          o  Refuse      5.   I authorize the use of any specimen, organs, tissues, body parts or foreign objects that may be removed from my body during the operation/procedure for diagnosis, research or teaching purposes and their subsequent disposal by hospital authorities.  I also authorize the release of specimen test results and/or written reports to my treating physician on the hospital medical staff or other referring or consulting physicians involved in my care, at the discretion of the Pathologist or my treating physician.    6.   I consent to the photographing or videotaping of the operations or procedures to be performed, including appropriate portions of my body for medical, scientific, or educational purposes, provided my identity is not revealed by the pictures or by descriptive texts accompanying them.  If the procedure has been photographed/videotaped, the surgeon will obtain the original picture, image, videotape or CD.  The hospital will not be responsible for storage, release or maintenance of the picture, image, tape or CD.    7.   I consent to the presence of a  or observers in the operating room as deemed necessary by my physician or their designees.    8.   I recognize that in the event my procedure  results in extended X-Ray/fluoroscopy time, I may develop a skin reaction.    9. If I have a Do Not Attempt Resuscitation (DNAR) order in place, that status will be suspended while in the operating room, procedural suite, and during the recovery period unless otherwise explicitly stated by me (or a person authorized to consent on my behalf). The surgeon or my attending physician will determine when the applicable recovery period ends for purposes of reinstating the DNAR order.  10. Patients having a sterilization procedure: I understand that if the procedure is successful the results will be permanent and it will therefore be impossible for me to inseminate, conceive, or bear children.  I also understand that the procedure is intended to result in sterility, although the result has not been guaranteed.   11. I acknowledge that my physician has explained sedation/analgesia administration to me including the risk and benefits I consent to the administration of sedation/analgesia as may be necessary or desirable in the judgment of my physician.    I CERTIFY THAT I HAVE READ AND FULLY UNDERSTAND THE ABOVE CONSENT TO OPERATION and/or OTHER PROCEDURE.    _________________________________________  __________________________________  Signature of Patient     Signature of Responsible Person         ___________________________________         Printed Name of Responsible Person           _________________________________                 Relationship to Patient  _________________________________________  ______________________________  Signature of Witness          Date  Time      Patient Name: Iliana Ryan     : 1985                 Printed: May 13, 2025     Medical Record #: [id number]                     Page 1 [address]  [address]    Consent for Anesthesia    I, Iliana Ryan agree to be cared for by an  anesthesiologist, who is specially trained to monitor me and give me medicine to put me to sleep or keep me comfortable during my procedure    I understand that my anesthesiologist is not an employee or agent of [facility] or [employer]. He or she works for [employer].    As the patient asking for anesthesia services, I agree to:  Allow the anesthesiologist (anesthesia doctor) to give me medicine and do additional procedures as necessary. Some examples are: Starting or using an “IV” to give me medicine, fluids or blood during my procedure, and having a breathing tube placed to help me breathe when I’m asleep (intubation). In the event that my heart stops working properly, I understand that my anesthesiologist will make every effort to sustain my life, unless otherwise directed by [facility] Do Not Resuscitate documents.  Tell my anesthesia doctor before my procedure:  If I am pregnant.  The last time that I ate or drank.  All of the medicines I take (including prescriptions, herbal supplements, and pills I can buy without a prescription (including street drugs/illegal medications). Failure to inform my anesthesiologist about these medicines may increase my risk of anesthetic complications.  If I am allergic to anything or have had a reaction to anesthesia before.  I understand how the anesthesia medicine will help me (benefits).  I understand that with any type of anesthesia medicine there are risks:  The most common risks are: nausea, vomiting, sore throat, muscle soreness, damage to my eyes, mouth, or teeth (from breathing tube placement).  Rare risks include: remembering what happened during my procedure, allergic reactions to medications, injury to my airway, heart, lungs, vision, nerves, or muscles and in extremely rare instances death.  My doctor has explained to me other choices available to me for my care (alternatives).  Pregnant Patients (“epidural”):  I understand  that the risks of having an epidural (medicine given into my back to help control pain during labor), include itching, low blood pressure, difficulty urinating, headache or slowing of the baby’s heart. Very rare risks include infection, bleeding, seizure, irregular heart rhythms and nerve injury.  Regional Anesthesia (“spinal”, “epidural”, & “nerve blocks”):  I understand that rare but potential complications include headache, bleeding, infection, seizure, irregular heart rhythms, and nerve injury.    I can change my mind about having anesthesia services at any time before I get the medicine.    _____________________________________________________________________________  Patient (or Representative) Signature/Relationship to Patient  Date   Time    _____________________________________________________________________________   Name (if used)    Language/Organization   Time    _____________________________________________________________________________  Anesthesiologist Signature     Date   Time  I have discussed the procedure and information above with the patient (or patient’s representative) and answered their questions. The patient or their representative has agreed to have anesthesia services.    _____________________________________________________________________________  Witness        Date   Time  I have verified that the signature is that of the patient or patient’s representative, and that it was signed before the procedure  Patient Name: Iliana Ryan     : 1985                 Printed: May 13, 2025     Medical Record #: [id number]                     Page 2 of 2

## (undated) NOTE — ED AVS SNAPSHOT
[facility] Emergency Department    [facility] [address] [address]    Phone:  [phone]    Fax:  [phone]           Mrs. Opal Koch   MRN: [id number]    Department:  [facility] Emergency Department   Date of Visit: Bring a paper prescription for each of these medications    - cephALEXin 500 MG Caps              Discharge Instructions       Follow-up with your doctor in 1-2 days.   Return if increasing pain, nausea, vomiting take Motrin and Tylenol for pain return if return to your personal doctor) about any new or lasting problems. The primary care or specialist physician will see patients referred from the [facility] Emergency Department. Follow-up care is at the discretion of that Physician.     IF THERE IS ANY - If you have concerns related to behavioral health issues or thoughts of harming yourself, contact [address] [place] [address] at [phone].     - If you don’t have insurance, Jhonny Randolph has partnered with [facility] LUNGS:  Normal. No acute airspace disease. MEDIASTINUM/MAGGIE:  Normal. No lymphadenopathy. CARDIAC:  Normal. No pericardial effusion. PLEURA:  Normal. No pleural effusion.   CHEST WALL:  Normal.  LIVER:  Normal. No abnormal enhancement or mass lesion  KAREN

## (undated) NOTE — Clinical Note
May 2, 2025    Patient: Iliana Ryan   Date of Visit: 5/1/2025       To Whom It May Concern:    Iliana Ryan was seen and treated in our emergency department on 5/1/2025. She {Return to school/sport/work:[id number]}.    If you have any questions or concerns, please don't hesitate to call.       Encounter Provider(s):    Gilbert Pierson MD Dunne, Matthew, MD Akbari, Faisal, MD

## (undated) NOTE — MR AVS SNAPSHOT
[address], [address] [phone]               Thank you for choosing us for your health care visit with Yassine Shaikh MD.  We are glad to serve you and happy to provide you with [facility] visit,  view other health information, and more. To sign up or find more information, go to [email or web address] and click on the Sign Up Now link in the [employer] box.      Enter your [employer] Activation Code exactly as it appears below along with yo Don’t forget strength training with weights and resistance Set goals and track your progress   You don’t need to join a gym. Home exercises work great.  Put more priority on exercise in your life                    Visit [facility] online at

## (undated) NOTE — LETTER
[address]  [address]  Authorization for Surgical Operation and Procedure     Date:___________                                                                                                         Time:__________  I hereby authorize Surgeon(s):  Walker Andrade MD, my physician and his/her assistants (if applicable), which may include medical students, residents, and/or fellows, to perform the following surgical operation/ procedure and administer such anesthesia as may be determined necessary by my physician:  Operation/Procedure name (s) Procedure(s):  CYSTOSCOPY, LEFT RETROGRADE, INSERTION LEFT URETERAL STENT on Iliana Ryan   2.   I recognize that during the surgical operation/procedure, unforeseen conditions may necessitate additional or different procedures than those listed above.  I, therefore, further authorize and request that the above-named surgeon, assistants, or designees perform such procedures as are, in their judgment, necessary and desirable.    3.   My surgeon/physician has discussed prior to my surgery the potential benefits, risks and side effects of this procedure; the likelihood of achieving goals; and potential problems that might occur during recuperation.  They also discussed reasonable alternatives to the procedure, including risks, benefits, and side effects related to the alternatives and risks related to not receiving this procedure.  I have had all my questions answered and I acknowledge that no guarantee has been made as to the result that may be obtained.    4.   Should the need arise during my operation/procedure, which includes change of level of care prior to discharge, I also consent to the administration of blood and/or blood products.  Further, I understand that despite careful testing and screening of blood or blood products by collecting agencies, I may still be subject to ill effects as a result of receiving a blood  transfusion and/or blood products.  The following are some, but not all, of the potential risks that can occur: fever and allergic reactions, hemolytic reactions, transmission of diseases such as Hepatitis, AIDS and Cytomegalovirus (CMV) and fluid overload.  In the event that I wish to have an autologous transfusion of my own blood, or a directed donor transfusion, I will discuss this with my physician.  Check only if Refusing Blood or Blood Products  I understand refusal of blood or blood products as deemed necessary by my physician may have serious consequences to my condition to include possible death. I hereby assume responsibility for my refusal and release the hospital, its personnel, and my physicians from any responsibility for the consequences of my refusal.          o  Refuse      5.   I authorize the use of any specimen, organs, tissues, body parts or foreign objects that may be removed from my body during the operation/procedure for diagnosis, research or teaching purposes and their subsequent disposal by hospital authorities.  I also authorize the release of specimen test results and/or written reports to my treating physician on the hospital medical staff or other referring or consulting physicians involved in my care, at the discretion of the Pathologist or my treating physician.    6.   I consent to the photographing or videotaping of the operations or procedures to be performed, including appropriate portions of my body for medical, scientific, or educational purposes, provided my identity is not revealed by the pictures or by descriptive texts accompanying them.  If the procedure has been photographed/videotaped, the surgeon will obtain the original picture, image, videotape or CD.  The hospital will not be responsible for storage, release or maintenance of the picture, image, tape or CD.    7.   I consent to the presence of a  or observers in the operating room as deemed  necessary by my physician or their designees.    8.   I recognize that in the event my procedure results in extended X-Ray/fluoroscopy time, I may develop a skin reaction.    9. If I have a Do Not Attempt Resuscitation (DNAR) order in place, that status will be suspended while in the operating room, procedural suite, and during the recovery period unless otherwise explicitly stated by me (or a person authorized to consent on my behalf). The surgeon or my attending physician will determine when the applicable recovery period ends for purposes of reinstating the DNAR order.  10. Patients having a sterilization procedure: I understand that if the procedure is successful the results will be permanent and it will therefore be impossible for me to inseminate, conceive, or bear children.  I also understand that the procedure is intended to result in sterility, although the result has not been guaranteed.   11. I acknowledge that my physician has explained sedation/analgesia administration to me including the risk and benefits I consent to the administration of sedation/analgesia as may be necessary or desirable in the judgment of my physician.    I CERTIFY THAT I HAVE READ AND FULLY UNDERSTAND THE ABOVE CONSENT TO OPERATION and/or OTHER PROCEDURE.    _________________________________________  __________________________________  Signature of Patient     Signature of Responsible Person         ___________________________________         Printed Name of Responsible Person           _________________________________                 Relationship to Patient  _________________________________________  ______________________________  Signature of Witness          Date  Time      Patient Name: Iliana Ryan     : 1985                 Printed: May 1, 2025     Medical Record #: [id number]                     Page 1 of 2                                    [address]   [address]    Consent for Anesthesia    Iliana ORTEGA Denise Ryan agree to be cared for by an anesthesiologist, who is specially trained to monitor me and give me medicine to put me to sleep or keep me comfortable during my procedure    I understand that my anesthesiologist is not an employee or agent of [facility] or [employer]. He or she works for [employer].    As the patient asking for anesthesia services, I agree to:  Allow the anesthesiologist (anesthesia doctor) to give me medicine and do additional procedures as necessary. Some examples are: Starting or using an “IV” to give me medicine, fluids or blood during my procedure, and having a breathing tube placed to help me breathe when I’m asleep (intubation). In the event that my heart stops working properly, I understand that my anesthesiologist will make every effort to sustain my life, unless otherwise directed by [facility] Do Not Resuscitate documents.  Tell my anesthesia doctor before my procedure:  If I am pregnant.  The last time that I ate or drank.  All of the medicines I take (including prescriptions, herbal supplements, and pills I can buy without a prescription (including street drugs/illegal medications). Failure to inform my anesthesiologist about these medicines may increase my risk of anesthetic complications.  If I am allergic to anything or have had a reaction to anesthesia before.  I understand how the anesthesia medicine will help me (benefits).  I understand that with any type of anesthesia medicine there are risks:  The most common risks are: nausea, vomiting, sore throat, muscle soreness, damage to my eyes, mouth, or teeth (from breathing tube placement).  Rare risks include: remembering what happened during my procedure, allergic reactions to medications, injury to my airway, heart, lungs, vision, nerves, or muscles and in extremely rare instances death.  My doctor has explained to me other choices  available to me for my care (alternatives).  Pregnant Patients (“epidural”):  I understand that the risks of having an epidural (medicine given into my back to help control pain during labor), include itching, low blood pressure, difficulty urinating, headache or slowing of the baby’s heart. Very rare risks include infection, bleeding, seizure, irregular heart rhythms and nerve injury.  Regional Anesthesia (“spinal”, “epidural”, & “nerve blocks”):  I understand that rare but potential complications include headache, bleeding, infection, seizure, irregular heart rhythms, and nerve injury.    I can change my mind about having anesthesia services at any time before I get the medicine.    _____________________________________________________________________________  Patient (or Representative) Signature/Relationship to Patient  Date   Time    _____________________________________________________________________________   Name (if used)    Language/Organization   Time    _____________________________________________________________________________  Anesthesiologist Signature     Date   Time  I have discussed the procedure and information above with the patient (or patient’s representative) and answered their questions. The patient or their representative has agreed to have anesthesia services.    _____________________________________________________________________________  Witness        Date   Time  I have verified that the signature is that of the patient or patient’s representative, and that it was signed before the procedure  Patient Name: Iliana Ryan     : 1985                 Printed: May 1, 2025     Medical Record #: [id number]                     Page 2 of 2

## (undated) NOTE — ED AVS SNAPSHOT
[facility] in [address] [place] [address]    Phone:  [phone]    Fax:  [phone]           Mrs. Mervat Chapa   MRN: [id number]    Department:  [facility] in [place]   Date of IF THERE IS ANY CHANGE OR WORSENING OF YOUR CONDITION, CALL YOUR PRIMARY CARE PHYSICIAN AT ONCE OR RETURN IMMEDIATELY TO THE EMERGENCY DEPARTMENT.     If you have been prescribed any medication(s), please fill your prescription right away and begin taking t

## (undated) NOTE — LETTER
05/02/25    Iliana Ryan      To Whom It May Concern:    This letter has been written at the patient's request. The above patient was seen at [facility] for treatment of a medical condition from 5/1/25  - 5/2/25   - /  Th/e patient may return to work/school.      Sincerely,        Demian Barth MD  05/02/25, 1:55 PM

## (undated) NOTE — ED AVS SNAPSHOT
Mrs. Matthew Crawley   MRN: [id number]    Department:  [facility] Emergency Department   Date of Visit:  10/2/2018           Disclosure     Insurance plans vary and the physician(s) referred by the ER may not be covered by your plan.  Please conta tell this physician (or your personal doctor if your instructions are to return to your personal doctor) about any new or lasting problems. The primary care or specialist physician will see patients referred from the [facility] Emergency Department.  Enrrique Ordaz

## (undated) NOTE — LETTER
Date: 5/28/2025    Patient Name: Iliana Ryan          To Whom it may concern:    The above patient was seen at [facility] for treatment of a medical condition.    This patient should be excused from attending work on 05/28/2025.     This patient may return to work on 05/29/2025.        Sincerely,    Walker Andrade MD